# Patient Record
Sex: FEMALE | Race: WHITE | NOT HISPANIC OR LATINO | Employment: OTHER | URBAN - METROPOLITAN AREA
[De-identification: names, ages, dates, MRNs, and addresses within clinical notes are randomized per-mention and may not be internally consistent; named-entity substitution may affect disease eponyms.]

---

## 2017-02-02 ENCOUNTER — OFFICE VISIT (OUTPATIENT)
Dept: INTERNAL MEDICINE | Facility: IMAGING CENTER | Age: 72
End: 2017-02-02
Payer: MEDICARE

## 2017-02-02 VITALS
WEIGHT: 136 LBS | RESPIRATION RATE: 14 BRPM | OXYGEN SATURATION: 100 % | HEIGHT: 65 IN | BODY MASS INDEX: 22.66 KG/M2 | HEART RATE: 79 BPM | DIASTOLIC BLOOD PRESSURE: 60 MMHG | SYSTOLIC BLOOD PRESSURE: 130 MMHG | TEMPERATURE: 98.6 F

## 2017-02-02 DIAGNOSIS — R09.82 PND (POST-NASAL DRIP): ICD-10-CM

## 2017-02-02 DIAGNOSIS — I10 ESSENTIAL HYPERTENSION: ICD-10-CM

## 2017-02-02 DIAGNOSIS — M85.80 OSTEOPENIA: ICD-10-CM

## 2017-02-02 DIAGNOSIS — K58.9 IRRITABLE BOWEL SYNDROME, UNSPECIFIED TYPE: ICD-10-CM

## 2017-02-02 DIAGNOSIS — M50.30 DDD (DEGENERATIVE DISC DISEASE), CERVICAL: ICD-10-CM

## 2017-02-02 DIAGNOSIS — Z00.00 MEDICARE ANNUAL WELLNESS VISIT, SUBSEQUENT: ICD-10-CM

## 2017-02-02 DIAGNOSIS — K27.9 PUD (PEPTIC ULCER DISEASE): ICD-10-CM

## 2017-02-02 DIAGNOSIS — M51.36 DDD (DEGENERATIVE DISC DISEASE), LUMBAR: ICD-10-CM

## 2017-02-02 DIAGNOSIS — I47.10 SVT (SUPRAVENTRICULAR TACHYCARDIA): ICD-10-CM

## 2017-02-02 DIAGNOSIS — L71.9 ROSACEA: ICD-10-CM

## 2017-02-02 DIAGNOSIS — N02.9 BENIGN HEMATURIA: ICD-10-CM

## 2017-02-02 LAB
APPEARANCE UR: CLEAR
BILIRUB UR STRIP-MCNC: NEGATIVE MG/DL
COLOR UR AUTO: YELLOW
GLUCOSE UR STRIP.AUTO-MCNC: NEGATIVE MG/DL
KETONES UR STRIP.AUTO-MCNC: NEGATIVE MG/DL
LEUKOCYTE ESTERASE UR QL STRIP.AUTO: NEGATIVE
NITRITE UR QL STRIP.AUTO: NEGATIVE
PH UR STRIP.AUTO: 6 [PH] (ref 5–8)
PROT UR QL STRIP: NEGATIVE MG/DL
RBC UR QL AUTO: NORMAL
SP GR UR STRIP.AUTO: 1
UROBILINOGEN UR STRIP-MCNC: NEGATIVE MG/DL

## 2017-02-02 PROCEDURE — G0439 PPPS, SUBSEQ VISIT: HCPCS | Performed by: FAMILY MEDICINE

## 2017-02-02 PROCEDURE — 81002 URINALYSIS NONAUTO W/O SCOPE: CPT | Performed by: FAMILY MEDICINE

## 2017-02-02 PROCEDURE — 1036F TOBACCO NON-USER: CPT | Performed by: FAMILY MEDICINE

## 2017-02-02 PROCEDURE — G8432 DEP SCR NOT DOC, RNG: HCPCS | Performed by: FAMILY MEDICINE

## 2017-02-02 RX ORDER — AZELASTINE 1 MG/ML
1 SPRAY, METERED NASAL 2 TIMES DAILY
Qty: 30 ML | Refills: 3 | Status: SHIPPED | OUTPATIENT
Start: 2017-02-02 | End: 2017-12-07

## 2017-02-02 RX ORDER — INDAPAMIDE 1.25 MG
TABLET ORAL
Refills: 6 | COMMUNITY
Start: 2016-11-16

## 2017-02-02 RX ORDER — TRAVOPROST 0.04 MG/ML
SOLUTION/ DROPS OPHTHALMIC
Refills: 3 | COMMUNITY
Start: 2016-12-15

## 2017-02-02 ASSESSMENT — PATIENT HEALTH QUESTIONNAIRE - PHQ9: CLINICAL INTERPRETATION OF PHQ2 SCORE: 0

## 2017-02-02 NOTE — PROGRESS NOTES
Chief Complaint   Patient presents with   • Annual Wellness Visit         HPI:  Heriberto ESQUIVEL is a 71 y.o. established female patient here for Medicare Annual Wellness Visit . She has generally been doing well. This past year she did have a hospital admission for SVT which was treated with a completion. She denies any chest pain, palpitations since then.    She continues to exercise regularly.    Her complaint today is some postnasal drainage. This has been chronic. It is annoying during the day. Does not bother her at night. She is on Flonase. She has tried an antihistamine.      Patient Active Problem List    Diagnosis Date Noted   • SVT (supraventricular tachycardia) (CMS-[facility]) 02/09/2015     Priority: High   • Cervical stenosis of spinal canal 08/08/2016   • DDD (degenerative disc disease), cervical 07/01/2015   • DDD (degenerative disc disease), lumbar 05/04/2015   • Chest pain 02/07/2015   • History of PUD (peptic ulcer disease) 02/07/2015   • Epigastric pain 07/15/2014   • Benign hematuria 09/11/2013   • Hypertension 06/18/2013   • PAC (premature atrial contraction)    • Allergic state    • Rosacea    • IBS (irritable bowel syndrome)    • Osteopenia    • Hiatal hernia        Current Outpatient Prescriptions   Medication Sig Dispense Refill   • TRAVATAN Z 0.004 % Solution INSTILL 1 GTT IN OU HS  3   • azelastine (ASTELIN) 137 MCG/SPRAY nasal spray [place] 1 Spray in nose 2 times a day. 30 mL 3   • AVENOVA 0.01 % Solution APPLY 1ML TO EYELIDS DAILY  3   • metronidazole (METROLOTION) 0.75 % Lotion 1 APPLICATION APPLY ON THE SKIN TWICE A DAY  6   • metoprolol (LOPRESSOR) 25 MG Tab Take 1 Tab by mouth 2 Times a Day. 60 Tab 0   • LUTEIN PO Take 1 Tab by mouth every day.     • Calcium Carbonate-Vitamin D (CALCIUM + D PO) Take 1 Tab by mouth every day.     • multivitamin (THERAGRAN) Tab Take 1 Tab by mouth every day.     • VITAMIN E PO Take 1 Cap by mouth every day.     • Cyanocobalamin (VITAMIN B-12 PO) Take 1 Tab by mouth  every day.     • fluticasone (FLONASE) 50 MCG/ACT nasal spray USE 2 SPRAYS IN EACH NOSTRIL DAILY 16 Bottle 2   • estradiol (ESTRACE) 0.5 MG tablet TAKE 1 TABLET DAILY 90 Tab 3   • losartan (COZAAR) 50 MG Tab TAKE 1 TAB BY MOUTH EVERY DAY. 90 Tab 3   • Omega-3 Fatty Acids (FISH OIL) 1000 MG CAPS capsule Take 1,000 mg by mouth every day.     • FINACEA 15 % Gel 1 APPLICATION APPLY ON THE SKIN TWICE A DAY  6   • omeprazole (PRILOSEC) 20 MG delayed-release capsule TAKE 1 CAPSULE BY MOUTH EVERY DAY. 90 Cap 3   • metoprolol (LOPRESSOR) 25 MG Tab Take 1 Tab by mouth 2 times a day. 180 Tab 1   • PATADAY 0.2 % Solution INSTILL 1 DROP IN BOTH EYES DAILY  4   • travoprost (TRAVATAN Z) 0.004 % Solution Place 1 Drop in both eyes every evening.       No current facility-administered medications for this visit.        The patient reports adherence to this regimen   Current supplements as per medication list.   Chronic narcotic pain medicines: no     Allergies: Asa and Shellfish allergy    Current social contact/activities: Activity family, friends, travel  Exercise: Regularly  Is patient current with immunizations?  yes       She  reports that she has never smoked. She has never used smokeless tobacco. She reports that she drinks alcohol. She reports that she does not use illicit drugs.  Counseling given: Not Answered        DPA/Advanced directive: .has an advanced directive - a copy HAS NOT been provided. Encouraged her to bring a copy in.    ROS:    Gait: Uses no assistive device   Ostomy: no   Other tubes: no   Amputations: no   Chronic oxygen use no   Last eye exam: Within the past year, Dr. Loco  : Denies incontinence.       Screening:    Depression Screening    Little interest or pleasure in doing things?  0 - not at all  Feeling down, depressed , or hopeless? 0 - not at all      If depressive symptoms identified deferred to follow up visit unless specifically addressed in assesment and plan.      Screening for Cognitive  Impairment    Three Minute Recall (banana, sunrise, fence)  3/3    Draw clock face with all 12 numbers set to the hand to show 10 minures past 11 o'clock  1    Cognitive concerns identified defferred for follow up unless specifically addressed in assesment and plan.    Fall Risk Assessment    Has the patient had two or more falls in the last year or any fall with injury in the last year?  No    Safety Assessment    Throw rugs on floor.  Yes  Handrails on all stairs.  Yes  Good lighting in all hallways.  Yes  Difficulty hearing.  No  Patient counseled about all safety risks that were identified.    Functional Assessment ADLs    Are there any barriers preventing you from cooking for yourself or meeting nutritional needs?  No.    Are there any barriers preventing you from driving safely or obtaining transportation?  No.    Are there any barriers preventing you from using a telephone or calling for help?  No.    Are there any barriers preventing you from shopping?  No.    Are there any barriers preventing you from taking care of your own finances?  No.    Are there any barriers preventing you from managing your medications?  No.    Are currently engaing any exercise or physical activity?  Yes.           Patient Care Team:  Alona Pino M.D. as PCP - General (Family Medicine)  Soco Arzola R.N. as Registered Nurse   Cardiology: [facility]  PT: [facility]  Physical Med:   Dr. Knight  GI:  GI  OPT:  Dr. Loco  Derm:  Dr. Crump    Social History   Substance Use Topics   • Smoking status: Never Smoker    • Smokeless tobacco: Never Used   • Alcohol Use: Yes      Comment: rare     Family History   Problem Relation Age of Onset   • Cancer Mother      uterine   • Hypertension Mother    • Cancer Father    • Cancer Maternal Aunt      breast     She  has a past medical history of PAC (premature atrial contraction); Rosacea; Osteopenia; Allergy; IBS (irritable bowel syndrome); Hiatal hernia; Ulcer (CMS-HCC)  "(2004); Glaucoma; SVT (supraventricular tachycardia) (CMS-HCC) (2/9/2015); and DDD (degenerative disc disease), lumbar (5/4/2015).   Past Surgical History   Procedure Laterality Date   • Bunionectomy       x2   • Colonoscopy  2003     recheck 10 years   • Abdominal hysterectomy total  1987     REVIEW OF SYSTEMS:  GENERAL: No fatigue, no weight loss.  HEENT:  Ears--no earache, no change in hearing, no dizziness, no tinnitus.                 Eyes--no blurred vision, no discharge or pain                 Throat--No sore throat, no dysphagia, no hoarseness. Postnasal drainage as above.   CV:  No chest pain,dyspnea,palpitations or edema.  RESP:  No sob,cough,wheezing or hemoptysis.  GI: No dysphagia, heartburn,abdominal pain, nausea, vomiting, diarrhea or constipation.       No melena, jaundice, bleeding, incontinence or change in bowel habits.  :  No dysuria, polyuria, hematuria, incontinence, or nocturia.  MS: Occasional neck and low back pain. She is doing her home exercises regularly. She sees physical therapy once a month   NEURO: No seizures, syncope, paralysis, tremor, or weakness.  SKIN: No new or concerning skin lesions or changes.   PSYCH: Mood fine.        Exam:     Blood pressure 130/60, pulse 79, temperature 37 °C (98.6 °F), resp. rate 14, height 1.651 m (5' 5\"), weight 61.689 kg (136 lb), SpO2 100 %. Body mass index is 22.63 kg/(m^2).    Hearing good.    Dentition good  Alert, oriented in no acute distress.  Eye contact is good, speech goal directed, affect calm  PHYSICAL EXAM;  GENERAL;  WN/WD, No acute distress.   HEENT:  Head normocephalic and atraumatic.    PERRLA, EOMI. No conjunctival injection, no icterus.     TM's normal, nasal mucosa and mouth with no abnormalities.    Oropharynx is clear with no lesions.  NECK: Supple, no adenopathy or thyromegaly.  CV: RR. Normal S1,S2. No murmur, gallop or rub.          No JVD, Carotid pulses 2+ and sym. No bruits.  LUNGS:  Clear, no wheezes, rales or " rhonchi.  BREASTS: Symmetric, no masses or tenderness. No nipple discharge.  AXILLA:  No masses or tenderness.  ABDOMEN: Soft, NT, nondistended. Bowel sounds normal. No hepatosplenomegaly or masses. No rebound or guarding. No hernias.  : Normal external genitalia. Urethra unremarkable. Vaginal vault is normal. Cervix and uterus are absent. No adnexal masses or tenderness.  EXTREMITIES:  No edema.   NEURO:  CN II-XII intact. Motor and sensation grossly intact.  SKIN: No rashes or abnormal lesions.  Psych: Mood and affect are appropriate.          Assessment and Plan. The following treatment and monitoring plan is recommended:    1. Benign hematuria  POCT Urinalysis   2. SVT (supraventricular tachycardia) (CMS-HCC)     3. Rosacea     4. Irritable bowel syndrome, unspecified type     5. Osteopenia     6. Essential hypertension     7. History of PUD (peptic ulcer disease)     8. DDD (degenerative disc disease), lumbar     9. DDD (degenerative disc disease), cervical     10. PND (post-nasal drip)     11. Medicare annual wellness visit, subsequent     12. Healthcare Maintenance. Counseled re: nutrition, activity and safety. Reviewed immunizations.         Services needed: No services needed at this time  Health Care Screening recommendations as per orders if indicated.  Referrals offered: PT/OT/Nutrition counseling/Behavioral Health/Smoking cessation as per orders if indicated.    Discussion today about general wellness and lifestyle habits:    · Prevent falls and reduce trip hazards; Cautioned about securing or removing rugs.  · Have a working fire alarm and carbon monoxide detector;   · Engage in regular physical activity and social activities   ·       Follow-up: 6 months

## 2017-02-02 NOTE — MR AVS SNAPSHOT
"        Heriberto Nichols   2017 2:00 PM   Office Visit   MRN: [id number]    Department:  [facility]   Dept Phone:  [phone]    Description:  Female : 1945   Provider:  Alona Pino M.D.           Allergies as of 2017     Allergen Noted Reactions    Asa [Aspirin] 2010   Vomiting    Shellfish Allergy 2009   Vomiting    diarrhea      You were diagnosed with     Benign hematuria   [[id number]]         Vital Signs     Blood Pressure Pulse Temperature Respirations Height Weight    130/60 mmHg 79 37 °C (98.6 °F) 14 1.651 m (5' 5\") 61.689 kg (136 lb)    Body Mass Index Oxygen Saturation Smoking Status             22.63 kg/m2 100% Never Smoker          Basic Information     Date Of Birth Sex Race Ethnicity Preferred Language    1945 Female White Non- English      Problem List              ICD-10-CM Priority Class Noted - Resolved    PAC (premature atrial contraction) I49.1   Unknown - Present    Allergic state T78.40XA   Unknown - Present    Rosacea L71.9   Unknown - Present    IBS (irritable bowel syndrome) K58.9   Unknown - Present    Osteopenia M85.80   Unknown - Present    Hiatal hernia K44.9   Unknown - Present    Ulcer (CMS-HCC) L98.499   Unknown - Present    Hypertension I10   2013 - Present    Benign hematuria N02.9   2013 - Present    Epigastric pain R10.13   7/15/2014 - Present    Chest pain R07.9   2015 - Present    History of PUD (peptic ulcer disease) K27.9   2015 - Present    SVT (supraventricular tachycardia) (CMS-[facility]) I47.1 High  2015 - Present    DDD (degenerative disc disease), lumbar M51.36   2015 - Present    DDD (degenerative disc disease), cervical M50.30   2015 - Present    Cervical stenosis of spinal canal M48.02   2016 - Present      Health Maintenance        Date Due Completion Dates    PAP SMEAR 2013    IMM PNEUMOCOCCAL 65+ (ADULT) LOW/MEDIUM RISK SERIES (2 of 2 - PPSV23) 2015    MAMMOGRAM " 10/11/2017 10/11/2016, 10/9/2015    BONE DENSITY 10/7/2019 10/7/2014 (Done)    Override on 10/7/2014: Done    COLONOSCOPY 5/21/2023 5/21/2013, 5/21/2013 (Done)    Override on 5/21/2013: Done ([facility])    IMM DTaP/Tdap/Td Vaccine (2 - Td) 6/17/2023 6/17/2013            Results     POCT Urinalysis      Component Value Standard Range & Units    POC Color yellow Negative    POC Appearance clear Negative    POC Leukocyte Esterase negative Negative    POC Nitrites negative Negative    POC Urobiligen negative Negative (0.2) mg/dL    POC Protein negative Negative mg/dL    POC Urine PH 6.0 5.0 - 8.0    POC Blood moderate, non-hemolyzed Negative    POC Specific Gravity 1.005 <1.005 - >1.030    POC Ketones negative Negative mg/dL    POC Biliruben negative Negative mg/dL    POC Glucose negative Negative mg/dL                        Current Immunizations     13-VALENT PCV PREVNAR 8/1/2014    Hepatitis A Vaccine, Ped/Adol 8/9/1999    Influenza TIV (IM) 9/22/2014    Influenza Vaccine Adult HD 10/11/2016, 10/3/2015    Pneumococcal Vaccine (UF)Historical Data 10/1/2008    SHINGLES VACCINE 6/18/2007    Tdap Vaccine 6/17/2013  9:55 AM    Tetanus Vaccine 6/1/2001      Below and/or attached are the medications your provider expects you to take. Review all of your home medications and newly ordered medications with your provider and/or pharmacist. Follow medication instructions as directed by your provider and/or pharmacist. Please keep your medication list with you and share with your provider. Update the information when medications are discontinued, doses are changed, or new medications (including over-the-counter products) are added; and carry medication information at all times in the event of emergency situations     Allergies:  ASA - Vomiting     SHELLFISH ALLERGY - Vomiting               Medications  Valid as of: February 02, 2017 -  3:35 PM    Generic Name Brand Name Tablet Size Instructions for use    Azelaic Acid (Gel) FINACEA 15  % 1 APPLICATION APPLY ON THE SKIN TWICE A DAY        Azelastine HCl (Solution) ASTELIN 137 MCG/SPRAY Spray 1 Spray in nose 2 times a day.        Calcium Citrate-Vitamin D   Take 1 Tab by mouth every day.        Cyanocobalamin   Take 1 Tab by mouth every day.        Estradiol (Tab) ESTRACE 0.5 MG TAKE 1 TABLET DAILY        Eyelid Cleansers (Solution) AVENOVA 0.01 % APPLY 1ML TO EYELIDS DAILY        Fluticasone Propionate (Suspension) FLONASE 50 MCG/ACT USE 2 SPRAYS IN EACH NOSTRIL DAILY        Losartan Potassium (Tab) COZAAR 50 MG TAKE 1 TAB BY MOUTH EVERY DAY.        Lutein   Take 1 Tab by mouth every day.        Metoprolol Tartrate (Tab) LOPRESSOR 25 MG Take 1 Tab by mouth 2 Times a Day.        Metoprolol Tartrate (Tab) LOPRESSOR 25 MG Take 1 Tab by mouth 2 times a day.        MetroNIDAZOLE (Lotion) METROLOTION 0.75 % 1 APPLICATION APPLY ON THE SKIN TWICE A DAY        Multiple Vitamin (Tab) THERAGRAN  Take 1 Tab by mouth every day.        Olopatadine HCl (Solution) PATADAY 0.2 % INSTILL 1 DROP IN BOTH EYES DAILY        Omega-3 Fatty Acids (Cap) fish oil 1000 MG Take 1,000 mg by mouth every day.        Omeprazole (CAPSULE DELAYED RELEASE) PRILOSEC 20 MG TAKE 1 CAPSULE BY MOUTH EVERY DAY.        Travoprost (Solution) TRAVATAN Z 0.004 % Place 1 Drop in both eyes every evening.        Travoprost (Solution) TRAVATAN Z 0.004 % INSTILL 1 GTT IN OU HS        Vitamin E   Take 1 Cap by mouth every day.        .                 Medicines prescribed today were sent to:     [facility] [address] [place] [address] [facility]    [address] [facility] [address]    Phone: [phone] Fax: [phone]    Open 24 Hours?: No      Medication refill instructions:       If your prescription bottle indicates you have medication refills left, it is not necessary to call your provider’s office. Please contact your pharmacy and they will refill your medication.    If your prescription  bottle indicates you do not have any refills left, you may request refills at any time through one of the following ways: The online [employer] system (except Urgent Care), by calling your provider’s office, or by asking your pharmacy to contact your provider’s office with a refill request. Medication refills are processed only during regular business hours and may not be available until the next business day. Your provider may request additional information or to have a follow-up visit with you prior to refilling your medication.   *Please Note: Medication refills are assigned a new Rx number when refilled electronically. Your pharmacy may indicate that no refills were authorized even though a new prescription for the same medication is available at the pharmacy. Please request the medicine by name with the pharmacy before contacting your provider for a refill.        Other Notes About Your Plan     GI:   [facility]  Optometry:  Dr. Loco  Physical Med: Dr. Knight  PT:  [facility] Access Code: Activation code not generated  Current [employer] Status: Active

## 2017-02-09 PROBLEM — R31.1 BENIGN MICROSCOPIC HEMATURIA: Status: ACTIVE | Noted: 2017-02-09

## 2017-02-22 ENCOUNTER — PATIENT MESSAGE (OUTPATIENT)
Dept: INTERNAL MEDICINE | Facility: IMAGING CENTER | Age: 72
End: 2017-02-22

## 2017-02-22 DIAGNOSIS — I10 ESSENTIAL HYPERTENSION: ICD-10-CM

## 2017-02-22 RX ORDER — LOSARTAN POTASSIUM 50 MG/1
50 TABLET ORAL
Qty: 90 TAB | Refills: 3 | Status: SHIPPED | OUTPATIENT
Start: 2017-02-22 | End: 2018-01-14 | Stop reason: SDUPTHER

## 2017-02-22 NOTE — TELEPHONE ENCOUNTER
From: Heriberto Nichols  To: Alona Pino M.D.  Sent: 2/22/2017 10:17 AM PST  Subject: Prescription Question    Good morning Alona. It's time for me to refill Losartan Potassium 50 mg - 90 tablets. I have no refills and it's a hassle to transfer a RX from [facility] to [facility]. Will you send a new prescription to [facility] at [facility] and . [place].   Thanks, Jaun

## 2017-03-17 ENCOUNTER — PATIENT MESSAGE (OUTPATIENT)
Dept: INTERNAL MEDICINE | Facility: IMAGING CENTER | Age: 72
End: 2017-03-17

## 2017-03-17 NOTE — TELEPHONE ENCOUNTER
From: Heriberto Nichols  To: Alona Pino M.D.  Sent: 3/17/2017 10:05 AM PDT  Subject: Prescription Question    Happy [facility] Day, Alona. Time to refill my prescription for metoprolol 25 mg. Please send a new Rx to [place] for me. Thanks. Jaun

## 2017-04-12 ENCOUNTER — PATIENT MESSAGE (OUTPATIENT)
Dept: INTERNAL MEDICINE | Facility: IMAGING CENTER | Age: 72
End: 2017-04-12

## 2017-04-12 DIAGNOSIS — K58.9 IRRITABLE BOWEL SYNDROME, UNSPECIFIED TYPE: ICD-10-CM

## 2017-04-12 RX ORDER — HYOSCYAMINE SULFATE 0.125 MG
125 TABLET ORAL EVERY 6 HOURS PRN
Qty: 30 TAB | Refills: 1 | Status: SHIPPED | OUTPATIENT
Start: 2017-04-12 | End: 2018-12-21 | Stop reason: SDUPTHER

## 2017-04-12 NOTE — TELEPHONE ENCOUNTER
From: Heriberto Nichols  To: Alona Pino M.D.  Sent: 4/12/2017 7:35 AM PDT  Subject: Prescription Question    Good morning, Alona. My Rx for shellfish problems - hyoscyamine - Is out of date according to label noting to discard 9/2016. Is it still good or should I toss and get a new Rx? I don't need many since I seldom use this med.    Jaun

## 2017-04-25 ENCOUNTER — OFFICE VISIT (OUTPATIENT)
Dept: INTERNAL MEDICINE | Facility: IMAGING CENTER | Age: 72
End: 2017-04-25
Payer: MEDICARE

## 2017-04-25 VITALS
SYSTOLIC BLOOD PRESSURE: 126 MMHG | TEMPERATURE: 98.6 F | BODY MASS INDEX: 22.66 KG/M2 | HEART RATE: 80 BPM | HEIGHT: 65 IN | RESPIRATION RATE: 14 BRPM | OXYGEN SATURATION: 97 % | WEIGHT: 136 LBS | DIASTOLIC BLOOD PRESSURE: 65 MMHG

## 2017-04-25 DIAGNOSIS — H92.01 OTALGIA, RIGHT: ICD-10-CM

## 2017-04-25 PROCEDURE — 3014F SCREEN MAMMO DOC REV: CPT | Performed by: FAMILY MEDICINE

## 2017-04-25 PROCEDURE — 1036F TOBACCO NON-USER: CPT | Performed by: FAMILY MEDICINE

## 2017-04-25 PROCEDURE — 1101F PT FALLS ASSESS-DOCD LE1/YR: CPT | Performed by: FAMILY MEDICINE

## 2017-04-25 PROCEDURE — G8420 CALC BMI NORM PARAMETERS: HCPCS | Performed by: FAMILY MEDICINE

## 2017-04-25 PROCEDURE — 4040F PNEUMOC VAC/ADMIN/RCVD: CPT | Performed by: FAMILY MEDICINE

## 2017-04-25 PROCEDURE — 3017F COLORECTAL CA SCREEN DOC REV: CPT | Performed by: FAMILY MEDICINE

## 2017-04-25 PROCEDURE — G8432 DEP SCR NOT DOC, RNG: HCPCS | Performed by: FAMILY MEDICINE

## 2017-04-25 PROCEDURE — 99213 OFFICE O/P EST LOW 20 MIN: CPT | Performed by: FAMILY MEDICINE

## 2017-04-25 NOTE — MR AVS SNAPSHOT
"Heriberto Nichols   2017 4:30 PM   Office Visit   MRN: [id number]    Department:  [facility]   Dept Phone:  [phone]    Description:  Female : 1945   Provider:  Alona Pino M.D.           Reason for Visit     Otalgia           Allergies as of 2017     Allergen Noted Reactions    Asa [Aspirin] 2010   Vomiting    Shellfish Allergy 2009   Vomiting    diarrhea      Vital Signs     Blood Pressure Pulse Temperature Respirations Height Weight    126/65 mmHg 80 37 °C (98.6 °F) 14 1.651 m (5' 5\") 61.689 kg (136 lb)    Body Mass Index Oxygen Saturation Smoking Status             22.63 kg/m2 97% Never Smoker          Basic Information     Date Of Birth Sex Race Ethnicity Preferred Language    1945 Female White Non- English      Problem List              ICD-10-CM Priority Class Noted - Resolved    PAC (premature atrial contraction) I49.1   Unknown - Present    Allergic state T78.40XA   Unknown - Present    Rosacea L71.9   Unknown - Present    IBS (irritable bowel syndrome) K58.9   Unknown - Present    Osteopenia M85.80   Unknown - Present    Hiatal hernia K44.9   Unknown - Present    Hypertension I10   2013 - Present    Epigastric pain R10.13   7/15/2014 - Present    Chest pain R07.9   2015 - Present    History of PUD (peptic ulcer disease) K27.9   2015 - Present    SVT (supraventricular tachycardia) (CMS-[facility]) I47.1 High  2015 - Present    DDD (degenerative disc disease), lumbar M51.36   2015 - Present    DDD (degenerative disc disease), cervical M50.30   2015 - Present    Cervical stenosis of spinal canal M48.02   2016 - Present    Benign microscopic hematuria R31.1   2017 - Present      Health Maintenance        Date Due Completion Dates    IMM PNEUMOCOCCAL 65+ (ADULT) LOW/MEDIUM RISK SERIES (2 of 2 - PPSV23) 2015    MAMMOGRAM 10/11/2017 10/11/2016, 10/9/2015    BONE DENSITY 10/7/2019 10/7/2014 (Done)    Override on " 10/7/2014: Done    COLONOSCOPY 5/21/2023 5/21/2013, 5/21/2013 (Done)    Override on 5/21/2013: Done ([facility])    IMM DTaP/Tdap/Td Vaccine (2 - Td) 6/17/2023 6/17/2013            Current Immunizations     13-VALENT PCV PREVNAR 8/1/2014    Hepatitis A Vaccine, Ped/Adol 8/9/1999    Influenza TIV (IM) 9/22/2014    Influenza Vaccine Adult HD 10/11/2016, 10/3/2015    Pneumococcal Vaccine (UF)Historical Data 10/1/2008    SHINGLES VACCINE 6/18/2007    Tdap Vaccine 6/17/2013  9:55 AM    Tetanus Vaccine 6/1/2001      Below and/or attached are the medications your provider expects you to take. Review all of your home medications and newly ordered medications with your provider and/or pharmacist. Follow medication instructions as directed by your provider and/or pharmacist. Please keep your medication list with you and share with your provider. Update the information when medications are discontinued, doses are changed, or new medications (including over-the-counter products) are added; and carry medication information at all times in the event of emergency situations     Allergies:  ASA - Vomiting     SHELLFISH ALLERGY - Vomiting               Medications  Valid as of: April 25, 2017 -  4:47 PM    Generic Name Brand Name Tablet Size Instructions for use    Azelaic Acid (Gel) FINACEA 15 % 1 APPLICATION APPLY ON THE SKIN TWICE A DAY        Azelastine HCl (Solution) ASTELIN 137 MCG/SPRAY Spray 1 Spray in nose 2 times a day.        Calcium Citrate-Vitamin D   Take 1 Tab by mouth every day.        Cyanocobalamin   Take 1 Tab by mouth every day.        Estradiol (Tab) ESTRACE 0.5 MG TAKE 1 TABLET DAILY        Eyelid Cleansers (Solution) AVENOVA 0.01 % APPLY 1ML TO EYELIDS DAILY        Fluticasone Propionate (Suspension) FLONASE 50 MCG/ACT USE 2 SPRAYS IN EACH NOSTRIL DAILY        Hyoscyamine Sulfate (Tab) ANASPAZ, LEVSIN 0.125 MG Take 1 Tab by mouth every 6 hours as needed for Cramping.        Losartan Potassium (Tab) COZAAR 50 MG Take  1 Tab by mouth every day. TAKE 1 TAB BY MOUTH EVERY DAY.        Lutein   Take 1 Tab by mouth every day.        Metoprolol Tartrate (Tab) LOPRESSOR 25 MG Take 1 Tab by mouth 2 Times a Day.        MetroNIDAZOLE (Lotion) METROLOTION 0.75 % 1 APPLICATION APPLY ON THE SKIN TWICE A DAY        Multiple Vitamin (Tab) THERAGRAN  Take 1 Tab by mouth every day.        Olopatadine HCl (Solution) PATADAY 0.2 % INSTILL 1 DROP IN BOTH EYES DAILY        Omega-3 Fatty Acids (Cap) fish oil 1000 MG Take 1,000 mg by mouth every day.        Omeprazole (CAPSULE DELAYED RELEASE) PRILOSEC 20 MG TAKE 1 CAPSULE BY MOUTH EVERY DAY.        Travoprost (Solution) TRAVATAN Z 0.004 % INSTILL 1 GTT IN OU HS        Vitamin E   Take 1 Cap by mouth every day.        .                 Medicines prescribed today were sent to:     [facility] [address] [place] [address] [facility]    [address] [facility] [address]    Phone: [phone] Fax: [phone]    Open 24 Hours?: No      Medication refill instructions:       If your prescription bottle indicates you have medication refills left, it is not necessary to call your provider’s office. Please contact your pharmacy and they will refill your medication.    If your prescription bottle indicates you do not have any refills left, you may request refills at any time through one of the following ways: The online [employer] system (except Urgent Care), by calling your provider’s office, or by asking your pharmacy to contact your provider’s office with a refill request. Medication refills are processed only during regular business hours and may not be available until the next business day. Your provider may request additional information or to have a follow-up visit with you prior to refilling your medication.   *Please Note: Medication refills are assigned a new Rx number when refilled electronically. Your pharmacy may indicate that no refills were  authorized even though a new prescription for the same medication is available at the pharmacy. Please request the medicine by name with the pharmacy before contacting your provider for a refill.        Other Notes About Your Plan     GI:   [facility]  Optometry:  Dr. Loco  Physical Med: Dr. Knight  PT:  [facility]             MyChart Access Code: Activation code not generated  Current MyChart Status: Active

## 2017-05-03 ENCOUNTER — PATIENT MESSAGE (OUTPATIENT)
Dept: INTERNAL MEDICINE | Facility: IMAGING CENTER | Age: 72
End: 2017-05-03

## 2017-05-03 RX ORDER — FLUTICASONE PROPIONATE 50 MCG
2 SPRAY, SUSPENSION (ML) NASAL DAILY
Qty: 1 BOTTLE | Refills: 6 | Status: SHIPPED | OUTPATIENT
Start: 2017-05-03 | End: 2017-12-07

## 2017-05-03 NOTE — TELEPHONE ENCOUNTER
From: Heriberto Nichols  To: Alona Pino M.D.  Sent: 5/3/2017 10:37 AM PDT  Subject: Prescription Question    Happy Alona saravia. Would you please send a new prescription for fluticasone nasal spray to Thaddeus at [facility]? I appreciate that - especially during this allergy season.  Jaun

## 2017-05-30 DIAGNOSIS — R19.7 DIARRHEA, UNSPECIFIED TYPE: ICD-10-CM

## 2017-05-30 RX ORDER — DIPHENOXYLATE HYDROCHLORIDE AND ATROPINE SULFATE 2.5; .025 MG/1; MG/1
1 TABLET ORAL 4 TIMES DAILY PRN
Qty: 24 TAB | Refills: 0 | Status: SHIPPED
Start: 2017-05-30 | End: 2017-12-07

## 2017-05-31 ENCOUNTER — HOSPITAL ENCOUNTER (OUTPATIENT)
Facility: MEDICAL CENTER | Age: 72
End: 2017-05-31
Attending: FAMILY MEDICINE
Payer: MEDICARE

## 2017-05-31 DIAGNOSIS — R19.7 DIARRHEA, UNSPECIFIED TYPE: ICD-10-CM

## 2017-05-31 PROCEDURE — 87899 AGENT NOS ASSAY W/OPTIC: CPT

## 2017-05-31 PROCEDURE — 87046 STOOL CULTR AEROBIC BACT EA: CPT

## 2017-05-31 PROCEDURE — 87045 FECES CULTURE AEROBIC BACT: CPT

## 2017-06-01 LAB
E COLI SXT1+2 STL IA: NORMAL
SIGNIFICANT IND 70042: NORMAL
SITE SITE: NORMAL
SOURCE SOURCE: NORMAL

## 2017-06-03 LAB
BACTERIA STL CULT: NORMAL
E COLI SXT1+2 STL IA: NORMAL
SIGNIFICANT IND 70042: NORMAL
SITE SITE: NORMAL
SOURCE SOURCE: NORMAL

## 2017-07-07 DIAGNOSIS — Z78.0 MENOPAUSE: ICD-10-CM

## 2017-07-07 RX ORDER — ESTRADIOL 0.5 MG/1
TABLET ORAL
Qty: 90 TAB | Refills: 3 | Status: SHIPPED | OUTPATIENT
Start: 2017-07-07 | End: 2018-06-27 | Stop reason: SDUPTHER

## 2017-11-07 ENCOUNTER — OFFICE VISIT (OUTPATIENT)
Dept: CARDIOLOGY | Facility: MEDICAL CENTER | Age: 72
End: 2017-11-07
Payer: MEDICARE

## 2017-11-07 VITALS
BODY MASS INDEX: 23.32 KG/M2 | HEART RATE: 61 BPM | OXYGEN SATURATION: 97 % | DIASTOLIC BLOOD PRESSURE: 76 MMHG | WEIGHT: 140 LBS | HEIGHT: 65 IN | SYSTOLIC BLOOD PRESSURE: 118 MMHG

## 2017-11-07 DIAGNOSIS — I47.10 SVT (SUPRAVENTRICULAR TACHYCARDIA): ICD-10-CM

## 2017-11-07 LAB — EKG IMPRESSION: NORMAL

## 2017-11-07 PROCEDURE — 93000 ELECTROCARDIOGRAM COMPLETE: CPT | Performed by: INTERNAL MEDICINE

## 2017-11-07 PROCEDURE — 99213 OFFICE O/P EST LOW 20 MIN: CPT | Performed by: INTERNAL MEDICINE

## 2017-11-07 RX ORDER — IVERMECTIN 10 MG/G
CREAM TOPICAL
Refills: 4 | COMMUNITY
Start: 2017-11-02 | End: 2021-05-18

## 2017-11-07 ASSESSMENT — ENCOUNTER SYMPTOMS: PALPITATIONS: 0

## 2017-11-07 NOTE — PROGRESS NOTES
Subjective:   Heriberto Nichols is a 72 y.o. female who presents today for follow up of svt s/p ablation    Has been good without recurrence of svt.   She has not had racing even with jogging or activity.    She is active.  She walks a lot.  She would guess that she does that 5-6 days a week.  She actively gardens.  Stretches and weight work.        Past Medical History:   Diagnosis Date   • Allergy    • Benign microscopic hematuria 2/9/2017   • DDD (degenerative disc disease), lumbar 5/4/2015   • Glaucoma    • Hiatal hernia    • IBS (irritable bowel syndrome)    • Osteopenia    • PAC (premature atrial contraction)    • Rosacea    • SVT (supraventricular tachycardia) (CMS-HCC) 2/9/2015   • Ulcer (CMS-HCC) 2004     Past Surgical History:   Procedure Laterality Date   • COLONOSCOPY  2003    recheck 10 years   • ABDOMINAL HYSTERECTOMY TOTAL  1987   • BUNIONECTOMY      x2     Family History   Problem Relation Age of Onset   • Cancer Mother      uterine   • Hypertension Mother    • Cancer Father    • Cancer Maternal Aunt      breast     History   Smoking Status   • Never Smoker   Smokeless Tobacco   • Never Used     Allergies   Allergen Reactions   • Asa [Aspirin] Vomiting   • Shellfish Allergy Vomiting     diarrhea     Outpatient Encounter Prescriptions as of 11/7/2017   Medication Sig Dispense Refill   • estradiol (ESTRACE) 0.5 MG tablet TAKE 1 TABLET DAILY 90 Tab 3   • diphenoxylate-atropine (LOMOTIL) 2.5-0.025 MG Tab Take 1 Tab by mouth 4 times a day as needed for Diarrhea. 24 Tab 0   • hyoscyamine (LEVSIN) 0.125 MG tablet Take 1 Tab by mouth every 6 hours as needed for Cramping. 30 Tab 1   • losartan (COZAAR) 50 MG Tab Take 1 Tab by mouth every day. TAKE 1 TAB BY MOUTH EVERY DAY. 90 Tab 3   • FINACEA 15 % Gel 1 APPLICATION APPLY ON THE SKIN TWICE A DAY  6   • TRAVATAN Z 0.004 % Solution INSTILL 1 GTT IN OU HS  3   • azelastine (ASTELIN) 137 MCG/SPRAY nasal spray [place] 1 Spray in nose 2 times a day. 30 mL 3   •  "omeprazole (PRILOSEC) 20 MG delayed-release capsule TAKE 1 CAPSULE BY MOUTH EVERY DAY. (Patient taking differently: TAKE 1 CAPSULE BY MOUTH EVERY DAY. prn) 90 Cap 3   • AVENOVA 0.01 % Solution APPLY 1ML TO EYELIDS DAILY  3   • metronidazole (METROLOTION) 0.75 % Lotion 1 APPLICATION APPLY ON THE SKIN TWICE A DAY  6   • PATADAY 0.2 % Solution INSTILL 1 DROP IN BOTH EYES DAILY  4   • metoprolol (LOPRESSOR) 25 MG Tab Take 1 Tab by mouth 2 Times a Day. 60 Tab 0   • LUTEIN PO Take 1 Tab by mouth every day.     • Calcium Carbonate-Vitamin D (CALCIUM + D PO) Take 1 Tab by mouth every day.     • multivitamin (THERAGRAN) Tab Take 1 Tab by mouth every day.     • VITAMIN E PO Take 1 Cap by mouth every day.     • Cyanocobalamin (VITAMIN B-12 PO) Take 1 Tab by mouth every day.     • fluticasone (FLONASE) 50 MCG/ACT nasal spray USE 2 SPRAYS IN EACH NOSTRIL DAILY 16 Bottle 2   • Omega-3 Fatty Acids (FISH OIL) 1000 MG CAPS capsule Take 1,000 mg by mouth every day.     • SOOLANTRA 1 % Cream APPLY TO THE AFFECTED AREA ONCE D  4   • fluticasone (FLONASE) 50 MCG/ACT nasal spray Spray 2 Sprays in nose every day. 1 Bottle 6     No facility-administered encounter medications on file as of 11/7/2017.      Review of Systems   Cardiovascular: Negative for palpitations.        Objective:   /76   Pulse 61   Ht 1.651 m (5' 5\")   Wt 63.5 kg (140 lb)   SpO2 97%   BMI 23.30 kg/m²     Physical Exam   Constitutional: She is oriented to person, place, and time. She appears well-developed and well-nourished. No distress.   HENT:   Head: Normocephalic and atraumatic.   Right Ear: External ear normal.   Left Ear: External ear normal.   Mouth/Throat: Oropharynx is clear and moist.   Eyes: Conjunctivae and EOM are normal. Right eye exhibits no discharge. Left eye exhibits no discharge. No scleral icterus.   Neck: Normal range of motion. Neck supple. No JVD present. No tracheal deviation present. No thyromegaly present.   Cardiovascular: Normal " rate, regular rhythm, normal heart sounds and intact distal pulses.  Exam reveals no gallop and no friction rub.    No murmur heard.  Pulmonary/Chest: Effort normal and breath sounds normal. No stridor. No respiratory distress. She has no wheezes. She has no rales.   Abdominal: Soft. She exhibits no distension.   Musculoskeletal: She exhibits no edema or tenderness.   Neurological: She is alert and oriented to person, place, and time. No cranial nerve deficit. Coordination normal.   Skin: Skin is warm and dry. No rash noted. She is not diaphoretic. No erythema. No pallor.   Psychiatric: She has a normal mood and affect. Her behavior is normal. Judgment and thought content normal.   Vitals reviewed.      Assessment:     1. SVT (supraventricular tachycardia) (CMS-[facility])  EKG       Medical Decision Making:  Today's Assessment / Status / Plan:   Svt no recurrence.  htn well controlled.   Will do one more year follow up at her preference and then may change to as needed.

## 2017-12-07 ENCOUNTER — OFFICE VISIT (OUTPATIENT)
Dept: INTERNAL MEDICINE | Facility: IMAGING CENTER | Age: 72
End: 2017-12-07
Payer: MEDICARE

## 2017-12-07 ENCOUNTER — HOSPITAL ENCOUNTER (OUTPATIENT)
Facility: MEDICAL CENTER | Age: 72
End: 2017-12-07
Attending: FAMILY MEDICINE
Payer: MEDICARE

## 2017-12-07 VITALS
WEIGHT: 142 LBS | BODY MASS INDEX: 23.66 KG/M2 | HEIGHT: 65 IN | TEMPERATURE: 97.7 F | HEART RATE: 65 BPM | RESPIRATION RATE: 14 BRPM | DIASTOLIC BLOOD PRESSURE: 72 MMHG | OXYGEN SATURATION: 98 % | SYSTOLIC BLOOD PRESSURE: 124 MMHG

## 2017-12-07 DIAGNOSIS — E55.9 VITAMIN D DEFICIENCY: ICD-10-CM

## 2017-12-07 DIAGNOSIS — R53.83 FATIGUE, UNSPECIFIED TYPE: ICD-10-CM

## 2017-12-07 DIAGNOSIS — I47.10 SVT (SUPRAVENTRICULAR TACHYCARDIA): ICD-10-CM

## 2017-12-07 LAB
25(OH)D3 SERPL-MCNC: 36 NG/ML (ref 30–100)
ALBUMIN SERPL BCP-MCNC: 4.4 G/DL (ref 3.2–4.9)
ALBUMIN/GLOB SERPL: 1.6 G/DL
ALP SERPL-CCNC: 45 U/L (ref 30–99)
ALT SERPL-CCNC: 23 U/L (ref 2–50)
ANION GAP SERPL CALC-SCNC: 7 MMOL/L (ref 0–11.9)
AST SERPL-CCNC: 25 U/L (ref 12–45)
BASOPHILS # BLD AUTO: 1.2 % (ref 0–1.8)
BASOPHILS # BLD: 0.06 K/UL (ref 0–0.12)
BILIRUB SERPL-MCNC: 1.7 MG/DL (ref 0.1–1.5)
BUN SERPL-MCNC: 19 MG/DL (ref 8–22)
CALCIUM SERPL-MCNC: 10 MG/DL (ref 8.5–10.5)
CHLORIDE SERPL-SCNC: 105 MMOL/L (ref 96–112)
CO2 SERPL-SCNC: 25 MMOL/L (ref 20–33)
CREAT SERPL-MCNC: 0.94 MG/DL (ref 0.5–1.4)
EOSINOPHIL # BLD AUTO: 0.13 K/UL (ref 0–0.51)
EOSINOPHIL NFR BLD: 2.6 % (ref 0–6.9)
ERYTHROCYTE [DISTWIDTH] IN BLOOD BY AUTOMATED COUNT: 42.7 FL (ref 35.9–50)
GFR SERPL CREATININE-BSD FRML MDRD: 58 ML/MIN/1.73 M 2
GLOBULIN SER CALC-MCNC: 2.7 G/DL (ref 1.9–3.5)
GLUCOSE SERPL-MCNC: 84 MG/DL (ref 65–99)
HCT VFR BLD AUTO: 41.3 % (ref 37–47)
HGB BLD-MCNC: 15.4 G/DL (ref 12–16)
IMM GRANULOCYTES # BLD AUTO: 0.01 K/UL (ref 0–0.11)
IMM GRANULOCYTES NFR BLD AUTO: 0.2 % (ref 0–0.9)
LYMPHOCYTES # BLD AUTO: 1.69 K/UL (ref 1–4.8)
LYMPHOCYTES NFR BLD: 33.3 % (ref 22–41)
MCH RBC QN AUTO: 36.3 PG (ref 27–33)
MCHC RBC AUTO-ENTMCNC: 37.3 G/DL (ref 33.6–35)
MCV RBC AUTO: 97.4 FL (ref 81.4–97.8)
MONOCYTES # BLD AUTO: 0.4 K/UL (ref 0–0.85)
MONOCYTES NFR BLD AUTO: 7.9 % (ref 0–13.4)
NEUTROPHILS # BLD AUTO: 2.78 K/UL (ref 2–7.15)
NEUTROPHILS NFR BLD: 54.8 % (ref 44–72)
NRBC # BLD AUTO: 0 K/UL
NRBC BLD AUTO-RTO: 0 /100 WBC
PLATELET # BLD AUTO: 257 K/UL (ref 164–446)
PMV BLD AUTO: 11.2 FL (ref 9–12.9)
POTASSIUM SERPL-SCNC: 3.7 MMOL/L (ref 3.6–5.5)
PROT SERPL-MCNC: 7.1 G/DL (ref 6–8.2)
RBC # BLD AUTO: 4.24 M/UL (ref 4.2–5.4)
SODIUM SERPL-SCNC: 137 MMOL/L (ref 135–145)
T4 FREE SERPL-MCNC: 0.7 NG/DL (ref 0.53–1.43)
TSH SERPL DL<=0.005 MIU/L-ACNC: 1.89 UIU/ML (ref 0.3–3.7)
VIT B12 SERPL-MCNC: >1500 PG/ML (ref 211–911)
WBC # BLD AUTO: 5.1 K/UL (ref 4.8–10.8)

## 2017-12-07 PROCEDURE — 80053 COMPREHEN METABOLIC PANEL: CPT

## 2017-12-07 PROCEDURE — 84439 ASSAY OF FREE THYROXINE: CPT

## 2017-12-07 PROCEDURE — 82306 VITAMIN D 25 HYDROXY: CPT

## 2017-12-07 PROCEDURE — 82607 VITAMIN B-12: CPT

## 2017-12-07 PROCEDURE — 85025 COMPLETE CBC W/AUTO DIFF WBC: CPT

## 2017-12-07 PROCEDURE — 84443 ASSAY THYROID STIM HORMONE: CPT

## 2017-12-07 PROCEDURE — 99213 OFFICE O/P EST LOW 20 MIN: CPT | Performed by: FAMILY MEDICINE

## 2017-12-11 NOTE — PROGRESS NOTES
Chief Complaint   Patient presents with   • Fatigue       HISTORY OF PRESENT ILLNESS: Patient is a 72 y.o. female established patient who presents today Complaining of intermittent fatigue over the past 3 months. She states she has some days where she just does not feel well rested. She believes she is sleeping well. She does feel better if she takes a nap. She continues to exercise. She denies depression although she does worry about her mom.  She has a history of SVT, recently saw Dr. Montiel's. Status post ablation. She feels no palpitations, chest pain, no dyspnea or edema.  No change in medications.  Denies any change in bowels.      Patient Active Problem List    Diagnosis Date Noted   • SVT (supraventricular tachycardia) (CMS-[facility]) 02/09/2015     Priority: High   • Benign microscopic hematuria 02/09/2017   • Cervical stenosis of spinal canal 08/08/2016   • DDD (degenerative disc disease), cervical 07/01/2015   • DDD (degenerative disc disease), lumbar 05/04/2015   • History of PUD (peptic ulcer disease) 02/07/2015   • Hypertension 06/18/2013   • Allergic state    • Rosacea    • IBS (irritable bowel syndrome)    • Osteopenia    • Hiatal hernia      Current Outpatient Prescriptions on File Prior to Visit   Medication Sig Dispense Refill   • estradiol (ESTRACE) 0.5 MG tablet TAKE 1 TABLET DAILY 90 Tab 3   • losartan (COZAAR) 50 MG Tab Take 1 Tab by mouth every day. TAKE 1 TAB BY MOUTH EVERY DAY. 90 Tab 3   • metoprolol (LOPRESSOR) 25 MG Tab Take 1 Tab by mouth 2 Times a Day. 60 Tab 0   • Calcium Carbonate-Vitamin D (CALCIUM + D PO) Take 1 Tab by mouth every day.     • Cyanocobalamin (VITAMIN B-12 PO) Take 1 Tab by mouth every day.     • fluticasone (FLONASE) 50 MCG/ACT nasal spray USE 2 SPRAYS IN EACH NOSTRIL DAILY 16 Bottle 2   • SOOLANTRA 1 % Cream APPLY TO THE AFFECTED AREA ONCE D  4   • hyoscyamine (LEVSIN) 0.125 MG tablet Take 1 Tab by mouth every 6 hours as needed for Cramping. 30 Tab 1   • FINACEA 15 % Gel 1  "APPLICATION APPLY ON THE SKIN TWICE A DAY  6   • TRAVATAN Z 0.004 % Solution INSTILL 1 GTT IN OU HS  3   • omeprazole (PRILOSEC) 20 MG delayed-release capsule TAKE 1 CAPSULE BY MOUTH EVERY DAY. (Patient taking differently: TAKE 1 CAPSULE BY MOUTH EVERY DAY. prn) 90 Cap 3   • AVENOVA 0.01 % Solution APPLY 1ML TO EYELIDS DAILY  3   • metronidazole (METROLOTION) 0.75 % Lotion 1 APPLICATION APPLY ON THE SKIN TWICE A DAY  6   • PATADAY 0.2 % Solution INSTILL 1 DROP IN BOTH EYES DAILY  4   • LUTEIN PO Take 1 Tab by mouth every day.     • multivitamin (THERAGRAN) Tab Take 1 Tab by mouth every day.     • VITAMIN E PO Take 1 Cap by mouth every day.     • Omega-3 Fatty Acids (FISH OIL) 1000 MG CAPS capsule Take 1,000 mg by mouth every day.       No current facility-administered medications on file prior to visit.          Past medical, surgical, family, and social history is reviewed and updated in Epic chart by me today.   Medications and allergies reviewed and updated in Epic chart by me today.     REVIEW OF SYSTEMS:  GENERAL: intermittent fatigue, no weight loss.  HEENT:  Ears--no earache, no change in hearing, no dizziness, no tinnitus.                 Eyes--no blurred vision, no discharge or pain                 Throat--No sore throat, no dysphagia, no hoarseness  CV:  No chest pain,dyspnea,palpitations or edema.  RESP:  No sob,cough,wheezing or hemoptysis.  GI: No dysphagia, heartburn,abdominal pain, nausea, vomiting, diarrhea or constipation.       No melena, jaundice, bleeding, incontinence or change in bowel habits.  :  No dysuria, polyuria, hematuria, incontinence, or nocturia.  MS:  Intermittent neck and low back pain  NEURO:  No seizures, syncope, paralysis, tremor, or weakness.  SKIN: No new or concerning skin lesions or changes.   PSYCH: Mood fine.    Vitals:    12/07/17 0900   BP: 124/72   Pulse: 65   Resp: 14   Temp: 36.5 °C (97.7 °F)   SpO2: 98%   Weight: 64.4 kg (142 lb)   Height: 1.651 m (5' 5\") "     Physical Exam:  Gen: Well developed, well nourished. No acute distress.  Neck:  Supple, no adenopathy or thyromegaly.  Heart:  Regular rate and rhythm.  Normal S1, S2. No murmur, gallop or rub.  Lungs:  Clear, No wheezes,rales or rhonchi.  Abdomen: Soft, nontender, nondistended. Normal bowel sounds. No hepatosplenomegaly or masses, or hernias. No rebound or guarding.  Lymph:  No adenopathy  Extremities:  No edema.  Psych: Mood and affect are appropriate.        Assessment/Plan:  1. Fatigue, unspecified type . Uncertain etiology. Advised to monitor lab as ordered. Follow-up pending those results.  CBC WITH DIFFERENTIAL    COMP METABOLIC PANEL    TSH    FREE THYROXINE    VITAMIN B12   2. Vitamin D deficiency  VITAMIN D,25 HYDROXY   3. SVT (supraventricular tachycardia) (CMS-HCC)

## 2018-01-14 DIAGNOSIS — I10 ESSENTIAL HYPERTENSION: ICD-10-CM

## 2018-01-15 RX ORDER — LOSARTAN POTASSIUM 50 MG/1
TABLET ORAL
Qty: 90 TAB | Refills: 1 | Status: SHIPPED | OUTPATIENT
Start: 2018-01-15 | End: 2018-07-09 | Stop reason: SDUPTHER

## 2018-03-12 ENCOUNTER — OFFICE VISIT (OUTPATIENT)
Dept: INTERNAL MEDICINE | Facility: IMAGING CENTER | Age: 73
End: 2018-03-12
Payer: MEDICARE

## 2018-03-12 VITALS
SYSTOLIC BLOOD PRESSURE: 120 MMHG | TEMPERATURE: 98 F | RESPIRATION RATE: 14 BRPM | DIASTOLIC BLOOD PRESSURE: 70 MMHG | BODY MASS INDEX: 23.66 KG/M2 | HEART RATE: 64 BPM | OXYGEN SATURATION: 97 % | WEIGHT: 142 LBS | HEIGHT: 65 IN

## 2018-03-12 DIAGNOSIS — R60.0 LOCAL EDEMA: ICD-10-CM

## 2018-03-12 DIAGNOSIS — S60.222A CONTUSION OF LEFT HAND, INITIAL ENCOUNTER: ICD-10-CM

## 2018-03-12 PROCEDURE — 99213 OFFICE O/P EST LOW 20 MIN: CPT | Performed by: INTERNAL MEDICINE

## 2018-03-12 ASSESSMENT — PATIENT HEALTH QUESTIONNAIRE - PHQ9: CLINICAL INTERPRETATION OF PHQ2 SCORE: 0

## 2018-03-12 NOTE — PROGRESS NOTES
Chief Complaint   Patient presents with   • Hand Injury   • Edema       HISTORY OF THE PRESENT ILLNESS: Patient is a 72 y.o. female. Patient comes in for evaluation of left hand swelling and bruising and bilateral lower extremity edema. Symptoms occurred in [place] last week. She does not remember any specific trauma to the hand or other. She reports that it was initially swollen but this resolved within a few days with subsequent discoloration on the dorsal hand.    She is also noticed that she has edema in the legs. Symptoms were worse in [place]. No orthopnea. No PND. Diet is high in salt. She also had decreased water intake while there because of attending basketball games. No history of heart failure. Edema has improved since returning to [place]. She also noticed symptoms improved after walking.       Allergies: Asa [aspirin] and Shellfish allergy    Current Outpatient Prescriptions Ordered in [facility]   Medication Sig Dispense Refill   • losartan (COZAAR) 50 MG Tab TAKE 1 TABLET BY MOUTH EVERY DAY 90 Tab 1   • metoprolol (LOPRESSOR) 25 MG Tab TAKE 1 TABLET BY MOUTH TWICE DAILY 180 Tab 3   • SOOLANTRA 1 % Cream APPLY TO THE AFFECTED AREA ONCE D  4   • estradiol (ESTRACE) 0.5 MG tablet TAKE 1 TABLET DAILY 90 Tab 3   • hyoscyamine (LEVSIN) 0.125 MG tablet Take 1 Tab by mouth every 6 hours as needed for Cramping. 30 Tab 1   • FINACEA 15 % Gel 1 APPLICATION APPLY ON THE SKIN TWICE A DAY  6   • TRAVATAN Z 0.004 % Solution INSTILL 1 GTT IN OU HS  3   • omeprazole (PRILOSEC) 20 MG delayed-release capsule TAKE 1 CAPSULE BY MOUTH EVERY DAY. (Patient taking differently: TAKE 1 CAPSULE BY MOUTH EVERY DAY. prn) 90 Cap 3   • AVENOVA 0.01 % Solution APPLY 1ML TO EYELIDS DAILY  3   • metronidazole (METROLOTION) 0.75 % Lotion 1 APPLICATION APPLY ON THE SKIN TWICE A DAY  6   • PATADAY 0.2 % Solution INSTILL 1 DROP IN BOTH EYES DAILY  4   • metoprolol (LOPRESSOR) 25 MG Tab Take 1 Tab by mouth 2 Times a Day. 60 Tab 0   • LUTEIN PO  "Take 1 Tab by mouth every day.     • Calcium Carbonate-Vitamin D (CALCIUM + D PO) Take 1 Tab by mouth every day.     • multivitamin (THERAGRAN) Tab Take 1 Tab by mouth every day.     • VITAMIN E PO Take 1 Cap by mouth every day.     • Cyanocobalamin (VITAMIN B-12 PO) Take 1 Tab by mouth every day.     • fluticasone (FLONASE) 50 MCG/ACT nasal spray USE 2 SPRAYS IN EACH NOSTRIL DAILY 16 Bottle 2   • Omega-3 Fatty Acids (FISH OIL) 1000 MG CAPS capsule Take 1,000 mg by mouth every day.       No current [facility]-ordered facility-administered medications on file.        Past medical history, social history and family history were reviewed from chart today    Review of systems: Per HPI. No headache, fever, chills, chest pain, dyspnea or dyspepsia. All others negative.     Exam: Blood pressure 120/70, pulse 64, temperature 36.7 °C (98 °F), resp. rate 14, height 1.651 m (5' 5\"), weight 64.4 kg (142 lb), SpO2 97 %.  General: Well-nourished, well-developed. No change in appearance. No distress.  HEENT: Normocephalic.  Pulmonary: Clear.. Normal effort.  Cardiovascular: Regular   Abdomen: Normal appearing. Soft, nontender, nondistended.   Neurologic: Cranial nerves II through XII are grossly intact, alert and oriented x3  Extremity: Dorsum of left hand is bruised and warm compared to right. She also has trace pretibial edema on the lower one third of the lower extremity bilaterally. The bones of the left hand are nontender to palpate.      Assessment/Plan  1. Contusion of left hand, initial encounter     2. Local edema         The discoloration on the left hand seems most consistent with bruising. Suspect this is from trauma. Recommended warm compresses and elevation as needed. It is nontender with palpation.    I suspect her localized edema in the extremities is due to fluid retention. Suspect this is related to high salt intake, travel and sedentary sports watching. Recommended increased water, leg elevation and activity. Symptoms " persist she could take diuretic but at this time she would like to avoid which I agree with.

## 2018-06-01 ENCOUNTER — OFFICE VISIT (OUTPATIENT)
Dept: INTERNAL MEDICINE | Facility: IMAGING CENTER | Age: 73
End: 2018-06-01
Payer: MEDICARE

## 2018-06-01 VITALS
HEART RATE: 64 BPM | WEIGHT: 140 LBS | HEIGHT: 65 IN | TEMPERATURE: 97.6 F | DIASTOLIC BLOOD PRESSURE: 74 MMHG | RESPIRATION RATE: 14 BRPM | OXYGEN SATURATION: 99 % | SYSTOLIC BLOOD PRESSURE: 120 MMHG | BODY MASS INDEX: 23.32 KG/M2

## 2018-06-01 DIAGNOSIS — I47.10 SVT (SUPRAVENTRICULAR TACHYCARDIA): ICD-10-CM

## 2018-06-01 DIAGNOSIS — Z00.00 MEDICARE ANNUAL WELLNESS VISIT, SUBSEQUENT: ICD-10-CM

## 2018-06-01 DIAGNOSIS — M50.30 DDD (DEGENERATIVE DISC DISEASE), CERVICAL: ICD-10-CM

## 2018-06-01 DIAGNOSIS — K58.9 IRRITABLE BOWEL SYNDROME, UNSPECIFIED TYPE: ICD-10-CM

## 2018-06-01 DIAGNOSIS — I10 ESSENTIAL HYPERTENSION: ICD-10-CM

## 2018-06-01 DIAGNOSIS — M51.36 DDD (DEGENERATIVE DISC DISEASE), LUMBAR: ICD-10-CM

## 2018-06-01 DIAGNOSIS — J30.2 SEASONAL ALLERGIC RHINITIS, UNSPECIFIED TRIGGER: ICD-10-CM

## 2018-06-01 DIAGNOSIS — L71.9 ROSACEA: ICD-10-CM

## 2018-06-01 DIAGNOSIS — R31.1 BENIGN MICROSCOPIC HEMATURIA: ICD-10-CM

## 2018-06-01 PROCEDURE — G0439 PPPS, SUBSEQ VISIT: HCPCS | Performed by: FAMILY MEDICINE

## 2018-06-01 RX ORDER — AZELASTINE 1 MG/ML
1 SPRAY, METERED NASAL 2 TIMES DAILY
Qty: 30 ML | Refills: 6 | Status: SHIPPED | OUTPATIENT
Start: 2018-06-01 | End: 2019-04-16

## 2018-06-01 ASSESSMENT — ACTIVITIES OF DAILY LIVING (ADL): BATHING_REQUIRES_ASSISTANCE: 0

## 2018-06-01 ASSESSMENT — PATIENT HEALTH QUESTIONNAIRE - PHQ9: CLINICAL INTERPRETATION OF PHQ2 SCORE: 0

## 2018-06-01 ASSESSMENT — ENCOUNTER SYMPTOMS: GENERAL WELL-BEING: EXCELLENT

## 2018-06-01 NOTE — PROGRESS NOTES
Chief Complaint   Patient presents with   • Annual Wellness Visit         HPI:  Heriberto ESQUIVEL is a 73 y.o. established female patient here for Medicare Annual Wellness Visit  She is generally doing well. No major complaints.           Patient Active Problem List    Diagnosis Date Noted   • SVT (supraventricular tachycardia) (HCC) 02/09/2015     Priority: High   • Benign microscopic hematuria 02/09/2017   • Cervical stenosis of spinal canal 08/08/2016   • DDD (degenerative disc disease), cervical 07/01/2015   • DDD (degenerative disc disease), lumbar 05/04/2015   • History of PUD (peptic ulcer disease) 02/07/2015   • Hypertension 06/18/2013   • Allergic state    • Rosacea    • IBS (irritable bowel syndrome)    • Osteopenia    • Hiatal hernia        Current Outpatient Prescriptions   Medication Sig Dispense Refill   • azelastine (ASTELIN) 137 MCG/SPRAY nasal spray [place] 1 Spray in nose 2 times a day. 30 mL 6   • losartan (COZAAR) 50 MG Tab TAKE 1 TABLET BY MOUTH EVERY DAY 90 Tab 1   • metoprolol (LOPRESSOR) 25 MG Tab TAKE 1 TABLET BY MOUTH TWICE DAILY 180 Tab 3   • SOOLANTRA 1 % Cream APPLY TO THE AFFECTED AREA ONCE D  4   • estradiol (ESTRACE) 0.5 MG tablet TAKE 1 TABLET DAILY 90 Tab 3   • FINACEA 15 % Gel 1 APPLICATION APPLY ON THE SKIN TWICE A DAY  6   • TRAVATAN Z 0.004 % Solution INSTILL 1 GTT IN OU HS  3   • AVENOVA 0.01 % Solution APPLY 1ML TO EYELIDS DAILY  3   • metronidazole (METROLOTION) 0.75 % Lotion 1 APPLICATION APPLY ON THE SKIN TWICE A DAY  6   • PATADAY 0.2 % Solution INSTILL 1 DROP IN BOTH EYES DAILY  4   • LUTEIN PO Take 1 Tab by mouth every day.     • Calcium Carbonate-Vitamin D (CALCIUM + D PO) Take 1 Tab by mouth every day.     • fluticasone (FLONASE) 50 MCG/ACT nasal spray USE 2 SPRAYS IN EACH NOSTRIL DAILY 16 Bottle 2   • Omega-3 Fatty Acids (FISH OIL) 1000 MG CAPS capsule Take 1,000 mg by mouth every day.     • hyoscyamine (LEVSIN) 0.125 MG tablet Take 1 Tab by mouth every 6 hours as needed for  Cramping. 30 Tab 1   • omeprazole (PRILOSEC) 20 MG delayed-release capsule TAKE 1 CAPSULE BY MOUTH EVERY DAY. (Patient taking differently: TAKE 1 CAPSULE BY MOUTH EVERY DAY. prn) 90 Cap 3     No current facility-administered medications for this visit.         The patient reports adherence to this regimen   Current supplements as per medication list.   Chronic narcotic pain medicines: no     Allergies: Asa [aspirin] and Shellfish allergy    Current social contact/activities: Activity family, friends, travel  Exercise: Yesyes  Is patient current with immunizations?  yes       She  reports that she has never smoked. She has never used smokeless tobacco. She reports that she drinks alcohol. She reports that she does not use drugs.        DPA/Advanced directive: .has an advanced directive - a copy HAS NOT been provided. Encouraged her to bring in a copy.    ROS:    Gait: Uses no assistive device   Ostomy: no   Other tubes: no   Amputations: no   Chronic oxygen use no   Last eye exam: Within the past 6 months, Dr. Loco  : Denies incontinence.       Screening:    Depression Screening    Little interest or pleasure in doing things?  0 - not at all  Feeling down, depressed , or hopeless? 0 - not at all  Patient Health Questionnaire Score: 0     If depressive symptoms identified deferred to follow up visit unless specifically addressed in assessment and plan.        Screening for Cognitive Impairment    Three Minute Recall (leader, season, table) 3/3    Den clock face with all 12 numbers and set the hands to show 10 past 11.  Yes    Cognitive concerns identified deferred for follow up unless specifically addressed in assessment and plan.    Fall Risk Assessment    Has the patient had two or more falls in the last year or any fall with injury in the last year?  No    Safety Assessment    Throw rugs on floor.  Yes  Handrails on all stairs.  Yes  Good lighting in all hallways.  Yes  Difficulty hearing.  Yes  Patient  counseled about all safety risks that were identified.    Functional Assessment ADLs    Are there any barriers preventing you from cooking for yourself or meeting nutritional needs?  No.    Are there any barriers preventing you from driving safely or obtaining transportation?  No.    Are there any barriers preventing you from using a telephone or calling for help?  No.    Are there any barriers preventing you from shopping?  No.    Are there any barriers preventing you from taking care of your own finances?  No.    Are there any barriers preventing you from managing your medications?  No.    Are there any barriers preventing you from showering, bathing or dressing yourself?  No.    Are you currently engaging in any exercise or physical activity?  Yes.     What is your perception of your health?  Excellent.             Patient Care Team:  Alona Pino M.D. as PCP - General (Family Medicine)  Soco Arzola R.N. as Registered Nurse  GI:   [facility]  Optometry:  Dr. Loco  Physical Med: Dr. Knight  PT:  [email or web address]  Card:  [email or web address]  Derm:  Dr. Crump      Social History   Substance Use Topics   • Smoking status: Never Smoker   • Smokeless tobacco: Never Used   • Alcohol use Yes      Comment: rare     Family History   Problem Relation Age of Onset   • Cancer Mother      uterine   • Hypertension Mother    • Cancer Father    • Cancer Maternal Aunt      breast     She  has a past medical history of Allergy; Benign microscopic hematuria (2/9/2017); DDD (degenerative disc disease), lumbar (5/4/2015); Glaucoma; Hiatal hernia; IBS (irritable bowel syndrome); Osteopenia; PAC (premature atrial contraction); Rosacea; SVT (supraventricular tachycardia) (formerly [facility]) (2/9/2015); and Ulcer (2004).   Past Surgical History:   Procedure Laterality Date   • COLONOSCOPY  2003    recheck 10 years   • ABDOMINAL HYSTERECTOMY TOTAL  1987   • BUNIONECTOMY      x2     REVIEW OF SYSTEMS:  GENERAL: No fatigue, no weight loss.  HEENT:  Ears--no  "earache, no change in hearing, no dizziness, no tinnitus.                 Eyes--no blurred vision, no discharge or pain                 Throat--No sore throat, no dysphagia, no hoarseness  CV:  No chest pain,dyspnea,palpitations or edema.  RESP:  No sob,cough,wheezing or hemoptysis.  GI: No dysphagia, heartburn,abdominal pain, nausea, vomiting, diarrhea or constipation.       No melena, jaundice, bleeding, incontinence or change in bowel habits.  :  No dysuria, polyuria, hematuria, incontinence, or nocturia.  MS:  No joint swelling, myalgias, or arthralgias.  NEURO:  No seizures, syncope, paralysis, tremor, or weakness.  SKIN: No new or concerning skin lesions or changes.   PSYCH: Mood fine.        Exam:     Blood pressure 120/74, pulse 64, temperature 36.4 °C (97.6 °F), resp. rate 14, height 1.651 m (5' 5\"), weight 63.5 kg (140 lb), SpO2 99 %. Body mass index is 23.3 kg/m².    Hearing good.    Dentition good  Alert, oriented in no acute distress.  Eye contact is good, speech goal directed, affect calm  PHYSICAL EXAM;  GENERAL;  WN/WD, No acute distress.   HEENT:  Head normocephalic and atraumatic.    PERRLA, EOMI. No conjunctival injection, no icterus.     TM's normal, nasal mucosa and mouth with no abnormalities.    Oropharynx is clear with no lesions.  NECK: Supple, no adenopathy or thyromegaly.  CV: RR. Normal S1,S2. No murmur, gallop or rub.          No JVD, Carotid pulses 2+ and sym. No bruits.  LUNGS:  Clear, no wheezes, rales or rhonchi.  BREASTS: Symmetric, no masses or tenderness. No nipple discharge.  AXILLA:  No masses or tenderness.  ABDOMEN: Soft, NT, nondistended. Bowel sounds normal. No hepatosplenomegaly or masses. No rebound or guarding. No hernias.  EXTREMITIES:  No edema.   NEURO:  CN II-XII intact. Motor and sensation grossly intact.  SKIN: No rashes or abnormal lesions.  Psych: Mood and affect are appropriate.          Assessment and Plan. The following treatment and monitoring plan is " recommended:    1. Medicare annual wellness visit, subsequent . Following diagnoses are dressed.     2. SVT (supraventricular tachycardia) (HCC). Stable. She is status post ablation and on metoprolol. She has had no palpitations or episodes of SVT.     3. Benign microscopic hematuria. Stable     4. DDD (degenerative disc disease), cervical . Stable. She continues with exercises and occasional physical therapy.     5. DDD (degenerative disc disease), lumbar. Stable. Continue with exercises and occasional PT.     6. Essential hypertension. Controlled     7. Rosacea . Continue follow-up with dermatology.     8. Irritable bowel syndrome, unspecified type. Rare episodes, good response with Levsin.     9. Seasonal allergic rhinitis, unspecified trigger . She continues with Flonase and Astelin.       10. Healthcare Maintenance. Counseled re: nutrition, activity and safety. Reviewed immunizations.     Services needed: No services needed at this time  Health Care Screening recommendations as per orders if indicated.  Referrals offered: PT/OT/Nutrition counseling/Behavioral Health/Smoking cessation as per orders if indicated.    Discussion today about general wellness and lifestyle habits:    · Prevent falls and reduce trip hazards; Cautioned about securing or removing rugs.  · Have a working fire alarm and carbon monoxide detector;   · Engage in regular physical activity and social activities   ·       Follow-up: No Follow-up on file.

## 2018-06-06 ENCOUNTER — TELEPHONE (OUTPATIENT)
Dept: INTERNAL MEDICINE | Facility: IMAGING CENTER | Age: 73
End: 2018-06-06

## 2018-06-06 DIAGNOSIS — I10 ESSENTIAL HYPERTENSION: ICD-10-CM

## 2018-06-06 NOTE — TELEPHONE ENCOUNTER
----- Message from Soco Arzola R.N. sent at 6/6/2018 11:53 AM PDT -----  Please add labs.    Thank you

## 2018-06-07 ENCOUNTER — NON-PROVIDER VISIT (OUTPATIENT)
Dept: INTERNAL MEDICINE | Facility: IMAGING CENTER | Age: 73
End: 2018-06-07
Payer: MEDICARE

## 2018-06-07 ENCOUNTER — HOSPITAL ENCOUNTER (OUTPATIENT)
Facility: MEDICAL CENTER | Age: 73
End: 2018-06-07
Attending: FAMILY MEDICINE
Payer: MEDICARE

## 2018-06-07 DIAGNOSIS — Z01.89 ROUTINE LAB DRAW: ICD-10-CM

## 2018-06-07 DIAGNOSIS — I10 ESSENTIAL HYPERTENSION: ICD-10-CM

## 2018-06-07 LAB
ALBUMIN SERPL BCP-MCNC: 4 G/DL (ref 3.2–4.9)
ALBUMIN/GLOB SERPL: 1.4 G/DL
ALP SERPL-CCNC: 45 U/L (ref 30–99)
ALT SERPL-CCNC: 20 U/L (ref 2–50)
ANION GAP SERPL CALC-SCNC: 6 MMOL/L (ref 0–11.9)
AST SERPL-CCNC: 24 U/L (ref 12–45)
BILIRUB SERPL-MCNC: 1.1 MG/DL (ref 0.1–1.5)
BUN SERPL-MCNC: 22 MG/DL (ref 8–22)
CALCIUM SERPL-MCNC: 9.4 MG/DL (ref 8.5–10.5)
CHLORIDE SERPL-SCNC: 108 MMOL/L (ref 96–112)
CHOLEST SERPL-MCNC: 191 MG/DL (ref 100–199)
CO2 SERPL-SCNC: 25 MMOL/L (ref 20–33)
CREAT SERPL-MCNC: 0.94 MG/DL (ref 0.5–1.4)
GLOBULIN SER CALC-MCNC: 2.9 G/DL (ref 1.9–3.5)
GLUCOSE SERPL-MCNC: 97 MG/DL (ref 65–99)
HDLC SERPL-MCNC: 76 MG/DL
LDLC SERPL CALC-MCNC: 102 MG/DL
POTASSIUM SERPL-SCNC: 4.1 MMOL/L (ref 3.6–5.5)
PROT SERPL-MCNC: 6.9 G/DL (ref 6–8.2)
SODIUM SERPL-SCNC: 139 MMOL/L (ref 135–145)
TRIGL SERPL-MCNC: 64 MG/DL (ref 0–149)

## 2018-06-07 PROCEDURE — 80061 LIPID PANEL: CPT

## 2018-06-07 PROCEDURE — 80053 COMPREHEN METABOLIC PANEL: CPT

## 2018-06-27 DIAGNOSIS — Z78.0 MENOPAUSE: ICD-10-CM

## 2018-06-27 RX ORDER — ESTRADIOL 0.5 MG/1
TABLET ORAL
Qty: 90 TAB | Refills: 3 | Status: SHIPPED | OUTPATIENT
Start: 2018-06-27 | End: 2019-06-27 | Stop reason: SDUPTHER

## 2018-07-09 ENCOUNTER — OFFICE VISIT (OUTPATIENT)
Dept: INTERNAL MEDICINE | Facility: IMAGING CENTER | Age: 73
End: 2018-07-09
Payer: MEDICARE

## 2018-07-09 VITALS
OXYGEN SATURATION: 100 % | HEART RATE: 65 BPM | DIASTOLIC BLOOD PRESSURE: 64 MMHG | SYSTOLIC BLOOD PRESSURE: 120 MMHG | TEMPERATURE: 98.3 F | RESPIRATION RATE: 14 BRPM | BODY MASS INDEX: 23.32 KG/M2 | WEIGHT: 140 LBS | HEIGHT: 65 IN

## 2018-07-09 DIAGNOSIS — L02.93 CARBUNCLE: ICD-10-CM

## 2018-07-09 DIAGNOSIS — I10 ESSENTIAL HYPERTENSION: ICD-10-CM

## 2018-07-09 PROCEDURE — 99213 OFFICE O/P EST LOW 20 MIN: CPT | Performed by: FAMILY MEDICINE

## 2018-07-09 RX ORDER — LOSARTAN POTASSIUM 50 MG/1
TABLET ORAL
Qty: 90 TAB | Refills: 3 | Status: SHIPPED | OUTPATIENT
Start: 2018-07-09 | End: 2019-06-27 | Stop reason: SDUPTHER

## 2018-07-09 RX ORDER — AMOXICILLIN AND CLAVULANATE POTASSIUM 875; 125 MG/1; MG/1
1 TABLET, FILM COATED ORAL 2 TIMES DAILY
Qty: 14 TAB | Refills: 0 | Status: SHIPPED | OUTPATIENT
Start: 2018-07-09 | End: 2019-04-16

## 2018-07-09 NOTE — PROGRESS NOTES
Chief Complaint   Patient presents with   • Abscess     boil on finger       HISTORY OF PRESENT ILLNESS: Patient is a 73 y.o. female established patient who presents today complaining of a small boil on her left ring finger for about the past 5 days. She noticed it about 5 days. She does not remember how it got started. No injuries. She does work in the yard. It is red and swollen. She has attempted to drain it herself and got a little bit of pus out on one occasion. She is leaving town in a few days.  She is up-to-date on her tetanus.      Patient Active Problem List    Diagnosis Date Noted   • SVT (supraventricular tachycardia) ([facility]) 02/09/2015     Priority: High   • Benign microscopic hematuria 02/09/2017   • Cervical stenosis of spinal canal 08/08/2016   • DDD (degenerative disc disease), cervical 07/01/2015   • DDD (degenerative disc disease), lumbar 05/04/2015   • History of PUD (peptic ulcer disease) 02/07/2015   • Hypertension 06/18/2013   • Allergic state    • Rosacea    • IBS (irritable bowel syndrome)    • Osteopenia    • Hiatal hernia      Current Outpatient Prescriptions on File Prior to Visit   Medication Sig Dispense Refill   • estradiol (ESTRACE) 0.5 MG tablet TAKE 1 TABLET BY MOUTH DAILY 90 Tab 3   • azelastine (ASTELIN) 137 MCG/SPRAY nasal spray [place] 1 Spray in nose 2 times a day. 30 mL 6   • metoprolol (LOPRESSOR) 25 MG Tab TAKE 1 TABLET BY MOUTH TWICE DAILY 180 Tab 3   • SOOLANTRA 1 % Cream APPLY TO THE AFFECTED AREA ONCE D  4   • hyoscyamine (LEVSIN) 0.125 MG tablet Take 1 Tab by mouth every 6 hours as needed for Cramping. 30 Tab 1   • FINACEA 15 % Gel 1 APPLICATION APPLY ON THE SKIN TWICE A DAY  6   • TRAVATAN Z 0.004 % Solution INSTILL 1 GTT IN OU HS  3   • omeprazole (PRILOSEC) 20 MG delayed-release capsule TAKE 1 CAPSULE BY MOUTH EVERY DAY. (Patient taking differently: TAKE 1 CAPSULE BY MOUTH EVERY DAY. prn) 90 Cap 3   • AVENOVA 0.01 % Solution APPLY 1ML TO EYELIDS DAILY  3   •  "metronidazole (METROLOTION) 0.75 % Lotion 1 APPLICATION APPLY ON THE SKIN TWICE A DAY  6   • PATADAY 0.2 % Solution INSTILL 1 DROP IN BOTH EYES DAILY  4   • LUTEIN PO Take 1 Tab by mouth every day.     • Calcium Carbonate-Vitamin D (CALCIUM + D PO) Take 1 Tab by mouth every day.     • fluticasone (FLONASE) 50 MCG/ACT nasal spray USE 2 SPRAYS IN EACH NOSTRIL DAILY 16 Bottle 2   • Omega-3 Fatty Acids (FISH OIL) 1000 MG CAPS capsule Take 1,000 mg by mouth every day.       No current facility-administered medications on file prior to visit.          Past medical, surgical, family, and social history is reviewed and updated in Epic chart by me today.   Medications and allergies reviewed and updated in Epic chart by me today.     REVIEW OF SYSTEMS:  GENERAL: No fatigue, no weight loss.  CV:  No chest pain,dyspnea,palpitations or edema.  RESP:  No sob,cough,wheezing or hemoptysis.  GI: No dysphagia, heartburn,abdominal pain, nausea, vomiting, diarrhea or constipation.       No melena, jaundice, bleeding, incontinence or change in bowel habits.  :  No dysuria, polyuria, hematuria, incontinence, or nocturia.  MS:  No joint swelling, myalgias, or arthralgias.  NEURO:  No seizures, syncope, paralysis, tremor, or weakness.  SKIN: as above.  PSYCH: Mood fine.    Vitals:    07/09/18 1000   BP: 120/64   Pulse: 65   Resp: 14   Temp: 36.8 °C (98.3 °F)   SpO2: 100%   Weight: 63.5 kg (140 lb)   Height: 1.651 m (5' 5\")     Physical Exam:  Gen: Well developed, well nourished. No acute distress.  Neck:  Supple, no adenopathy or thyromegaly.  Heart:  Regular rate and rhythm.  Normal S1, S2. No murmur, gallop or rub.  Lungs:  Clear, No wheezes,rales or rhonchi.  Extremities:  No edema.  Skin: Left ring finger with erythema, swelling and tenderness at ulnar aspect of proximal phalanx. Centrally there is 3mm area of purulence.  Psych: Mood and affect are appropriate.        Assessment/Plan:  1. Carbuncle . Using sterile technique, local " anesthesia with 2% Xylocaine was used to anesthetize the area. An 3 mm incision was made over the purulent area. Blood and purulent material were expressed. The area was probed looking for any foreign body. None was found. She is instructed to soak the area twice daily, Augmentin twice daily, call for any concerns.

## 2018-09-17 RX ORDER — FLUTICASONE PROPIONATE 50 MCG
SPRAY, SUSPENSION (ML) NASAL
Qty: 3 BOTTLE | Refills: 3 | Status: SHIPPED | OUTPATIENT
Start: 2018-09-17 | End: 2019-08-22

## 2018-10-03 ENCOUNTER — PATIENT MESSAGE (OUTPATIENT)
Dept: INTERNAL MEDICINE | Facility: IMAGING CENTER | Age: 73
End: 2018-10-03

## 2018-10-03 DIAGNOSIS — Z78.0 POST-MENOPAUSE: ICD-10-CM

## 2018-12-21 ENCOUNTER — PATIENT MESSAGE (OUTPATIENT)
Dept: INTERNAL MEDICINE | Facility: IMAGING CENTER | Age: 73
End: 2018-12-21

## 2018-12-21 DIAGNOSIS — K58.9 IRRITABLE BOWEL SYNDROME, UNSPECIFIED TYPE: ICD-10-CM

## 2018-12-21 RX ORDER — HYOSCYAMINE SULFATE 0.125 MG
125 TABLET ORAL EVERY 6 HOURS PRN
Qty: 30 TAB | Refills: 1 | Status: SHIPPED | OUTPATIENT
Start: 2018-12-21 | End: 2020-02-24 | Stop reason: SDUPTHER

## 2018-12-21 RX ORDER — OMEPRAZOLE 20 MG/1
20 CAPSULE, DELAYED RELEASE ORAL
Qty: 90 CAP | Refills: 1 | Status: SHIPPED | OUTPATIENT
Start: 2018-12-21 | End: 2019-08-22

## 2018-12-21 NOTE — TELEPHONE ENCOUNTER
From: Heriberto Nichols  To: Alona Pino M.D.  Sent: 12/21/2018 8:27 AM PST  Subject: Prescription Question    Hi Alona,    I have 2 Rxs which I don't use often and are out of date. Would you refill these for me just so I have them available if I need either? Omeprazole 20 mg and hyoscyamine sulfate 0.125mg.   Thanks.    May this holiday season, although difficult, be blessed with happy memories and the start of new traditions.      Love,  Jaun

## 2019-01-08 ENCOUNTER — PATIENT MESSAGE (OUTPATIENT)
Dept: INTERNAL MEDICINE | Facility: IMAGING CENTER | Age: 74
End: 2019-01-08

## 2019-01-08 DIAGNOSIS — M51.36 DDD (DEGENERATIVE DISC DISEASE), LUMBAR: ICD-10-CM

## 2019-01-08 DIAGNOSIS — M50.30 DDD (DEGENERATIVE DISC DISEASE), CERVICAL: ICD-10-CM

## 2019-01-08 NOTE — TELEPHONE ENCOUNTER
From: Heriberto Nichols  To: Alona Pino M.D.  Sent: 1/8/2019 12:47 PM PST  Subject: Non-Urgent Medical Question    Hi Alona, Happy New Year. Couple things for me but first Mom is in hospice care. I visited and was rough but she rallied before I left. She is a stubborn, tough fighter.    I'd like to restart PT. Would you send a script to [facility] for me? Also I've been getting sinus headaches and I have been using coricidin for about 2 weeks and nedi pot. No fever nor aches and pains. Just can't seem to get rid of phlegm in throat and some nasal discharge and headaches. Any suggestions?    Juan

## 2019-04-16 ENCOUNTER — OFFICE VISIT (OUTPATIENT)
Dept: INTERNAL MEDICINE | Facility: IMAGING CENTER | Age: 74
End: 2019-04-16
Payer: MEDICARE

## 2019-04-16 VITALS
HEART RATE: 60 BPM | RESPIRATION RATE: 14 BRPM | OXYGEN SATURATION: 98 % | DIASTOLIC BLOOD PRESSURE: 80 MMHG | TEMPERATURE: 98.7 F | SYSTOLIC BLOOD PRESSURE: 124 MMHG | HEIGHT: 65 IN | WEIGHT: 135 LBS | BODY MASS INDEX: 22.49 KG/M2

## 2019-04-16 DIAGNOSIS — H91.93 DECREASED HEARING OF BOTH EARS: ICD-10-CM

## 2019-04-16 DIAGNOSIS — J30.2 SEASONAL ALLERGIES: ICD-10-CM

## 2019-04-16 PROCEDURE — 96372 THER/PROPH/DIAG INJ SC/IM: CPT | Performed by: FAMILY MEDICINE

## 2019-04-16 PROCEDURE — 99213 OFFICE O/P EST LOW 20 MIN: CPT | Mod: 25 | Performed by: FAMILY MEDICINE

## 2019-04-16 RX ORDER — TRIAMCINOLONE ACETONIDE 40 MG/ML
40 INJECTION, SUSPENSION INTRA-ARTICULAR; INTRAMUSCULAR ONCE
Status: COMPLETED | OUTPATIENT
Start: 2019-04-16 | End: 2019-04-16

## 2019-04-16 RX ADMIN — TRIAMCINOLONE ACETONIDE 40 MG: 40 INJECTION, SUSPENSION INTRA-ARTICULAR; INTRAMUSCULAR at 11:24

## 2019-04-16 ASSESSMENT — PATIENT HEALTH QUESTIONNAIRE - PHQ9: CLINICAL INTERPRETATION OF PHQ2 SCORE: 0

## 2019-04-16 NOTE — PROGRESS NOTES
Chief Complaint   Patient presents with   • Allergic Rhinitis       HISTORY OF PRESENT ILLNESS: Patient is a 74 y.o. female established patient who presents today complaining of allergy symptoms.  She has had seasonal allergies for as long as she can remember.  Back in the 60s and 70s she actually had allergy shots which seemed to help.  She saw Dr. Griffin at that time.  Then she moved away to [facility] for short time and then when she moved back her allergies have returned and it is slowly gotten worse.  This year she has a lot of eye injection and itching.  Nasal congestion, sneezing, little sore throat.  She feels like she gets drainage in her throat.  She is using Flonase.  Astelin offered no improvement.  She does a [place] pot daily.  She tries Claritin or another antihistamine fairly regularly.  Minimal lung symptoms.  No cough or chest congestion.  No wheezing.  No fevers or chills.    She also thinks her hearing has decreased over the past 5 years and she has seen the audiologist and Dr. Urban's office in the past.      Patient Active Problem List    Diagnosis Date Noted   • SVT (supraventricular tachycardia) ([facility]) 02/09/2015     Priority: High   • Benign microscopic hematuria 02/09/2017   • Cervical stenosis of spinal canal 08/08/2016   • DDD (degenerative disc disease), cervical 07/01/2015   • DDD (degenerative disc disease), lumbar 05/04/2015   • History of PUD (peptic ulcer disease) 02/07/2015   • Hypertension 06/18/2013   • Allergic state    • Rosacea    • IBS (irritable bowel syndrome)    • Osteopenia    • Hiatal hernia        Current Outpatient Prescriptions:   •  metoprolol (LOPRESSOR) 25 MG Tab, TAKE 1 TABLET BY MOUTH TWICE DAILY, Disp: 180 Tab, Rfl: 3  •  fluticasone (FLONASE) 50 MCG/ACT nasal spray, SHAKE LIQUID AND USE 2 SPRAYS IN EACH NOSTRIL EVERY DAY, Disp: 3 Bottle, Rfl: 3  •  losartan (COZAAR) 50 MG Tab, TAKE 1 TABLET BY MOUTH EVERY DAY, Disp: 90 Tab, Rfl: 3  •  PATADAY 0.2 % Solution,  INSTILL 1 DROP IN BOTH EYES DAILY, Disp: , Rfl: 4  •  fluticasone (FLONASE) 50 MCG/ACT nasal spray, USE 2 SPRAYS IN EACH NOSTRIL DAILY, Disp: 16 Bottle, Rfl: 2  •  omeprazole (PRILOSEC) 20 MG delayed-release capsule, Take 1 Cap by mouth every day., Disp: 90 Cap, Rfl: 1  •  hyoscyamine (LEVSIN) 0.125 MG tablet, Take 1 Tab by mouth every 6 hours as needed for Cramping., Disp: 30 Tab, Rfl: 1  •  estradiol (ESTRACE) 0.5 MG tablet, TAKE 1 TABLET BY MOUTH DAILY, Disp: 90 Tab, Rfl: 3  •  SOOLANTRA 1 % Cream, APPLY TO THE AFFECTED AREA ONCE D, Disp: , Rfl: 4  •  FINACEA 15 % Gel, 1 APPLICATION APPLY ON THE SKIN TWICE A DAY, Disp: , Rfl: 6  •  TRAVATAN Z 0.004 % Solution, INSTILL 1 GTT IN OU HS, Disp: , Rfl: 3  •  AVENOVA 0.01 % Solution, APPLY 1ML TO EYELIDS DAILY, Disp: , Rfl: 3  •  metronidazole (METROLOTION) 0.75 % Lotion, 1 APPLICATION APPLY ON THE SKIN TWICE A DAY, Disp: , Rfl: 6  •  LUTEIN PO, Take 1 Tab by mouth every day., Disp: , Rfl:   •  Calcium Carbonate-Vitamin D (CALCIUM + D PO), Take 1 Tab by mouth every day., Disp: , Rfl:   •  Omega-3 Fatty Acids (FISH OIL) 1000 MG CAPS capsule, Take 1,000 mg by mouth every day., Disp: , Rfl:       Past medical, surgical, family, and social history is reviewed and updated in Epic chart by me today.   Medications and allergies reviewed and updated in Epic chart by me today.     REVIEW OF SYSTEMS:  GENERAL: No fatigue, no weight loss.  HEENT:  Ears--no earache, no change in hearing, no dizziness, no tinnitus.                 Eyes--some redness and itching.                 Throat--scratchy throat, no dysphagia, mild hoarseness, especially as the day goes on.                  Nasal congestion.  CV:  No chest pain,dyspnea,palpitations or edema.  RESP:  No sob,cough,wheezing or hemoptysis.  GI: No dysphagia, heartburn,abdominal pain, nausea, vomiting, diarrhea or constipation.       No melena, jaundice, bleeding, incontinence or change in bowel habits.  :  No dysuria, polyuria,  "hematuria, incontinence, or nocturia.  MS:  No joint swelling, myalgias, or arthralgias.  NEURO:  No seizures, syncope, paralysis, tremor, or weakness.  SKIN: No new or concerning skin lesions or changes.   PSYCH: Mood fine.    Vitals:    04/16/19 1000   BP: 124/80   Pulse: 60   Resp: 14   Temp: 37.1 °C (98.7 °F)   TempSrc: Temporal   SpO2: 98%   Weight: 61.2 kg (135 lb)   Height: 1.651 m (5' 5\")     Physical Exam:  GENERAL;  WD/WN, No acute distress.  HEENT:  PERRLA, EOMI, no icterus.  Both conjunctiva are mildly injected.  Sclera is mildly injected.  There is increased tearing.  No purulent drainage.                 TM's normal bilat.                 Nasal mucosa erythematous and edematous.                 Pharynx injected. No exudate.  NECK: Supple with no adenopathy or thyromegaly.  LUNGS: Clear with no rales, rhonchi, or wheezes.  COR: RR with no murmur, gallop or rub.  EXT: No edema.      Assessment/Plan:  1. Seasonal allergies.  She will continue with Flonase, and an antihistamine.  She has been using Pataday eyedrops.  Continue with her [place] pot.  Kenalog 40 today.  Monitor symptoms.  If she does not get significant improvement or does not last very long, then referral to allergy. triamcinolone acetonide (KENALOG-40) injection 40 mg   2.  Decreased hearing.  She will touch bases with Dr. Urban's office.     "

## 2019-06-27 DIAGNOSIS — Z78.0 MENOPAUSE: ICD-10-CM

## 2019-06-27 DIAGNOSIS — I10 ESSENTIAL HYPERTENSION: ICD-10-CM

## 2019-06-27 RX ORDER — LOSARTAN POTASSIUM 50 MG/1
TABLET ORAL
Qty: 90 TAB | Refills: 3 | Status: SHIPPED | OUTPATIENT
Start: 2019-06-27 | End: 2020-09-10

## 2019-06-27 RX ORDER — ESTRADIOL 0.5 MG/1
TABLET ORAL
Qty: 90 TAB | Refills: 3 | Status: SHIPPED | OUTPATIENT
Start: 2019-06-27 | End: 2020-06-22 | Stop reason: SDUPTHER

## 2019-08-07 ENCOUNTER — HOSPITAL ENCOUNTER (OUTPATIENT)
Dept: RADIOLOGY | Facility: MEDICAL CENTER | Age: 74
End: 2019-08-07
Attending: OTOLARYNGOLOGY
Payer: MEDICARE

## 2019-08-07 DIAGNOSIS — R49.0 HOARSENESS: ICD-10-CM

## 2019-08-07 DIAGNOSIS — R13.10 DYSPHAGIA, IDIOPATHIC: ICD-10-CM

## 2019-08-07 PROCEDURE — 74220 X-RAY XM ESOPHAGUS 1CNTRST: CPT

## 2019-08-16 DIAGNOSIS — E55.9 VITAMIN D DEFICIENCY: ICD-10-CM

## 2019-08-16 DIAGNOSIS — I10 ESSENTIAL HYPERTENSION: ICD-10-CM

## 2019-08-16 DIAGNOSIS — I47.10 SVT (SUPRAVENTRICULAR TACHYCARDIA): ICD-10-CM

## 2019-08-19 ENCOUNTER — NON-PROVIDER VISIT (OUTPATIENT)
Dept: INTERNAL MEDICINE | Facility: IMAGING CENTER | Age: 74
End: 2019-08-19
Payer: MEDICARE

## 2019-08-19 ENCOUNTER — HOSPITAL ENCOUNTER (OUTPATIENT)
Facility: MEDICAL CENTER | Age: 74
End: 2019-08-19
Attending: FAMILY MEDICINE
Payer: MEDICARE

## 2019-08-19 DIAGNOSIS — E55.9 VITAMIN D DEFICIENCY: ICD-10-CM

## 2019-08-19 DIAGNOSIS — I47.10 SVT (SUPRAVENTRICULAR TACHYCARDIA): ICD-10-CM

## 2019-08-19 DIAGNOSIS — I10 ESSENTIAL HYPERTENSION: ICD-10-CM

## 2019-08-19 LAB
25(OH)D3 SERPL-MCNC: 43 NG/ML (ref 30–100)
ALBUMIN SERPL BCP-MCNC: 3.9 G/DL (ref 3.2–4.9)
ALBUMIN/GLOB SERPL: 1.4 G/DL
ALP SERPL-CCNC: 39 U/L (ref 30–99)
ALT SERPL-CCNC: 17 U/L (ref 2–50)
ANION GAP SERPL CALC-SCNC: 7 MMOL/L (ref 0–11.9)
AST SERPL-CCNC: 20 U/L (ref 12–45)
BASOPHILS # BLD AUTO: 1.6 % (ref 0–1.8)
BASOPHILS # BLD: 0.06 K/UL (ref 0–0.12)
BILIRUB SERPL-MCNC: 1.8 MG/DL (ref 0.1–1.5)
BUN SERPL-MCNC: 15 MG/DL (ref 8–22)
CALCIUM SERPL-MCNC: 9.7 MG/DL (ref 8.5–10.5)
CHLORIDE SERPL-SCNC: 107 MMOL/L (ref 96–112)
CHOLEST SERPL-MCNC: 193 MG/DL (ref 100–199)
CO2 SERPL-SCNC: 26 MMOL/L (ref 20–33)
CREAT SERPL-MCNC: 1.05 MG/DL (ref 0.5–1.4)
EOSINOPHIL # BLD AUTO: 0.11 K/UL (ref 0–0.51)
EOSINOPHIL NFR BLD: 2.9 % (ref 0–6.9)
ERYTHROCYTE [DISTWIDTH] IN BLOOD BY AUTOMATED COUNT: 42.3 FL (ref 35.9–50)
GLOBULIN SER CALC-MCNC: 2.7 G/DL (ref 1.9–3.5)
GLUCOSE SERPL-MCNC: 96 MG/DL (ref 65–99)
HCT VFR BLD AUTO: 44.1 % (ref 37–47)
HDLC SERPL-MCNC: 72 MG/DL
HGB BLD-MCNC: 14.6 G/DL (ref 12–16)
IMM GRANULOCYTES # BLD AUTO: 0.01 K/UL (ref 0–0.11)
IMM GRANULOCYTES NFR BLD AUTO: 0.3 % (ref 0–0.9)
LDLC SERPL CALC-MCNC: 106 MG/DL
LYMPHOCYTES # BLD AUTO: 1.22 K/UL (ref 1–4.8)
LYMPHOCYTES NFR BLD: 32.6 % (ref 22–41)
MCH RBC QN AUTO: 31.7 PG (ref 27–33)
MCHC RBC AUTO-ENTMCNC: 33.1 G/DL (ref 33.6–35)
MCV RBC AUTO: 95.9 FL (ref 81.4–97.8)
MONOCYTES # BLD AUTO: 0.43 K/UL (ref 0–0.85)
MONOCYTES NFR BLD AUTO: 11.5 % (ref 0–13.4)
NEUTROPHILS # BLD AUTO: 1.91 K/UL (ref 2–7.15)
NEUTROPHILS NFR BLD: 51.1 % (ref 44–72)
NRBC # BLD AUTO: 0 K/UL
NRBC BLD-RTO: 0 /100 WBC
PLATELET # BLD AUTO: 202 K/UL (ref 164–446)
PMV BLD AUTO: 11.1 FL (ref 9–12.9)
POTASSIUM SERPL-SCNC: 3.8 MMOL/L (ref 3.6–5.5)
PROT SERPL-MCNC: 6.6 G/DL (ref 6–8.2)
RBC # BLD AUTO: 3.59 M/UL (ref 4.2–5.4)
SODIUM SERPL-SCNC: 140 MMOL/L (ref 135–145)
TRIGL SERPL-MCNC: 73 MG/DL (ref 0–149)
TSH SERPL DL<=0.005 MIU/L-ACNC: 2.77 UIU/ML (ref 0.38–5.33)
WBC # BLD AUTO: 3.7 K/UL (ref 4.8–10.8)

## 2019-08-19 PROCEDURE — 82306 VITAMIN D 25 HYDROXY: CPT

## 2019-08-19 PROCEDURE — 80061 LIPID PANEL: CPT

## 2019-08-19 PROCEDURE — 85025 COMPLETE CBC W/AUTO DIFF WBC: CPT

## 2019-08-19 PROCEDURE — 84443 ASSAY THYROID STIM HORMONE: CPT

## 2019-08-19 PROCEDURE — 80053 COMPREHEN METABOLIC PANEL: CPT

## 2019-08-22 ENCOUNTER — OFFICE VISIT (OUTPATIENT)
Dept: INTERNAL MEDICINE | Facility: IMAGING CENTER | Age: 74
End: 2019-08-22
Payer: MEDICARE

## 2019-08-22 VITALS
OXYGEN SATURATION: 97 % | BODY MASS INDEX: 23.16 KG/M2 | SYSTOLIC BLOOD PRESSURE: 110 MMHG | TEMPERATURE: 97.7 F | WEIGHT: 139 LBS | RESPIRATION RATE: 14 BRPM | HEIGHT: 65 IN | DIASTOLIC BLOOD PRESSURE: 64 MMHG | HEART RATE: 77 BPM

## 2019-08-22 DIAGNOSIS — M85.851 OSTEOPENIA OF BOTH HIPS: ICD-10-CM

## 2019-08-22 DIAGNOSIS — Z00.00 MEDICARE ANNUAL WELLNESS VISIT, SUBSEQUENT: ICD-10-CM

## 2019-08-22 DIAGNOSIS — I47.10 SVT (SUPRAVENTRICULAR TACHYCARDIA): ICD-10-CM

## 2019-08-22 DIAGNOSIS — M85.852 OSTEOPENIA OF BOTH HIPS: ICD-10-CM

## 2019-08-22 DIAGNOSIS — K21.9 GASTROESOPHAGEAL REFLUX DISEASE WITHOUT ESOPHAGITIS: ICD-10-CM

## 2019-08-22 DIAGNOSIS — L71.9 ROSACEA: ICD-10-CM

## 2019-08-22 DIAGNOSIS — D70.9 NEUTROPENIA, UNSPECIFIED TYPE (HCC): ICD-10-CM

## 2019-08-22 DIAGNOSIS — I10 ESSENTIAL HYPERTENSION: ICD-10-CM

## 2019-08-22 DIAGNOSIS — K44.9 HIATAL HERNIA: ICD-10-CM

## 2019-08-22 PROCEDURE — G0439 PPPS, SUBSEQ VISIT: HCPCS | Performed by: FAMILY MEDICINE

## 2019-08-22 RX ORDER — RANITIDINE 150 MG/1
150 TABLET ORAL 2 TIMES DAILY
COMMUNITY
Start: 2019-08-13 | End: 2020-02-13

## 2019-08-22 ASSESSMENT — ENCOUNTER SYMPTOMS: GENERAL WELL-BEING: EXCELLENT

## 2019-08-22 ASSESSMENT — ACTIVITIES OF DAILY LIVING (ADL): BATHING_REQUIRES_ASSISTANCE: 0

## 2019-08-22 ASSESSMENT — PATIENT HEALTH QUESTIONNAIRE - PHQ9: CLINICAL INTERPRETATION OF PHQ2 SCORE: 0

## 2019-08-22 NOTE — PROGRESS NOTES
Chief Complaint   Patient presents with   • Medicare Annual Wellness         HPI:  Heriberto is a 74 y.o. established female patient here for Medicare Annual Wellness Visit   She is generally doing well.  She did have a stressful year.  She lost her mom at age 89 and has a dog that is been ill.  She has not been exercising as much as usual.    She was diagnosed with reflux and is now on Zantac 150 mg twice daily.  That does seem to be helping.  She still has a hoarse voice.  She did see ENT.        Patient Active Problem List    Diagnosis Date Noted   • SVT (supraventricular tachycardia) ([facility]) 02/09/2015     Priority: High   • Gastroesophageal reflux disease without esophagitis 08/22/2019   • Benign microscopic hematuria 02/09/2017   • Cervical stenosis of spinal canal 08/08/2016   • DDD (degenerative disc disease), cervical 07/01/2015   • DDD (degenerative disc disease), lumbar 05/04/2015   • History of PUD (peptic ulcer disease) 02/07/2015   • Hypertension 06/18/2013   • Allergic state    • Rosacea    • IBS (irritable bowel syndrome)    • Osteopenia    • Hiatal hernia        Current Outpatient Medications   Medication Sig Dispense Refill   • raNITidine (ZANTAC) 150 MG Tab Take 150 mg by mouth 2 times a day.     • estradiol (ESTRACE) 0.5 MG tablet TAKE 1 TABLET BY MOUTH DAILY 90 Tab 3   • losartan (COZAAR) 50 MG Tab TAKE 1 TABLET BY MOUTH EVERY DAY 90 Tab 3   • hyoscyamine (LEVSIN) 0.125 MG tablet Take 1 Tab by mouth every 6 hours as needed for Cramping. 30 Tab 1   • metoprolol (LOPRESSOR) 25 MG Tab TAKE 1 TABLET BY MOUTH TWICE DAILY 180 Tab 3   • SOOLANTRA 1 % Cream APPLY TO THE AFFECTED AREA ONCE D  4   • FINACEA 15 % Gel 1 APPLICATION APPLY ON THE SKIN TWICE A DAY  6   • AVENOVA 0.01 % Solution APPLY 1ML TO EYELIDS DAILY  3   • PATADAY 0.2 % Solution INSTILL 1 DROP IN BOTH EYES DAILY  4   • LUTEIN PO Take 1 Tab by mouth every day.     • Calcium Carbonate-Vitamin D (CALCIUM + D PO) Take 1 Tab by mouth every day.      • fluticasone (FLONASE) 50 MCG/ACT nasal spray USE 2 SPRAYS IN EACH NOSTRIL DAILY 16 Bottle 2   • Omega-3 Fatty Acids (FISH OIL) 1000 MG CAPS capsule Take 1,000 mg by mouth every day.     • TRAVATAN Z 0.004 % Solution INSTILL 1 GTT IN OU HS  3   • metronidazole (METROLOTION) 0.75 % Lotion 1 APPLICATION APPLY ON THE SKIN TWICE A DAY  6     No current facility-administered medications for this visit.         The patient reports adherence to this regimen   Current supplements as per medication list.   Chronic narcotic pain medicines: no     Allergies: Asa [aspirin] and Shellfish allergy    Current social contact/activities: Active with family and friends  Exercise: Yes  Is patient current with immunizations?  yes       She  reports that she has never smoked. She has never used smokeless tobacco. She reports that she drinks alcohol. She reports that she does not use drugs.        DPA/Advanced directive: .has an advanced directive - a copy HAS NOT been provided.  Encouraged her to bring in a copy.    ROS:    Gait: Uses no assistive device   Ostomy: no   Other tubes: no   Amputations: no   Chronic oxygen use no   Last eye exam: Within the past 6 months  : Denies incontinence.       Screening:  Depression Screening    Little interest or pleasure in doing things?  0 - not at all  Feeling down, depressed , or hopeless? 0 - not at all  Patient Health Questionnaire Score: 0     If depressive symptoms identified deferred to follow up visit unless specifically addressed in assessment and plan.    Interpretation of PHQ-9 Total Score   Score Severity   1-4 No Depression   5-9 Mild Depression   10-14 Moderate Depression   15-19 Moderately Severe Depression   20-27 Severe Depression    Screening for Cognitive Impairment    Three Minute Recall (village, kitchen, baby) 3/3    Den clock face with all 12 numbers and set the hands to show 10 past 10.  Yes    Cognitive concerns identified deferred for follow up unless specifically  addressed in assessment and plan.    Fall Risk Assessment    Has the patient had two or more falls in the last year or any fall with injury in the last year?  No    Safety Assessment    Throw rugs on floor.  Yes  Handrails on all stairs.  Yes  Good lighting in all hallways.  Yes  Difficulty hearing.  No  Patient counseled about all safety risks that were identified.    Functional Assessment ADLs    Are there any barriers preventing you from cooking for yourself or meeting nutritional needs?  No.    Are there any barriers preventing you from driving safely or obtaining transportation?  No.    Are there any barriers preventing you from using a telephone or calling for help?  No.    Are there any barriers preventing you from shopping?  No.    Are there any barriers preventing you from taking care of your own finances?  No.    Are there any barriers preventing you from managing your medications?  No.    Are there any barriers preventing you from showering, bathing or dressing yourself?  No.    Are you currently engaging in any exercise or physical activity?  Yes.     What is your perception of your health?  Excellent.          Patient Care Team:  Alona Pino M.D. as PCP - General (Family Medicine)  Soco Arzola R.N. as Registered Nurse  GI:   [facility]  Optometry:  Dr. Loco  Physical Med: Dr. Knight  PT:  [facility]  Card:   [employer]  Derm:   Dr. Crump  ENT:  Dr. Pearson    Social History     Tobacco Use   • Smoking status: Never Smoker   • Smokeless tobacco: Never Used   Substance Use Topics   • Alcohol use: Yes     Comment: rare   • Drug use: No     Family History   Problem Relation Age of Onset   • Cancer Mother         uterine   • Hypertension Mother    • Heart Disease Mother         arrythmia   • Cancer Father    • Cancer Maternal Aunt         breast     She  has a past medical history of Allergy, Benign microscopic hematuria (2/9/2017), DDD (degenerative disc disease), lumbar (5/4/2015), Glaucoma,  "Hiatal hernia, IBS (irritable bowel syndrome), Osteopenia, PAC (premature atrial contraction), Rosacea, SVT (supraventricular tachycardia) ([facility]) (2/9/2015), and Ulcer (2004).   Past Surgical History:   Procedure Laterality Date   • COLONOSCOPY  2003    recheck 10 years   • ABDOMINAL HYSTERECTOMY TOTAL  1987   • BUNIONECTOMY      x2     REVIEW OF SYSTEMS:  GENERAL: No fatigue, no weight loss.  HEENT:  Ears--no earache, no change in hearing, no dizziness, no tinnitus.                 Eyes--no blurred vision, no discharge or pain                 Throat--No sore throat, no dysphagia.  Mild chronic hoarseness.  CV:  No chest pain,dyspnea,palpitations or edema.  RESP:  No sob,cough,wheezing or hemoptysis.  GI: No dysphagia, heartburn,abdominal pain, nausea, vomiting, diarrhea or constipation.       No melena, jaundice, bleeding, incontinence or change in bowel habits.  :  No dysuria, polyuria, hematuria, incontinence, or nocturia.  MS:  No joint swelling, myalgias, or arthralgias.  NEURO:  No seizures, syncope, paralysis, tremor, or weakness.  SKIN: No new or concerning skin lesions or changes.   PSYCH: Mood fine.        Exam:     /64   Pulse 77   Temp 36.5 °C (97.7 °F) (Temporal)   Resp 14   Ht 1.651 m (5' 5\")   Wt 63 kg (139 lb)   SpO2 97%  Body mass index is 23.13 kg/m².    Hearing good.    Dentition good  Alert, oriented in no acute distress.  Eye contact is good, speech goal directed, affect calm  PHYSICAL EXAM;  GENERAL;  WN/WD, No acute distress.   HEENT:  Head normocephalic and atraumatic.    PERRLA, EOMI. No conjunctival injection, no icterus.     TM's normal, nasal mucosa and mouth with no abnormalities.    Oropharynx is clear with no lesions.  NECK: Supple, no adenopathy or thyromegaly.  CV: RR. Normal S1,S2. No murmur, gallop or rub.          No JVD, Carotid pulses 2+ and sym. No bruits.  LUNGS:  Clear, no wheezes, rales or rhonchi.  BREASTS: Symmetric, no masses or tenderness. No nipple " discharge.  AXILLA:  No masses or tenderness.  ABDOMEN: Soft, NT, nondistended. Bowel sounds normal. No hepatosplenomegaly or masses. No rebound or guarding. No hernias.  EXTREMITIES:  No edema.   NEURO:  CN II-XII intact. Motor and sensation grossly intact  SKIN: No rashes or abnormal lesions.  Psych: Mood and affect are appropriate.    A chaperone was offered prior to physical exam and patient declined.     Lab Results   Component Value Date/Time    CHOLSTRLTOT 193 08/19/2019 08:00 AM     (H) 08/19/2019 08:00 AM    HDL 72 08/19/2019 08:00 AM    TRIGLYCERIDE 73 08/19/2019 08:00 AM       Lab Results   Component Value Date/Time    SODIUM 140 08/19/2019 08:00 AM    POTASSIUM 3.8 08/19/2019 08:00 AM    CHLORIDE 107 08/19/2019 08:00 AM    CO2 26 08/19/2019 08:00 AM    GLUCOSE 96 08/19/2019 08:00 AM    BUN 15 08/19/2019 08:00 AM    CREATININE 1.05 08/19/2019 08:00 AM    CREATININE 0.99 07/28/2011 09:02 AM    BUNCREATRAT 16 09/24/2014 09:54 AM    BUNCREATRAT 21 07/28/2011 09:02 AM    GLOMRATE 57 (L) 07/19/2010 08:25 AM     Lab Results   Component Value Date/Time    ALKPHOSPHAT 39 08/19/2019 08:00 AM    ASTSGOT 20 08/19/2019 08:00 AM    ALTSGPT 17 08/19/2019 08:00 AM    TBILIRUBIN 1.8 (H) 08/19/2019 08:00 AM        Lab Results   Component Value Date/Time    WBC 3.7 (L) 08/19/2019 08:00 AM    WBC 4.3 07/28/2011 09:02 AM    RBC 3.59 (L) 08/19/2019 08:00 AM    RBC 4.71 07/28/2011 09:02 AM    HEMOGLOBIN 14.6 08/19/2019 08:00 AM    HEMATOCRIT 44.1 08/19/2019 08:00 AM    MCV 95.9 08/19/2019 08:00 AM    MCV 96 07/28/2011 09:02 AM    MCH 31.7 08/19/2019 08:00 AM    MCH 32.5 07/28/2011 09:02 AM    MCHC 33.1 (L) 08/19/2019 08:00 AM    MPV 11.1 08/19/2019 08:00 AM    NEUTSPOLYS 51.10 08/19/2019 08:00 AM    LYMPHOCYTES 32.60 08/19/2019 08:00 AM    MONOCYTES 11.50 08/19/2019 08:00 AM    EOSINOPHILS 2.90 08/19/2019 08:00 AM    BASOPHILS 1.60 08/19/2019 08:00 AM              Assessment and Plan. The following treatment and  monitoring plan is recommended:    1. Medicare annual wellness visit, subsequent.  The following diagnoses are all addressed.    2. SVT (supraventricular tachycardia) (HCC).  Status post ablation.  She is doing well with no symptoms.    3. Rosacea.  She continues with present medications and follow-up with dermatology.    4. Osteopenia of both hips.  She will continue with calcium, diet and exercise continue vitamin D.    5. Essential hypertension.  Well-controlled.  Continue same medications    6. Hiatal hernia.  Continue Zantac 150 twice daily.    7. Gastroesophageal reflux disease without esophagitis.  Continue Zantac 150 twice daily.    8.  Neutropenia.  Recheck CBC in 6 to 8 weeks.  9. Healthcare Maintenance. Counseled re: nutrition, activity and safety. Reviewed immunizations.         Services needed: No services needed at this time  Health Care Screening recommendations as per orders if indicated.  Referrals offered: PT/OT/Nutrition counseling/Behavioral Health/Smoking cessation as per orders if indicated.    Discussion today about general wellness and lifestyle habits:    · Prevent falls and reduce trip hazards; Cautioned about securing or removing rugs.  · Have a working fire alarm and carbon monoxide detector;   · Engage in regular physical activity and social activities   ·       Follow-up: 6-8 weeks for repeat CBC.

## 2019-09-24 ENCOUNTER — PATIENT MESSAGE (OUTPATIENT)
Dept: INTERNAL MEDICINE | Facility: IMAGING CENTER | Age: 74
End: 2019-09-24

## 2019-09-24 RX ORDER — IRBESARTAN 75 MG/1
75 TABLET ORAL DAILY
Qty: 90 TAB | Refills: 3 | Status: SHIPPED | OUTPATIENT
Start: 2019-09-24 | End: 2020-09-01 | Stop reason: SDUPTHER

## 2019-09-24 NOTE — TELEPHONE ENCOUNTER
From: Heriberto Nichols  To: Alona Pino M.D.  Sent: 9/24/2019 8:56 AM PDT  Subject: Prescription Question    Joe Sage,    1) More recalls on Losartan. Should I change to another drug for blood pressure? 2) FYI Interestingly Zantac recalled also for containing potential cancer-causing ingredient. So Dr. Pearson changed me to Pepcid for acid reflux. I'll give it time to see if it helps with raspy voice. 3) Sunny and I are going to travel again out of the country. I think I had measles when I was in 2nd grade. No one alive to confirm that. Should I get MMR just to make sure I don't become a carrier? Think that's all. It's enough! Stay well.  Jaun

## 2019-10-08 DIAGNOSIS — Z78.0 POST-MENOPAUSE: ICD-10-CM

## 2019-10-15 ENCOUNTER — NON-PROVIDER VISIT (OUTPATIENT)
Dept: INTERNAL MEDICINE | Facility: IMAGING CENTER | Age: 74
End: 2019-10-15
Payer: MEDICARE

## 2019-10-15 DIAGNOSIS — J30.2 SEASONAL ALLERGIES: ICD-10-CM

## 2019-10-15 PROCEDURE — 96372 THER/PROPH/DIAG INJ SC/IM: CPT | Performed by: FAMILY MEDICINE

## 2019-10-15 RX ORDER — TRIAMCINOLONE ACETONIDE 40 MG/ML
40 INJECTION, SUSPENSION INTRA-ARTICULAR; INTRAMUSCULAR ONCE
Status: COMPLETED | OUTPATIENT
Start: 2019-10-15 | End: 2019-10-15

## 2019-10-15 RX ADMIN — TRIAMCINOLONE ACETONIDE 40 MG: 40 INJECTION, SUSPENSION INTRA-ARTICULAR; INTRAMUSCULAR at 09:00

## 2019-11-12 ENCOUNTER — TELEPHONE (OUTPATIENT)
Dept: INTERNAL MEDICINE | Facility: IMAGING CENTER | Age: 74
End: 2019-11-12

## 2019-11-12 DIAGNOSIS — R92.30 DENSE BREAST TISSUE: ICD-10-CM

## 2019-11-12 NOTE — TELEPHONE ENCOUNTER
Discussed mammogram which is normal.  She does have dense breasts and therefore I am recommending an ultrasound.    She is also having ongoing reflux issues which primarily present is a scratchy throat and hoarseness.  She has been taking Pepcid and we will have her discontinue that and move up to Prilosec daily and follow-up in 1 month.

## 2020-02-12 ENCOUNTER — HOSPITAL ENCOUNTER (EMERGENCY)
Facility: MEDICAL CENTER | Age: 75
End: 2020-02-12
Attending: EMERGENCY MEDICINE
Payer: MEDICARE

## 2020-02-12 VITALS
TEMPERATURE: 97.3 F | HEART RATE: 67 BPM | DIASTOLIC BLOOD PRESSURE: 78 MMHG | HEIGHT: 66 IN | OXYGEN SATURATION: 99 % | BODY MASS INDEX: 20.89 KG/M2 | SYSTOLIC BLOOD PRESSURE: 151 MMHG | WEIGHT: 130 LBS | RESPIRATION RATE: 18 BRPM

## 2020-02-12 DIAGNOSIS — F41.0 PANIC ATTACK: ICD-10-CM

## 2020-02-12 DIAGNOSIS — R00.2 PALPITATIONS: ICD-10-CM

## 2020-02-12 LAB
ALBUMIN SERPL BCP-MCNC: 4.1 G/DL (ref 3.2–4.9)
ALBUMIN/GLOB SERPL: 1.3 G/DL
ALP SERPL-CCNC: 43 U/L (ref 30–99)
ALT SERPL-CCNC: 24 U/L (ref 2–50)
ANION GAP SERPL CALC-SCNC: 12 MMOL/L (ref 0–11.9)
AST SERPL-CCNC: 36 U/L (ref 12–45)
BASOPHILS # BLD AUTO: 0.8 % (ref 0–1.8)
BASOPHILS # BLD: 0.04 K/UL (ref 0–0.12)
BILIRUB SERPL-MCNC: 1.4 MG/DL (ref 0.1–1.5)
BUN SERPL-MCNC: 15 MG/DL (ref 8–22)
CALCIUM SERPL-MCNC: 9.8 MG/DL (ref 8.5–10.5)
CHLORIDE SERPL-SCNC: 106 MMOL/L (ref 96–112)
CO2 SERPL-SCNC: 21 MMOL/L (ref 20–33)
CREAT SERPL-MCNC: 1.01 MG/DL (ref 0.5–1.4)
EKG IMPRESSION: NORMAL
EOSINOPHIL # BLD AUTO: 0.11 K/UL (ref 0–0.51)
EOSINOPHIL NFR BLD: 2.1 % (ref 0–6.9)
ERYTHROCYTE [DISTWIDTH] IN BLOOD BY AUTOMATED COUNT: 41.3 FL (ref 35.9–50)
GLOBULIN SER CALC-MCNC: 3.1 G/DL (ref 1.9–3.5)
GLUCOSE SERPL-MCNC: 95 MG/DL (ref 65–99)
HCT VFR BLD AUTO: 42.8 % (ref 37–47)
HGB BLD-MCNC: 15.4 G/DL (ref 12–16)
IMM GRANULOCYTES # BLD AUTO: 0.01 K/UL (ref 0–0.11)
IMM GRANULOCYTES NFR BLD AUTO: 0.2 % (ref 0–0.9)
LYMPHOCYTES # BLD AUTO: 1.52 K/UL (ref 1–4.8)
LYMPHOCYTES NFR BLD: 28.6 % (ref 22–41)
MAGNESIUM SERPL-MCNC: 2.2 MG/DL (ref 1.5–2.5)
MCH RBC QN AUTO: 33.8 PG (ref 27–33)
MCHC RBC AUTO-ENTMCNC: 36 G/DL (ref 33.6–35)
MCV RBC AUTO: 94.1 FL (ref 81.4–97.8)
MONOCYTES # BLD AUTO: 0.5 K/UL (ref 0–0.85)
MONOCYTES NFR BLD AUTO: 9.4 % (ref 0–13.4)
NEUTROPHILS # BLD AUTO: 3.13 K/UL (ref 2–7.15)
NEUTROPHILS NFR BLD: 58.9 % (ref 44–72)
NRBC # BLD AUTO: 0 K/UL
NRBC BLD-RTO: 0 /100 WBC
PHOSPHATE SERPL-MCNC: 2.2 MG/DL (ref 2.5–4.5)
PLATELET # BLD AUTO: 232 K/UL (ref 164–446)
PMV BLD AUTO: 11 FL (ref 9–12.9)
POTASSIUM SERPL-SCNC: 4.2 MMOL/L (ref 3.6–5.5)
PROT SERPL-MCNC: 7.2 G/DL (ref 6–8.2)
RBC # BLD AUTO: 4.55 M/UL (ref 4.2–5.4)
SODIUM SERPL-SCNC: 139 MMOL/L (ref 135–145)
WBC # BLD AUTO: 5.3 K/UL (ref 4.8–10.8)

## 2020-02-12 PROCEDURE — 84100 ASSAY OF PHOSPHORUS: CPT

## 2020-02-12 PROCEDURE — 93005 ELECTROCARDIOGRAM TRACING: CPT | Performed by: EMERGENCY MEDICINE

## 2020-02-12 PROCEDURE — 85025 COMPLETE CBC W/AUTO DIFF WBC: CPT

## 2020-02-12 PROCEDURE — 80053 COMPREHEN METABOLIC PANEL: CPT

## 2020-02-12 PROCEDURE — 99284 EMERGENCY DEPT VISIT MOD MDM: CPT

## 2020-02-12 PROCEDURE — 83735 ASSAY OF MAGNESIUM: CPT

## 2020-02-12 NOTE — ED TRIAGE NOTES
Pt BIBA from home w/c/o dizziness, palpitations onset 0830. Pt reporting recent stress and anxiety. HX of SVT w/ablation approx 3 years ago. Per EMS pt 's upon arrival. Received 1mg versed IV. Pt arrives calm cooperative, denies CP, denies dizziness at time of arrival.

## 2020-02-12 NOTE — ED PROVIDER NOTES
"ED Provider Note    Scribed for Oscar Hickman D.O. by Rebeca Lynn. 2/12/2020  9:56 AM    Primary care provider: Alona Pino M.D.  Means of arrival: EMS  History obtained from: Patient  History limited by: None    CHIEF COMPLAINT  Chief Complaint   Patient presents with   • Palpitations   • Dizziness   • Anxiety       HPI  Heriberto Nichols is a 74 y.o. female with a history of SVT who presents to the Emergency Department via EMS for intermittent dizziness and palpitations onset 8:30 AM today.  She states she has had several of these episodes recently, but today's episode was more intense, prompting her to come in. Patient reports she has had increased stress and anxiety recently. She states her symptoms only last about 15 seconds at a time, and when they occur she, \"knows something is going wrong.\" She denies any loss of consciousness  The patient takes her normal regimen of Metoprolol. Patient reports she normally drinks 2 cups of coffee every morning.     REVIEW OF SYSTEMS  Pertinent positives include palpitations, dizziness, and anxiety. Pertinent negatives include no loss of consciousness.  All other systems reviewed and negative.    PAST MEDICAL HISTORY  Past Medical History:   Diagnosis Date   • Allergy    • Benign microscopic hematuria 2/9/2017   • DDD (degenerative disc disease), lumbar 5/4/2015   • Glaucoma    • Hiatal hernia    • IBS (irritable bowel syndrome)    • Osteopenia    • PAC (premature atrial contraction)    • Rosacea    • SVT (supraventricular tachycardia) ([facility]) 2/9/2015   • Ulcer 2004       SURGICAL HISTORY  Past Surgical History:   Procedure Laterality Date   • COLONOSCOPY  2003    recheck 10 years   • ABDOMINAL HYSTERECTOMY TOTAL  1987   • BUNIONECTOMY      x2        SOCIAL HISTORY  Social History     Tobacco Use   • Smoking status: Never Smoker   • Smokeless tobacco: Never Used   Substance Use Topics   • Alcohol use: Yes     Comment: rare   • Drug use: No      Social History " "    Substance and Sexual Activity   Drug Use No       FAMILY HISTORY  Family History   Problem Relation Age of Onset   • Cancer Mother         uterine   • Hypertension Mother    • Heart Disease Mother         arrythmia   • Cancer Father    • Cancer Maternal Aunt         breast       CURRENT MEDICATIONS  Home Medications     Reviewed by Claire Mistry R.N. (Registered Nurse) on 02/12/20 at 0947  Med List Status: Partial   Medication Last Dose Status   AVENOVA 0.01 % Solution  Active   Calcium Carbonate-Vitamin D (CALCIUM + D PO)  Active   estradiol (ESTRACE) 0.5 MG tablet  Active   FINACEA 15 % Gel  Active   fluticasone (FLONASE) 50 MCG/ACT nasal spray  Active   hyoscyamine (LEVSIN) 0.125 MG tablet  Active   irbesartan (AVAPRO) 75 MG tablet  Active   losartan (COZAAR) 50 MG Tab  Active   LUTEIN PO  Active   metoprolol (LOPRESSOR) 25 MG Tab  Active   metronidazole (METROLOTION) 0.75 % Lotion  Active   Omega-3 Fatty Acids (FISH OIL) 1000 MG CAPS capsule  Active   PATADAY 0.2 % Solution  Active   raNITidine (ZANTAC) 150 MG Tab  Active   SOOLANTRA 1 % Cream  Active   TRAVATAN Z 0.004 % Solution  Active                ALLERGIES  Allergies   Allergen Reactions   • Asa [Aspirin] Vomiting   • Shellfish Allergy Vomiting     diarrhea       PHYSICAL EXAM  VITAL SIGNS: /73   Pulse 69   Temp 36.3 °C (97.3 °F) (Temporal)   Resp 18   Ht 1.676 m (5' 6\")   Wt 59 kg (130 lb)   SpO2 100%   BMI 20.98 kg/m²     Nursing notes and vitals reviewed.  Constitutional: Well developed, Well nourished, No acute distress, Non-toxic appearance.   Eyes: PERRLA, EOMI, Conjunctiva normal, No discharge.   Cardiovascular: Normal heart rate, Normal rhythm, No murmurs, No rubs, No gallops.   Thorax & Lungs: No respiratory distress, No rales, No rhonchi, No wheezing, No chest tenderness.   Abdomen: Bowel sounds normal, Soft, No tenderness, No guarding, No rebound, No masses, No pulsatile masses.   Skin: Warm, Dry, No erythema, No rash. "   Musculoskeletal: Intact distal pulses, No edema, No cyanosis, No clubbing. Good range of motion in all major joints. No tenderness to palpation or major deformities noted, no CVA tenderness, no midline back tenderness.   Neurologic: Alert & oriented x 3, Normal motor function, Normal sensory function, No focal deficits noted.  Psychiatric: Anxious, affect normal for clinical presentation.    DIAGNOSTIC STUDIES/PROCEDURES    LABS  Results for orders placed or performed during the hospital encounter of 02/12/20   CBC WITH DIFFERENTIAL   Result Value Ref Range    WBC 5.3 4.8 - 10.8 K/uL    RBC 4.55 4.20 - 5.40 M/uL    Hemoglobin 15.4 12.0 - 16.0 g/dL    Hematocrit 42.8 37.0 - 47.0 %    MCV 94.1 81.4 - 97.8 fL    MCH 33.8 (H) 27.0 - 33.0 pg    MCHC 36.0 (H) 33.6 - 35.0 g/dL    RDW 41.3 35.9 - 50.0 fL    Platelet Count 232 164 - 446 K/uL    MPV 11.0 9.0 - 12.9 fL    Neutrophils-Polys 58.90 44.00 - 72.00 %    Lymphocytes 28.60 22.00 - 41.00 %    Monocytes 9.40 0.00 - 13.40 %    Eosinophils 2.10 0.00 - 6.90 %    Basophils 0.80 0.00 - 1.80 %    Immature Granulocytes 0.20 0.00 - 0.90 %    Nucleated RBC 0.00 /100 WBC    Neutrophils (Absolute) 3.13 2.00 - 7.15 K/uL    Lymphs (Absolute) 1.52 1.00 - 4.80 K/uL    Monos (Absolute) 0.50 0.00 - 0.85 K/uL    Eos (Absolute) 0.11 0.00 - 0.51 K/uL    Baso (Absolute) 0.04 0.00 - 0.12 K/uL    Immature Granulocytes (abs) 0.01 0.00 - 0.11 K/uL    NRBC (Absolute) 0.00 K/uL   MAGNESIUM   Result Value Ref Range    Magnesium 2.2 1.5 - 2.5 mg/dL   PHOSPHORUS   Result Value Ref Range    Phosphorus 2.2 (L) 2.5 - 4.5 mg/dL   Comp Metabolic Panel   Result Value Ref Range    Sodium 139 135 - 145 mmol/L    Potassium 4.2 3.6 - 5.5 mmol/L    Chloride 106 96 - 112 mmol/L    Co2 21 20 - 33 mmol/L    Anion Gap 12.0 (H) 0.0 - 11.9    Glucose 95 65 - 99 mg/dL    Bun 15 8 - 22 mg/dL    Creatinine 1.01 0.50 - 1.40 mg/dL    Calcium 9.8 8.5 - 10.5 mg/dL    AST(SGOT) 36 12 - 45 U/L    ALT(SGPT) 24 2 - 50 U/L     Alkaline Phosphatase 43 30 - 99 U/L    Total Bilirubin 1.4 0.1 - 1.5 mg/dL    Albumin 4.1 3.2 - 4.9 g/dL    Total Protein 7.2 6.0 - 8.2 g/dL    Globulin 3.1 1.9 - 3.5 g/dL    A-G Ratio 1.3 g/dL   ESTIMATED GFR   Result Value Ref Range    GFR If African American >60 >60 mL/min/1.73 m 2    GFR If Non  53 (A) >60 mL/min/1.73 m 2   EKG   Result Value Ref Range    Report       [facility] Emergency Dept.    Test Date:  2020  Pt Name:    ZACHARY GLASS                 Department: ER  MRN:        [id number]                      Room:       [facility]  Gender:     Female                       Technician: [id number]  :        [id number]                   Requested By:ER TRIAGE PROTOCOL  Order #:    [id number]                    Reading MD:    Measurements  Intervals                                Axis  Rate:       64                           P:          80  AR:         202                          QRS:        66  QRSD:       85                           T:          68  QT:         408  QTc:        421    Interpretive Statements  Sinus rhythm  LAE, consider biatrial enlargement  Anteroseptal infarct, age indeterminate  Compared to ECG 2017 09:49:03  Myocardial infarct finding now present  T-wave abnormality no longer present       All labs reviewed by me.    COURSE & MEDICAL DECISION MAKING  Pertinent Labs & Imaging studies reviewed. (See chart for details)    9:56 AM - Patient seen and examined at bedside.  This is a charming 74 y.o. female that presents with palpitations and probably anxiety attack.  She did have a SVT in the past and states that she might have that today.  Per EMS, her rate was under 20 bpm and she was exquisitely anxious.  They gave her Versed and immediately her heart rate did drop down to her normal rate and normal sinus rhythm.  Here in the emergency department, EKG shows no significant abnormality, she has notes of significant ectopy, she was monitored for least 2  hours and had no evidence of SVT or arrhythmia.  She has no syncope electrolyte abnormality, she is anemic, she does not have evidence of infection.  I do not suspect ST elevation myocardial infarction non-ST elevation microinfarction, acute coronary syndrome.  The patient is anxious and states that she is grieving over her loss of her mother and her dogs and believe that is probably the etiology.  Have asked the patient to follow-up with cardiology, Dr. antonio as well as her primary care physician for further evaluation and management probably Holter monitor.  The patient has no evidence of endorgan damage, she is thankful, she is acting appropriately is discharged in stable condition.      DISPOSITION:  Patient will be discharged home in stable condition.    FOLLOW UP:  [facility], Emergency Dept  [address]  [facility] [id number] [phone]    If symptoms worsen    Alona Pino M.D.  [address]  [address]  [facility] [id number] [phone]    Schedule an appointment as soon as possible for a visit       Kayli Menard M.D.  [address]  [address]  [facility] [id number] [phone]    Schedule an appointment as soon as possible for a visit         OUTPATIENT MEDICATIONS:  Discharge Medication List as of 2/12/2020  1:33 PM            FINAL IMPRESSION  1. Palpitations Active   2. Panic attack Active         Rebeca BOB (Scribe), am scribing for, and in the presence of, Oscar Hickman D.O    Electronically signed by: Rebeca Lynn (Boogieibe), 2/12/2020    Oscar BOB D.O. personally performed the services described in this documentation, as scribed by Rebeca Lynn in my presence, and it is both accurate and complete. C.    The note accurately reflects work and decisions made by me.  Oscar Hickman D.O.  2/12/2020  2:07 PM

## 2020-02-13 ENCOUNTER — OFFICE VISIT (OUTPATIENT)
Dept: INTERNAL MEDICINE | Facility: IMAGING CENTER | Age: 75
End: 2020-02-13
Payer: MEDICARE

## 2020-02-13 VITALS
WEIGHT: 139 LBS | HEIGHT: 65 IN | BODY MASS INDEX: 23.16 KG/M2 | RESPIRATION RATE: 14 BRPM | OXYGEN SATURATION: 97 % | SYSTOLIC BLOOD PRESSURE: 110 MMHG | TEMPERATURE: 98.2 F | DIASTOLIC BLOOD PRESSURE: 65 MMHG | HEART RATE: 70 BPM

## 2020-02-13 DIAGNOSIS — I10 ESSENTIAL HYPERTENSION: ICD-10-CM

## 2020-02-13 DIAGNOSIS — R00.2 PALPITATIONS: ICD-10-CM

## 2020-02-13 DIAGNOSIS — F43.9 STRESS: ICD-10-CM

## 2020-02-13 DIAGNOSIS — I47.10 SVT (SUPRAVENTRICULAR TACHYCARDIA) (HCC): ICD-10-CM

## 2020-02-13 PROCEDURE — 99214 OFFICE O/P EST MOD 30 MIN: CPT | Performed by: FAMILY MEDICINE

## 2020-02-13 RX ORDER — OMEPRAZOLE 20 MG/1
CAPSULE, DELAYED RELEASE ORAL DAILY
COMMUNITY
Start: 2019-12-15 | End: 2020-03-09 | Stop reason: SDUPTHER

## 2020-02-13 ASSESSMENT — PATIENT HEALTH QUESTIONNAIRE - PHQ9: CLINICAL INTERPRETATION OF PHQ2 SCORE: 0

## 2020-02-17 NOTE — PROGRESS NOTES
Chief Complaint   Patient presents with   • Hospital Follow-up     tachycardia       HISTORY OF PRESENT ILLNESS: Patient is a 74 y.o. female established patient who presents today to follow-up a recent ER visit.  She presented to the emergency room yesterday.  She felt lightheaded and was feeling some palpitations.  She does have a history of SVT, status post ablation in 2016 with Dr. Rdz..  She did not experience any chest pain.  She did not experience any sustained tachycardia, and has not since her ablation.    She does normally drink 1 to 2 cups of coffee in the morning.  No other stimulants.  She has had a very stressful year with personal losses.    She was monitored and discharged.    She is feeling much better today.  She does describe some occasional dizziness in the past.  This is often with position changes.  She continues on metoprolol and irbesartan.        Patient Active Problem List    Diagnosis Date Noted   • SVT (supraventricular tachycardia) ([facility]) 02/09/2015     Priority: High   • Gastroesophageal reflux disease without esophagitis 08/22/2019   • Benign microscopic hematuria 02/09/2017   • Cervical stenosis of spinal canal 08/08/2016   • DDD (degenerative disc disease), cervical 07/01/2015   • DDD (degenerative disc disease), lumbar 05/04/2015   • History of PUD (peptic ulcer disease) 02/07/2015   • Hypertension 06/18/2013   • Allergic state    • Rosacea    • IBS (irritable bowel syndrome)    • Osteopenia    • Hiatal hernia      Current Outpatient Medications on File Prior to Visit   Medication Sig Dispense Refill   • omeprazole (PRILOSEC) 20 MG delayed-release capsule Take  by mouth every day. Take 1 capsule by mouth every day     • metoprolol (LOPRESSOR) 25 MG Tab Take 1 Tab by mouth 2 times a day. 180 Tab 3   • irbesartan (AVAPRO) 75 MG tablet Take 1 Tab by mouth every day. 90 Tab 3   • estradiol (ESTRACE) 0.5 MG tablet TAKE 1 TABLET BY MOUTH DAILY 90 Tab 3   • hyoscyamine (LEVSIN) 0.125 MG  tablet Take 1 Tab by mouth every 6 hours as needed for Cramping. 30 Tab 1   • fluticasone (FLONASE) 50 MCG/ACT nasal spray USE 2 SPRAYS IN EACH NOSTRIL DAILY 16 Bottle 2   • SOOLANTRA 1 % Cream APPLY TO THE AFFECTED AREA ONCE D  4   • FINACEA 15 % Gel 1 APPLICATION APPLY ON THE SKIN TWICE A DAY  6   • TRAVATAN Z 0.004 % Solution INSTILL 1 GTT IN OU HS  3   • AVENOVA 0.01 % Solution APPLY 1ML TO EYELIDS DAILY  3   • metronidazole (METROLOTION) 0.75 % Lotion 1 APPLICATION APPLY ON THE SKIN TWICE A DAY  6   • PATADAY 0.2 % Solution INSTILL 1 DROP IN BOTH EYES DAILY  4   • LUTEIN PO Take 1 Tab by mouth every day.     • Calcium Carbonate-Vitamin D (CALCIUM + D PO) Take 1 Tab by mouth every day.     • Omega-3 Fatty Acids (FISH OIL) 1000 MG CAPS capsule Take 1,000 mg by mouth every day.       No current facility-administered medications on file prior to visit.          Past medical, surgical, family, and social history is reviewed and updated in Epic chart by me today.   Medications and allergies reviewed and updated in Epic chart by me today.     REVIEW OF SYSTEMS:  GENERAL: No fatigue, no weight loss.  HEENT:  Ears--no earache, no change in hearing, no dizziness, no tinnitus.                 Eyes--no blurred vision, no discharge or pain                 Throat--No sore throat, no dysphagia, no hoarseness  CV:  No chest pain,dyspnea,or edema.  Palpitations as above.  RESP:  No sob,cough,wheezing or hemoptysis.  GI: No dysphagia, heartburn,abdominal pain, nausea, vomiting, diarrhea or constipation.       No melena, jaundice, bleeding, incontinence or change in bowel habits.  :  No dysuria, polyuria, hematuria, incontinence, or nocturia.  MS: Chronic neck and back pain  NEURO:  No seizures, syncope, paralysis, tremor, or weakness.  SKIN: No new or concerning skin lesions or changes.   PSYCH: Mood fine.    Vitals:    02/13/20 1300   BP: 110/65   Pulse: 70   Resp: 14   Temp: 36.8 °C (98.2 °F)   TempSrc: Temporal   SpO2: 97%  "  Weight: 63 kg (139 lb)   Height: 1.651 m (5' 5\")     Physical Exam:  Gen: Well developed, well nourished. No acute distress.  Neck:  Supple, no adenopathy or thyromegaly.  Heart:  Regular rate and rhythm.  Normal S1, S2. No murmur, gallop or rub.  Lungs:  Clear, No wheezes,rales or rhonchi.  Extremities:  No edema.  Psych: Mood and affect are appropriate.    Emergency room records and labs are reviewed.      Assessment/Plan:  1. Palpitations.  She is feeling better today.  Encouraged adequate hydration.  Adequate rest.  Avoid stimulant.  She has not seen cardiology for 2 years and will refer her for a consult.    2. SVT (supraventricular tachycardia) (HCC)  REFERRAL TO CARDIAC ELECTROPHYSIOLOGY   3. Essential hypertension.  Controlled on present medications.    4. Stress.  Counseled.  She is encouraged to call for any concerns.         "

## 2020-02-24 DIAGNOSIS — K58.9 IRRITABLE BOWEL SYNDROME, UNSPECIFIED TYPE: ICD-10-CM

## 2020-02-24 DIAGNOSIS — T78.40XD ALLERGIC STATE, SUBSEQUENT ENCOUNTER: ICD-10-CM

## 2020-02-24 RX ORDER — HYOSCYAMINE SULFATE 0.125 MG
125 TABLET ORAL EVERY 6 HOURS PRN
Qty: 30 TAB | Refills: 1 | Status: SHIPPED | OUTPATIENT
Start: 2020-02-24 | End: 2021-11-24 | Stop reason: SDUPTHER

## 2020-02-24 RX ORDER — FLUTICASONE PROPIONATE 50 MCG
2 SPRAY, SUSPENSION (ML) NASAL DAILY
Qty: 16 BOTTLE | Refills: 2 | Status: SHIPPED | OUTPATIENT
Start: 2020-02-24 | End: 2021-07-13 | Stop reason: SDUPTHER

## 2020-03-09 RX ORDER — OMEPRAZOLE 20 MG/1
20 CAPSULE, DELAYED RELEASE ORAL DAILY
Qty: 90 CAP | Refills: 3 | Status: SHIPPED | OUTPATIENT
Start: 2020-03-09 | End: 2021-05-11 | Stop reason: SDUPTHER

## 2020-03-17 ENCOUNTER — OFFICE VISIT (OUTPATIENT)
Dept: CARDIOLOGY | Facility: MEDICAL CENTER | Age: 75
End: 2020-03-17
Payer: MEDICARE

## 2020-03-17 VITALS
BODY MASS INDEX: 22.99 KG/M2 | SYSTOLIC BLOOD PRESSURE: 112 MMHG | OXYGEN SATURATION: 96 % | DIASTOLIC BLOOD PRESSURE: 72 MMHG | HEIGHT: 65 IN | WEIGHT: 138 LBS | HEART RATE: 84 BPM

## 2020-03-17 DIAGNOSIS — I47.10 SVT (SUPRAVENTRICULAR TACHYCARDIA) (HCC): ICD-10-CM

## 2020-03-17 DIAGNOSIS — I10 ESSENTIAL HYPERTENSION: ICD-10-CM

## 2020-03-17 LAB — EKG IMPRESSION: NORMAL

## 2020-03-17 PROCEDURE — 99214 OFFICE O/P EST MOD 30 MIN: CPT | Performed by: INTERNAL MEDICINE

## 2020-03-17 PROCEDURE — 93000 ELECTROCARDIOGRAM COMPLETE: CPT | Performed by: INTERNAL MEDICINE

## 2020-03-17 ASSESSMENT — FIBROSIS 4 INDEX: FIB4 SCORE: 2.41

## 2020-03-17 NOTE — PROGRESS NOTES
Arrhythmia Clinic Note (New Patient)    DOS: 3/17/2020    Referring physician: Alona Crowell MD    Chief complaint/Reason for consult: SVT    HPI:  Patient is a 75 yo F. She has a history of SVT, specifically atrial tachycardia, s/p remote ablation with Jamir Rdz. Mapped to high tone, multiple foci and at end of procedure still somewhat inducible mapping to areas with phrenic stimulation. Felt much better after ablation. Recently with what was described as panic attack. Symptomatic PACs seen by EMS. No evidence of sustained arrhythmia when she arrived in ED. Has felt fine since. Remains on small dose of BB.    ROS (+ in BOLD):  Constitutional: Fevers/chills/fatigue/weightloss  HEENT: Blurry vision/eye pain/sore throat/hearing loss/intolerance to light/tinnitus  Respiratory: Shortness of breath/cough/allergies  Cardiovascular: Chest pain/palpitations/edema/orthopnea/syncope  GI: Nausea/vomitting/diarrhea/dyspepsia  MSK: Arthralgias/myagias/muscle weakness  Skin: Rash/sores  Neurological: Numbness/tremors/vertigo  Endocrine: Excessive thirst/polyuria/cold intolerance/heat intolerance  Psych: Depression/anxiety      Past Medical History:   Diagnosis Date   • Allergy    • Benign microscopic hematuria 2/9/2017   • DDD (degenerative disc disease), lumbar 5/4/2015   • Glaucoma    • Hiatal hernia    • IBS (irritable bowel syndrome)    • Osteopenia    • PAC (premature atrial contraction)    • Rosacea    • SVT (supraventricular tachycardia) ([facility]) 2/9/2015   • Ulcer 2004       Past Surgical History:   Procedure Laterality Date   • COLONOSCOPY  2003    recheck 10 years   • ABDOMINAL HYSTERECTOMY TOTAL  1987   • BUNIONECTOMY      x2       Social History     Socioeconomic History   • Marital status:      Spouse name: Not on file   • Number of children: Not on file   • Years of education: Not on file   • Highest education level: Not on file   Occupational History   • Not on file   Social Needs   • Financial resource  strain: Not on file   • Food insecurity     Worry: Not on file     Inability: Not on file   • Transportation needs     Medical: Not on file     Non-medical: Not on file   Tobacco Use   • Smoking status: Never Smoker   • Smokeless tobacco: Never Used   Substance and Sexual Activity   • Alcohol use: Yes     Comment: rare   • Drug use: No   • Sexual activity: Not on file     Comment: , 1child   Lifestyle   • Physical activity     Days per week: Not on file     Minutes per session: Not on file   • Stress: Not on file   Relationships   • Social connections     Talks on phone: Not on file     Gets together: Not on file     Attends Restorationist service: Not on file     Active member of club or organization: Not on file     Attends meetings of clubs or organizations: Not on file     Relationship status: Not on file   • Intimate partner violence     Fear of current or ex partner: Not on file     Emotionally abused: Not on file     Physically abused: Not on file     Forced sexual activity: Not on file   Other Topics Concern   • Not on file   Social History Narrative   • Not on file       Family History   Problem Relation Age of Onset   • Cancer Mother         uterine   • Hypertension Mother    • Heart Disease Mother         arrythmia   • Cancer Father    • Cancer Maternal Aunt         breast       Allergies   Allergen Reactions   • Asa [Aspirin] Vomiting   • Shellfish Allergy Vomiting     diarrhea       Current Outpatient Medications   Medication Sig Dispense Refill   • omeprazole (PRILOSEC) 20 MG delayed-release capsule Take 1 Cap by mouth every day. Take 1 capsule by mouth every day 90 Cap 3   • fluticasone (FLONASE) 50 MCG/ACT nasal spray Spray 2 Sprays in nose every day. 16 Bottle 2   • hyoscyamine (LEVSIN) 0.125 MG tablet Take 1 Tab by mouth every 6 hours as needed for Cramping. 30 Tab 1   • metoprolol (LOPRESSOR) 25 MG Tab Take 1 Tab by mouth 2 times a day. 180 Tab 3   • irbesartan (AVAPRO) 75 MG tablet Take 1 Tab  "by mouth every day. 90 Tab 3   • estradiol (ESTRACE) 0.5 MG tablet TAKE 1 TABLET BY MOUTH DAILY 90 Tab 3   • SOOLANTRA 1 % Cream APPLY TO THE AFFECTED AREA ONCE D  4   • FINACEA 15 % Gel 1 APPLICATION APPLY ON THE SKIN TWICE A DAY  6   • TRAVATAN Z 0.004 % Solution INSTILL 1 GTT IN OU HS  3   • AVENOVA 0.01 % Solution APPLY 1ML TO EYELIDS DAILY  3   • PATADAY 0.2 % Solution INSTILL 1 DROP IN BOTH EYES DAILY  4   • LUTEIN PO Take 1 Tab by mouth every day.     • Calcium Carbonate-Vitamin D (CALCIUM + D PO) Take 1 Tab by mouth every day.     • Omega-3 Fatty Acids (FISH OIL) 1000 MG CAPS capsule Take 1,000 mg by mouth every day.     • losartan (COZAAR) 50 MG Tab TAKE 1 TABLET BY MOUTH EVERY DAY (Patient not taking: Reported on 2/13/2020) 90 Tab 3   • metronidazole (METROLOTION) 0.75 % Lotion 1 APPLICATION APPLY ON THE SKIN TWICE A DAY  6     No current facility-administered medications for this visit.        Physical Exam:  Vitals:    03/17/20 0845   BP: 112/72   Pulse: 84   SpO2: 96%   Weight: 62.6 kg (138 lb)   Height: 1.651 m (5' 5\")     General appearance: NAD, conversant  Eyes: anicteric sclerae, no lid-lag; PERRLA  HENT: Atraumatic; moist mucous membranes, no ulcerations  Neck: Trachea midline; FROM, supple, no thyromegaly  Lungs: CTA, with normal respiratory effort and no intercostal retractions  CV: RRR, no MRGs, no JVD  Abdomen: Soft, non-tender; normal bowel sounds, no HSM  Extremities: No peripheral edema. No clubbing or cyanosis.  Skin: Normal temperature, turgor and texture; no rash or ulcers  Psych: Appropriate affect, alert and oriented to person, place and time      Data:  Labs reviewed    Prior echo/stress reviewed:  Normal nuc    EKG interpreted by me:  NSR    Impression/Plan:  1. SVT (supraventricular tachycardia) (HCC)  EKG   2. Essential hypertension       -I have reviewed with her in detail mechanism of her symptomatic PACs  -I have reassured her these were benign and that if she did not have " sustained symptoms, treatment is reassurance  -I think continue small dose of BB is okay  -Continue ARB for BP well controlled  -If she becomes more symptomatic, I think antiarrhythmics are an option though I would like to avoid these for now as risks << benefits  -F/u in 6 mo    Shalom Crockett MD

## 2020-06-22 DIAGNOSIS — Z78.0 MENOPAUSE: ICD-10-CM

## 2020-06-22 RX ORDER — ESTRADIOL 0.5 MG/1
TABLET ORAL
Qty: 90 TAB | Refills: 3 | Status: SHIPPED | OUTPATIENT
Start: 2020-06-22 | End: 2021-06-17 | Stop reason: SDUPTHER

## 2020-08-19 DIAGNOSIS — R00.2 PALPITATIONS: ICD-10-CM

## 2020-08-19 DIAGNOSIS — I10 ESSENTIAL HYPERTENSION: ICD-10-CM

## 2020-09-01 RX ORDER — IRBESARTAN 75 MG/1
75 TABLET ORAL DAILY
Qty: 90 TAB | Refills: 3 | Status: SHIPPED | OUTPATIENT
Start: 2020-09-01 | End: 2021-09-09 | Stop reason: SDUPTHER

## 2020-09-03 ENCOUNTER — NON-PROVIDER VISIT (OUTPATIENT)
Dept: INTERNAL MEDICINE | Facility: IMAGING CENTER | Age: 75
End: 2020-09-03
Payer: MEDICARE

## 2020-09-03 ENCOUNTER — HOSPITAL ENCOUNTER (OUTPATIENT)
Facility: MEDICAL CENTER | Age: 75
End: 2020-09-03
Attending: FAMILY MEDICINE
Payer: MEDICARE

## 2020-09-03 DIAGNOSIS — I10 ESSENTIAL HYPERTENSION: ICD-10-CM

## 2020-09-03 DIAGNOSIS — R00.2 PALPITATIONS: ICD-10-CM

## 2020-09-03 LAB
ALBUMIN SERPL BCP-MCNC: 4.1 G/DL (ref 3.2–4.9)
ALBUMIN/GLOB SERPL: 1.6 G/DL
ALP SERPL-CCNC: 49 U/L (ref 30–99)
ALT SERPL-CCNC: 16 U/L (ref 2–50)
ANION GAP SERPL CALC-SCNC: 12 MMOL/L (ref 7–16)
AST SERPL-CCNC: 19 U/L (ref 12–45)
BASOPHILS # BLD AUTO: 1.8 % (ref 0–1.8)
BASOPHILS # BLD: 0.06 K/UL (ref 0–0.12)
BILIRUB SERPL-MCNC: 1.4 MG/DL (ref 0.1–1.5)
BUN SERPL-MCNC: 15 MG/DL (ref 8–22)
CALCIUM SERPL-MCNC: 9.6 MG/DL (ref 8.5–10.5)
CHLORIDE SERPL-SCNC: 106 MMOL/L (ref 96–112)
CHOLEST SERPL-MCNC: 200 MG/DL (ref 100–199)
CO2 SERPL-SCNC: 22 MMOL/L (ref 20–33)
CREAT SERPL-MCNC: 0.87 MG/DL (ref 0.5–1.4)
EOSINOPHIL # BLD AUTO: 0.15 K/UL (ref 0–0.51)
EOSINOPHIL NFR BLD: 4.5 % (ref 0–6.9)
ERYTHROCYTE [DISTWIDTH] IN BLOOD BY AUTOMATED COUNT: 44.8 FL (ref 35.9–50)
GLOBULIN SER CALC-MCNC: 2.6 G/DL (ref 1.9–3.5)
GLUCOSE SERPL-MCNC: 95 MG/DL (ref 65–99)
HCT VFR BLD AUTO: 42.5 % (ref 37–47)
HDLC SERPL-MCNC: 86 MG/DL
HGB BLD-MCNC: 14.3 G/DL (ref 12–16)
IMM GRANULOCYTES # BLD AUTO: 0.01 K/UL (ref 0–0.11)
IMM GRANULOCYTES NFR BLD AUTO: 0.3 % (ref 0–0.9)
LDLC SERPL CALC-MCNC: 105 MG/DL
LYMPHOCYTES # BLD AUTO: 1.23 K/UL (ref 1–4.8)
LYMPHOCYTES NFR BLD: 36.7 % (ref 22–41)
MCH RBC QN AUTO: 32.1 PG (ref 27–33)
MCHC RBC AUTO-ENTMCNC: 33.6 G/DL (ref 33.6–35)
MCV RBC AUTO: 95.5 FL (ref 81.4–97.8)
MONOCYTES # BLD AUTO: 0.37 K/UL (ref 0–0.85)
MONOCYTES NFR BLD AUTO: 11 % (ref 0–13.4)
NEUTROPHILS # BLD AUTO: 1.53 K/UL (ref 2–7.15)
NEUTROPHILS NFR BLD: 45.7 % (ref 44–72)
NRBC # BLD AUTO: 0 K/UL
NRBC BLD-RTO: 0 /100 WBC
PLATELET # BLD AUTO: 176 K/UL (ref 164–446)
PMV BLD AUTO: 11.4 FL (ref 9–12.9)
POTASSIUM SERPL-SCNC: 4.1 MMOL/L (ref 3.6–5.5)
PROT SERPL-MCNC: 6.7 G/DL (ref 6–8.2)
RBC # BLD AUTO: 4.45 M/UL (ref 4.2–5.4)
SODIUM SERPL-SCNC: 140 MMOL/L (ref 135–145)
TRIGL SERPL-MCNC: 46 MG/DL (ref 0–149)
TSH SERPL DL<=0.005 MIU/L-ACNC: 3.23 UIU/ML (ref 0.38–5.33)
WBC # BLD AUTO: 3.4 K/UL (ref 4.8–10.8)

## 2020-09-03 PROCEDURE — 85025 COMPLETE CBC W/AUTO DIFF WBC: CPT

## 2020-09-03 PROCEDURE — 80053 COMPREHEN METABOLIC PANEL: CPT

## 2020-09-03 PROCEDURE — 84443 ASSAY THYROID STIM HORMONE: CPT

## 2020-09-03 PROCEDURE — 80061 LIPID PANEL: CPT

## 2020-09-10 ENCOUNTER — OFFICE VISIT (OUTPATIENT)
Dept: INTERNAL MEDICINE | Facility: IMAGING CENTER | Age: 75
End: 2020-09-10
Payer: MEDICARE

## 2020-09-10 VITALS
HEART RATE: 74 BPM | TEMPERATURE: 98 F | RESPIRATION RATE: 14 BRPM | DIASTOLIC BLOOD PRESSURE: 70 MMHG | BODY MASS INDEX: 22.99 KG/M2 | WEIGHT: 138 LBS | OXYGEN SATURATION: 97 % | HEIGHT: 65 IN | SYSTOLIC BLOOD PRESSURE: 124 MMHG

## 2020-09-10 DIAGNOSIS — M85.851 OSTEOPENIA OF BOTH HIPS: ICD-10-CM

## 2020-09-10 DIAGNOSIS — M85.852 OSTEOPENIA OF BOTH HIPS: ICD-10-CM

## 2020-09-10 DIAGNOSIS — M50.30 DDD (DEGENERATIVE DISC DISEASE), CERVICAL: ICD-10-CM

## 2020-09-10 DIAGNOSIS — I10 ESSENTIAL HYPERTENSION: ICD-10-CM

## 2020-09-10 DIAGNOSIS — M51.36 DDD (DEGENERATIVE DISC DISEASE), LUMBAR: ICD-10-CM

## 2020-09-10 DIAGNOSIS — Z00.00 MEDICARE ANNUAL WELLNESS VISIT, SUBSEQUENT: ICD-10-CM

## 2020-09-10 DIAGNOSIS — I47.10 SVT (SUPRAVENTRICULAR TACHYCARDIA) (HCC): ICD-10-CM

## 2020-09-10 PROCEDURE — G0439 PPPS, SUBSEQ VISIT: HCPCS | Performed by: FAMILY MEDICINE

## 2020-09-10 RX ORDER — A/SINGAPORE/GP1908/2015 IVR-180 (AN A/MICHIGAN/45/2015 (H1N1)PDM09-LIKE VIRUS, A/HONG KONG/4801/2014, NYMC X-263B (H3N2) (AN A/HONG KONG/4801/2014-LIKE VIRUS), AND B/BRISBANE/60/2008, WILD TYPE (A B/BRISBANE/60/2008-LIKE VIRUS) 15; 15; 15 UG/.5ML; UG/.5ML; UG/.5ML
0.5 INJECTION, SUSPENSION INTRAMUSCULAR
COMMUNITY
Start: 2020-09-05 | End: 2020-09-10

## 2020-09-10 ASSESSMENT — ENCOUNTER SYMPTOMS: GENERAL WELL-BEING: GOOD

## 2020-09-10 ASSESSMENT — ACTIVITIES OF DAILY LIVING (ADL): BATHING_REQUIRES_ASSISTANCE: 0

## 2020-09-10 ASSESSMENT — PATIENT HEALTH QUESTIONNAIRE - PHQ9: CLINICAL INTERPRETATION OF PHQ2 SCORE: 0

## 2020-09-10 ASSESSMENT — FIBROSIS 4 INDEX: FIB4 SCORE: 2.08

## 2020-09-13 NOTE — PROGRESS NOTES
Chief Complaint   Patient presents with   • Medicare Annual Wellness         HPI:  Heriberto is a 75 y.o. established female patient here for Medicare Annual Wellness Visit   She is generally doing well.  No major complaints.    She has a history of hypertension which is well controlled.  History of SVT, status post distant ablation.  No palpitations or tachycardias.  No chest pains.    She does have degenerative disc disease in her neck and back and does occasionally do some physical therapy.  She is trying to stay active.      Patient Active Problem List    Diagnosis Date Noted   • SVT (supraventricular tachycardia) ([facility]) 02/09/2015     Priority: High   • Gastroesophageal reflux disease without esophagitis 08/22/2019   • Benign microscopic hematuria 02/09/2017   • Cervical stenosis of spinal canal 08/08/2016   • DDD (degenerative disc disease), cervical 07/01/2015   • DDD (degenerative disc disease), lumbar 05/04/2015   • History of PUD (peptic ulcer disease) 02/07/2015   • Hypertension 06/18/2013   • Allergic state    • Rosacea    • IBS (irritable bowel syndrome)    • Osteopenia    • Hiatal hernia        Current Outpatient Medications   Medication Sig Dispense Refill   • irbesartan (AVAPRO) 75 MG tablet Take 1 Tab by mouth every day. 90 Tab 3   • estradiol (ESTRACE) 0.5 MG tablet TAKE 1 TABLET BY MOUTH DAILY 90 Tab 3   • omeprazole (PRILOSEC) 20 MG delayed-release capsule Take 1 Cap by mouth every day. Take 1 capsule by mouth every day 90 Cap 3   • fluticasone (FLONASE) 50 MCG/ACT nasal spray Spray 2 Sprays in nose every day. 16 Bottle 2   • hyoscyamine (LEVSIN) 0.125 MG tablet Take 1 Tab by mouth every 6 hours as needed for Cramping. 30 Tab 1   • metoprolol (LOPRESSOR) 25 MG Tab Take 1 Tab by mouth 2 times a day. 180 Tab 3   • SOOLANTRA 1 % Cream APPLY TO THE AFFECTED AREA ONCE D  4   • FINACEA 15 % Gel 1 APPLICATION APPLY ON THE SKIN TWICE A DAY  6   • TRAVATAN Z 0.004 % Solution INSTILL 1 GTT IN OU HS  3   •  metronidazole (METROLOTION) 0.75 % Lotion 1 APPLICATION APPLY ON THE SKIN TWICE A DAY  6   • PATADAY 0.2 % Solution INSTILL 1 DROP IN BOTH EYES DAILY  4   • LUTEIN PO Take 1 Tab by mouth every day.     • Calcium Carbonate-Vitamin D (CALCIUM + D PO) Take 1 Tab by mouth every day.     • Omega-3 Fatty Acids (FISH OIL) 1000 MG CAPS capsule Take 1,000 mg by mouth every day.       No current facility-administered medications for this visit.         She is compliant with medications  Current supplements as per medication list.   Chronic narcotic pain medicines: No    Allergies: Asa [aspirin] and Shellfish allergy    Current social contact/activities: Active with family and friends  Exercise: Yes  Is patient current with immunizations?  Yes      She  reports that she has never smoked. She has never used smokeless tobacco. She reports current alcohol use. She reports that she does not use drugs.        DPA/Advanced directive: She does have advanced directives.  I did ask her to bring in a copy    ROS:    Gait: Uses none  Ostomy: No  Other tubes: No  Amputations: No  Chronic oxygen use: No  Last eye exam: Within the past year  : Denies incontinence.       Screening:  Depression Screening    Little interest or pleasure in doing things?  0 - not at all  Feeling down, depressed , or hopeless? 0 - not at all  Patient Health Questionnaire Score: 0     If depressive symptoms identified deferred to follow up visit unless specifically addressed in assessment and plan.    Interpretation of PHQ-9 Total Score   Score Severity   1-4 No Depression   5-9 Mild Depression   10-14 Moderate Depression   15-19 Moderately Severe Depression   20-27 Severe Depression    Screening for Cognitive Impairment    Three Minute Recall (river, sabrina, finger) 2/3    Den clock face with all 12 numbers and set the hands to show 10 past 11.  Yes    Cognitive concerns identified deferred for follow up unless specifically addressed in assessment and  plan.    Fall Risk Assessment    Has the patient had two or more falls in the last year or any fall with injury in the last year?  No    Safety Assessment    Throw rugs on floor.  Yes  Handrails on all stairs.  Yes  Good lighting in all hallways.  Yes  Difficulty hearing.  Yes  Patient counseled about all safety risks that were identified.    Functional Assessment ADLs    Are there any barriers preventing you from cooking for yourself or meeting nutritional needs?  No.    Are there any barriers preventing you from driving safely or obtaining transportation?  No.    Are there any barriers preventing you from using a telephone or calling for help?  No.    Are there any barriers preventing you from shopping?  No.    Are there any barriers preventing you from taking care of your own finances?  No.    Are there any barriers preventing you from managing your medications?  No.    Are there any barriers preventing you from showering, bathing or dressing yourself?  No.    Are you currently engaging in any exercise or physical activity?  Yes.     What is your perception of your health?  Good.          Patient Care Team:  Alona Pino M.D. as PCP - General (Family Medicine)  Soco Arzola R.N. as Registered Nurse  GI:   [facility]  Optometry:  Dr. Loco  Physical Med: Dr. Knight  PT:  [facility]  Card:   [facility]  Derm:   Dr. Crump  ENT:  DR. Miller      Social History     Tobacco Use   • Smoking status: Never Smoker   • Smokeless tobacco: Never Used   Substance Use Topics   • Alcohol use: Yes     Comment: rare   • Drug use: No     Family History   Problem Relation Age of Onset   • Cancer Mother         uterine   • Hypertension Mother    • Heart Disease Mother         arrythmia   • Cancer Father    • Cancer Maternal Aunt         breast     She  has a past medical history of Allergy, Benign microscopic hematuria (2/9/2017), DDD (degenerative disc disease), lumbar (5/4/2015), Glaucoma, Hiatal hernia, IBS (irritable  "bowel syndrome), Osteopenia, PAC (premature atrial contraction), Rosacea, SVT (supraventricular tachycardia) (HCC) (2/9/2015), and Ulcer (2004).   Past Surgical History:   Procedure Laterality Date   • COLONOSCOPY  2003    recheck 10 years   • ABDOMINAL HYSTERECTOMY TOTAL  1987   • BUNIONECTOMY      x2       REVIEW OF SYSTEMS:  GENERAL: No fatigue, no weight loss.  HEENT:  Ears--no earache, no change in hearing, no dizziness, no tinnitus.                 Eyes--no blurred vision, no discharge or pain                 Throat--No sore throat, no dysphagia, no hoarseness  CV:  No chest pain,dyspnea,palpitations or edema.  RESP:  No sob,cough,wheezing or hemoptysis.  GI: No dysphagia, heartburn,abdominal pain, nausea, vomiting, diarrhea or constipation.       No melena, jaundice, bleeding, incontinence or change in bowel habits.  :  No dysuria, polyuria, hematuria, incontinence, or nocturia.  MS: Mild neck and low back pain.  NEURO:  No seizures, syncope, paralysis, tremor, or weakness.  SKIN: No new or concerning skin lesions or changes.   PSYCH: Mood fine.      Exam:     /70   Pulse 74   Temp 36.7 °C (98 °F) (Temporal)   Resp 14   Ht 1.651 m (5' 5\")   Wt 62.6 kg (138 lb)   SpO2 97%  Body mass index is 22.96 kg/m².    Hearing good  Dentition: Good  Alert, oriented in no acute distress.  Eye contact is good, speech goal directed, affect calm  PHYSICAL EXAM;  GENERAL;  WN/WD, No acute distress.   HEENT:  Head normocephalic and atraumatic.    PERRLA, EOMI. No conjunctival injection, no icterus.     TM's normal, nasal mucosa and mouth with no abnormalities.    Oropharynx is clear with no lesions.  NECK: Supple, no adenopathy or thyromegaly.  CV: RR. Normal S1,S2. No murmur, gallop or rub.          No JVD, Carotid pulses 2+ and sym. No bruits.  LUNGS:  Clear, no wheezes, rales or rhonchi.  BREASTS: Symmetric, no masses or tenderness. No nipple discharge.  AXILLA:  No masses or tenderness.  ABDOMEN: Soft, NT, " nondistended. Bowel sounds normal. No hepatosplenomegaly or masses. No rebound or guarding. No hernias.  EXTREMITIES:  No edema.   NEURO:  CN II-XII intact. Motor and sensation grossly intact. DTR'S normal and symmetric.  SKIN: No rashes or abnormal lesions.  Psych: Mood and affect are appropriate.    A chaperone was offered prior to physical exam and patient declined.     Lab Results   Component Value Date/Time    CHOLSTRLTOT 200 (H) 09/03/2020 08:05 AM     (H) 09/03/2020 08:05 AM    HDL 86 09/03/2020 08:05 AM    TRIGLYCERIDE 46 09/03/2020 08:05 AM       Lab Results   Component Value Date/Time    SODIUM 140 09/03/2020 08:05 AM    POTASSIUM 4.1 09/03/2020 08:05 AM    CHLORIDE 106 09/03/2020 08:05 AM    CO2 22 09/03/2020 08:05 AM    GLUCOSE 95 09/03/2020 08:05 AM    BUN 15 09/03/2020 08:05 AM    CREATININE 0.87 09/03/2020 08:05 AM    CREATININE 0.99 07/28/2011 09:02 AM    BUNCREATRAT 16 09/24/2014 09:54 AM    BUNCREATRAT 21 07/28/2011 09:02 AM    GLOMRATE 57 (L) 07/19/2010 08:25 AM     Lab Results   Component Value Date/Time    ALKPHOSPHAT 49 09/03/2020 08:05 AM    ASTSGOT 19 09/03/2020 08:05 AM    ALTSGPT 16 09/03/2020 08:05 AM    TBILIRUBIN 1.4 09/03/2020 08:05 AM        Lab Results   Component Value Date/Time    WBC 3.4 (L) 09/03/2020 08:05 AM    WBC 4.3 07/28/2011 09:02 AM    RBC 4.45 09/03/2020 08:05 AM    RBC 4.71 07/28/2011 09:02 AM    HEMOGLOBIN 14.3 09/03/2020 08:05 AM    HEMATOCRIT 42.5 09/03/2020 08:05 AM    MCV 95.5 09/03/2020 08:05 AM    MCV 96 07/28/2011 09:02 AM    MCH 32.1 09/03/2020 08:05 AM    MCH 32.5 07/28/2011 09:02 AM    MCHC 33.6 09/03/2020 08:05 AM    MPV 11.4 09/03/2020 08:05 AM    NEUTSPOLYS 45.70 09/03/2020 08:05 AM    LYMPHOCYTES 36.70 09/03/2020 08:05 AM    MONOCYTES 11.00 09/03/2020 08:05 AM    EOSINOPHILS 4.50 09/03/2020 08:05 AM    BASOPHILS 1.80 09/03/2020 08:05 AM              Assessment and Plan. The following treatment and monitoring plan is recommended:    1. Medicare  annual wellness visit, subsequent.  The  following diagnoses are all addressed.    2. SVT (supraventricular tachycardia) (HCC).  She has had no symptomatic episodes of SVT.  Status post ablation.  She continues on low-dose beta-blocker.    3. Osteopenia of both hips.  Continue calcium, vitamin D and exercise.    4. Essential hypertension.  Controlled on present medications    5. DDD (degenerative disc disease), lumbar.  Continue with exercise, occasional PT.    6. DDD (degenerative disc disease), cervical     7. Healthcare Maintenance. Counseled re: nutrition, activity and safety. Reviewed immunizations.       Services needed: none  Health Care Screening recommendations as per orders if indicated.  Referrals offered: PT/OT/Nutrition counseling/Behavioral Health/Smoking cessation as per orders if indicated.    Discussion today about general wellness and lifestyle habits:    · Prevent falls and reduce trip hazards; Cautioned about securing or removing rugs.  · Have a working fire alarm and carbon monoxide detector;   · Engage in regular physical activity and social activities   ·       Follow-up: 6 months

## 2020-09-25 ENCOUNTER — OFFICE VISIT (OUTPATIENT)
Dept: CARDIOLOGY | Facility: MEDICAL CENTER | Age: 75
End: 2020-09-25
Payer: MEDICARE

## 2020-09-25 VITALS
HEIGHT: 65 IN | RESPIRATION RATE: 16 BRPM | SYSTOLIC BLOOD PRESSURE: 126 MMHG | WEIGHT: 140 LBS | OXYGEN SATURATION: 100 % | BODY MASS INDEX: 23.32 KG/M2 | HEART RATE: 63 BPM | DIASTOLIC BLOOD PRESSURE: 70 MMHG

## 2020-09-25 DIAGNOSIS — I47.10 SVT (SUPRAVENTRICULAR TACHYCARDIA) (HCC): ICD-10-CM

## 2020-09-25 DIAGNOSIS — I10 ESSENTIAL HYPERTENSION: ICD-10-CM

## 2020-09-25 PROCEDURE — 93000 ELECTROCARDIOGRAM COMPLETE: CPT | Performed by: INTERNAL MEDICINE

## 2020-09-25 PROCEDURE — 99213 OFFICE O/P EST LOW 20 MIN: CPT | Performed by: INTERNAL MEDICINE

## 2020-09-25 ASSESSMENT — FIBROSIS 4 INDEX: FIB4 SCORE: 2.08

## 2020-09-25 NOTE — PROGRESS NOTES
"Arrhythmia Clinic Note (Established Patient)    Date of service: 9/25/2020    Reason for visit/Chief complaint: F/u PACs/AT    HPI:   Patient is a 74 yo F. History of HTN and SVT (alec AT with multiple foci). S/p prior ablation with Dr. Rdz. Maintained on low dose Metop for her palpitations and BP. Here for follow-up. Has been doing great. No complaints.     ROS: Negative for orthopnea, PND, palpitations, chest pain    Physical Exam:  Vitals:    09/25/20 0937   BP: 126/70   BP Location: Right arm   Patient Position: Sitting   BP Cuff Size: Adult   Pulse: 63   Resp: 16   SpO2: 100%   Weight: 63.5 kg (140 lb)   Height: 1.651 m (5' 5\")     Gen: NAD, conversant  HEENT: PERRL, EOMI  LUNGS: CTA B, no w/r/r  CV: RRR, no m/r/g, no JVD  Abd: Soft, NT/ND, +BS  Ext: no edema, warm and well perfused    Labs reviewed      EKG interpreted by me:  NSR    2015 NM stress WNL    Impression/Recs:  1. SVT (supraventricular tachycardia) (HCC)  EKG   2. Essential hypertension       -I think she is doing well from arrhythmia standpoint  -Continue low dose BB  -BP well controlled  - for her I do not think merits pharmacologic therapy  -F/u in 12 mo    Shalom Crockett MD    "

## 2020-09-26 LAB — EKG IMPRESSION: NORMAL

## 2021-01-11 DIAGNOSIS — Z23 NEED FOR VACCINATION: ICD-10-CM

## 2021-01-14 ENCOUNTER — IMMUNIZATION (OUTPATIENT)
Dept: FAMILY PLANNING/WOMEN'S HEALTH CLINIC | Facility: IMMUNIZATION CENTER | Age: 76
End: 2021-01-14
Attending: INTERNAL MEDICINE
Payer: MEDICARE

## 2021-01-14 DIAGNOSIS — Z23 NEED FOR VACCINATION: ICD-10-CM

## 2021-01-14 DIAGNOSIS — Z23 ENCOUNTER FOR VACCINATION: Primary | ICD-10-CM

## 2021-01-14 PROCEDURE — 0001A PFIZER SARS-COV-2 VACCINE: CPT

## 2021-01-14 PROCEDURE — 91300 PFIZER SARS-COV-2 VACCINE: CPT

## 2021-02-05 ENCOUNTER — IMMUNIZATION (OUTPATIENT)
Dept: FAMILY PLANNING/WOMEN'S HEALTH CLINIC | Facility: IMMUNIZATION CENTER | Age: 76
End: 2021-02-05
Attending: INTERNAL MEDICINE
Payer: MEDICARE

## 2021-02-05 DIAGNOSIS — Z23 ENCOUNTER FOR VACCINATION: Primary | ICD-10-CM

## 2021-02-05 PROCEDURE — 91300 PFIZER SARS-COV-2 VACCINE: CPT

## 2021-02-05 PROCEDURE — 0002A PFIZER SARS-COV-2 VACCINE: CPT

## 2021-04-07 DIAGNOSIS — B00.9 HSV INFECTION: ICD-10-CM

## 2021-04-07 RX ORDER — ACYCLOVIR 200 MG/1
800 CAPSULE ORAL 3 TIMES DAILY
Qty: 30 CAPSULE | Refills: 1 | Status: SHIPPED | OUTPATIENT
Start: 2021-04-07 | End: 2021-05-18

## 2021-05-11 RX ORDER — OMEPRAZOLE 20 MG/1
20 CAPSULE, DELAYED RELEASE ORAL DAILY
Qty: 90 CAPSULE | Refills: 0 | Status: SHIPPED | OUTPATIENT
Start: 2021-05-11 | End: 2021-08-05

## 2021-05-18 ENCOUNTER — OFFICE VISIT (OUTPATIENT)
Dept: INTERNAL MEDICINE | Facility: IMAGING CENTER | Age: 76
End: 2021-05-18
Payer: MEDICARE

## 2021-05-18 ENCOUNTER — HOSPITAL ENCOUNTER (OUTPATIENT)
Facility: MEDICAL CENTER | Age: 76
End: 2021-05-18
Attending: FAMILY MEDICINE
Payer: MEDICARE

## 2021-05-18 VITALS
HEIGHT: 65 IN | HEART RATE: 78 BPM | BODY MASS INDEX: 23.66 KG/M2 | TEMPERATURE: 97.4 F | RESPIRATION RATE: 14 BRPM | SYSTOLIC BLOOD PRESSURE: 126 MMHG | OXYGEN SATURATION: 98 % | DIASTOLIC BLOOD PRESSURE: 72 MMHG | WEIGHT: 142 LBS

## 2021-05-18 DIAGNOSIS — I47.10 SVT (SUPRAVENTRICULAR TACHYCARDIA) (HCC): ICD-10-CM

## 2021-05-18 DIAGNOSIS — K21.9 GASTROESOPHAGEAL REFLUX DISEASE WITHOUT ESOPHAGITIS: ICD-10-CM

## 2021-05-18 DIAGNOSIS — J30.2 SEASONAL ALLERGIES: ICD-10-CM

## 2021-05-18 DIAGNOSIS — R32 URINARY INCONTINENCE, UNSPECIFIED TYPE: ICD-10-CM

## 2021-05-18 DIAGNOSIS — K27.9 PUD (PEPTIC ULCER DISEASE): ICD-10-CM

## 2021-05-18 DIAGNOSIS — M51.36 DDD (DEGENERATIVE DISC DISEASE), LUMBAR: ICD-10-CM

## 2021-05-18 DIAGNOSIS — N39.3 STRESS INCONTINENCE: ICD-10-CM

## 2021-05-18 DIAGNOSIS — I10 ESSENTIAL HYPERTENSION: ICD-10-CM

## 2021-05-18 DIAGNOSIS — T78.40XD ALLERGY, SUBSEQUENT ENCOUNTER: ICD-10-CM

## 2021-05-18 PROCEDURE — 99215 OFFICE O/P EST HI 40 MIN: CPT | Mod: 25 | Performed by: FAMILY MEDICINE

## 2021-05-18 PROCEDURE — 87086 URINE CULTURE/COLONY COUNT: CPT

## 2021-05-18 PROCEDURE — 96372 THER/PROPH/DIAG INJ SC/IM: CPT | Performed by: FAMILY MEDICINE

## 2021-05-18 RX ORDER — TRIAMCINOLONE ACETONIDE 40 MG/ML
40 INJECTION, SUSPENSION INTRA-ARTICULAR; INTRAMUSCULAR ONCE
Status: COMPLETED | OUTPATIENT
Start: 2021-05-18 | End: 2021-05-18

## 2021-05-18 RX ADMIN — TRIAMCINOLONE ACETONIDE 40 MG: 40 INJECTION, SUSPENSION INTRA-ARTICULAR; INTRAMUSCULAR at 09:23

## 2021-05-18 ASSESSMENT — PATIENT HEALTH QUESTIONNAIRE - PHQ9: CLINICAL INTERPRETATION OF PHQ2 SCORE: 0

## 2021-05-18 ASSESSMENT — FIBROSIS 4 INDEX: FIB4 SCORE: 2.11

## 2021-05-18 NOTE — ASSESSMENT & PLAN NOTE
Patient education on avoidance of triggers including NSAIDs, alcohol patient will continue with omeprazole in the morning and start taking Pepcid in the evening  Follow-up if not improving

## 2021-05-18 NOTE — PROGRESS NOTES
"Chief Complaint   Patient presents with   • Establish Care   • Gas     bloating, gas, burps   • Nasal Congestion     hoarse voice   • Enuresis       Subjective:     HPI:   Heriberto Nichols is a 76 y.o. female here to establish care and to discuss the evaluation and management of:       Problem   Seasonal Allergies    Patient has been using Flonase and saline rinsing     Stress Incontinence    Has been minimal but reports now she had an experience of more leakage than usual she thinks the urine may be darker sometimes although reports no dysuria or increase in frequency       Gastroesophageal Reflux Disease Without Esophagitis    Currently on Prilosec for the last 2 years there has been some weight gain and changes to her diet such as occasional wine with dinner and eating out more       Ddd (Degenerative Disc Disease), Lumbar   SVT (supraventricular tachycardia) (HCC)    She has had no symptomatic episodes of SVT.  Status post ablation.  She continues on low-dose beta-blocker       History of PUD (peptic ulcer disease)   Hypertension    Doing well on meds        She also reports a feeling of pulling in her lower legs below the knees her physical therapist thinks it might be myofascial tension       Objective:     /72   Pulse 78   Temp 36.3 °C (97.4 °F) (Temporal)   Resp 14   Ht 1.651 m (5' 5\")   Wt 64.4 kg (142 lb)   SpO2 98%  Body mass index is 23.63 kg/m².    Physical Exam:  Physical Exam  Constitutional: Well-developed and well-nourished. Not diaphoretic. No distress.   Skin: Skin is warm and dry. No rash noted.  Head: Atraumatic without lesions.  Eyes: Conjunctivae and extraocular motions are normal.   Ears:  External ears unremarkable.    Nose: Nares patent. Mucosa without edema or erythema. No discharge. No facial tenderness.     Neck: Supple, No thyromegaly present. No JVD  Cardiovascular: Regular rate and rhythm.   Chest: Effort normal. Clear to auscultation throughout. No adventitious " sounds.   Abdomen:  without distention.  No masses.  Extremities: No cyanosis, clubbing, erythema, nor edema.   Neurological: Alert and oriented x 3.    Psychiatric:  Behavior, mood, and affect are appropriate     Assessment and Plan:     The following treatment plan was discussed/researched:  Hypertension  Controlled on Lopressor and Avapro  -stable continue current regimen        DDD (degenerative disc disease), lumbar     Continue  w/ exercise and occasional PT.       History of PUD (peptic ulcer disease)  Patient has been avoiding aspirin  Today we reviewed NSAIDs must be avoided as well      SVT (supraventricular tachycardia) (HCC)  Stable continue current regimen and routine follow-up with cardiologist    Gastroesophageal reflux disease without esophagitis  Patient education on avoidance of triggers including NSAIDs, alcohol patient will continue with omeprazole in the morning and start taking Pepcid in the evening  Follow-up if not improving      Seasonal allergies  Discussed options  We will continue with Flonase and saline irrigation    Stress incontinence  Check urine culture if negative will refer to pelvic floor physical therapy                                                                                                                                                                                 Any change or worsening of signs or symptoms, patient encouraged to follow-up or report to emergency room for further evaluation. Patient verbalizes understanding and agrees.    Follow-Up:   In September for annual wellness visit    PLEASE NOTE: This dictation was created using voice recognition software. I have made every reasonable attempt to correct obvious errors, but I expect that there are errors of grammar and possibly content that I did not discover before finalizing the note.      My total time spent caring for the patient on the day of the encounter was  greater than 40 minutes.   This includes  obtaining history, reviewing chart, physical exam, patient education, reviewing outside records, placing orders, interpreting tests and coordinating care.

## 2021-05-20 DIAGNOSIS — N39.3 STRESS INCONTINENCE: ICD-10-CM

## 2021-05-20 LAB
BACTERIA UR CULT: NORMAL
SIGNIFICANT IND 70042: NORMAL
SITE SITE: NORMAL
SOURCE SOURCE: NORMAL

## 2021-06-17 DIAGNOSIS — Z78.0 MENOPAUSE: ICD-10-CM

## 2021-06-17 RX ORDER — ESTRADIOL 0.5 MG/1
TABLET ORAL
Qty: 90 TABLET | Refills: 3 | Status: SHIPPED | OUTPATIENT
Start: 2021-06-17 | End: 2022-02-18

## 2021-07-01 NOTE — PROGRESS NOTES
Chief Complaint   Patient presents with   • Otalgia     right       HISTORY OF PRESENT ILLNESS: Patient is a 72 y.o. female established patient who presents today complaining of right ear and TMJ pain. She states about 5 days ago she fell asleep with her ear bones in her ears. She slept all night. When she woke up her right ear was painful. It has been painful since then. It does not hurt to sitting but if she moves her jaw or choose she has some discomfort. She does have some allergy symptoms. Occasionally she gets pain that does extend up the side of her face into the temple area as well as down along her jaw. It does seem to be associated with movement of her jaw. Her ear is slightly tender to touch. She has had no drainage.      Patient Active Problem List    Diagnosis Date Noted   • SVT (supraventricular tachycardia) ([facility]) 02/09/2015     Priority: High   • Benign microscopic hematuria 02/09/2017   • Cervical stenosis of spinal canal 08/08/2016   • DDD (degenerative disc disease), cervical 07/01/2015   • DDD (degenerative disc disease), lumbar 05/04/2015   • Chest pain 02/07/2015   • History of PUD (peptic ulcer disease) 02/07/2015   • Epigastric pain 07/15/2014   • Hypertension 06/18/2013   • PAC (premature atrial contraction)    • Allergic state    • Rosacea    • IBS (irritable bowel syndrome)    • Osteopenia    • Hiatal hernia      Current Outpatient Prescriptions on File Prior to Visit   Medication Sig Dispense Refill   • FINACEA 15 % Gel 1 APPLICATION APPLY ON THE SKIN TWICE A DAY  6   • TRAVATAN Z 0.004 % Solution INSTILL 1 GTT IN OU HS  3   • AVENOVA 0.01 % Solution APPLY 1ML TO EYELIDS DAILY  3   • metoprolol (LOPRESSOR) 25 MG Tab Take 1 Tab by mouth 2 Times a Day. 60 Tab 0   • Calcium Carbonate-Vitamin D (CALCIUM + D PO) Take 1 Tab by mouth every day.     • Cyanocobalamin (VITAMIN B-12 PO) Take 1 Tab by mouth every day.     • fluticasone (FLONASE) 50 MCG/ACT nasal spray USE 2 SPRAYS IN EACH NOSTRIL  "DAILY 16 Bottle 2   • estradiol (ESTRACE) 0.5 MG tablet TAKE 1 TABLET DAILY 90 Tab 3   • hyoscyamine (LEVSIN) 0.125 MG tablet Take 1 Tab by mouth every 6 hours as needed for Cramping. 30 Tab 1   • losartan (COZAAR) 50 MG Tab Take 1 Tab by mouth every day. TAKE 1 TAB BY MOUTH EVERY DAY. 90 Tab 3   • azelastine (ASTELIN) 137 MCG/SPRAY nasal spray [place] 1 Spray in nose 2 times a day. 30 mL 3   • omeprazole (PRILOSEC) 20 MG delayed-release capsule TAKE 1 CAPSULE BY MOUTH EVERY DAY. 90 Cap 3   • metronidazole (METROLOTION) 0.75 % Lotion 1 APPLICATION APPLY ON THE SKIN TWICE A DAY  6   • PATADAY 0.2 % Solution INSTILL 1 DROP IN BOTH EYES DAILY  4   • LUTEIN PO Take 1 Tab by mouth every day.     • multivitamin (THERAGRAN) Tab Take 1 Tab by mouth every day.     • VITAMIN E PO Take 1 Cap by mouth every day.     • Omega-3 Fatty Acids (FISH OIL) 1000 MG CAPS capsule Take 1,000 mg by mouth every day.       No current facility-administered medications on file prior to visit.       Past medical, surgical, family, and social history is reviewed and updated in Epic chart by me today.   Medications and allergies reviewed and updated in Epic chart by me today.     REVIEW OF SYSTEMS:  GENERAL: No fatigue, no weight loss.  HEENT:  Ears--as above. No change in hearing. No tinnitus or dizziness.                 Eyes--no blurred vision, no discharge or pain                 Throat--No sore throat, no dysphagia, no hoarseness  CV:  No chest pain,dyspnea,palpitations or edema.  RESP:  No sob,cough,wheezing or hemoptysis.      Filed Vitals:    04/25/17 1600   BP: 126/65   Pulse: 80   Temp: 37 °C (98.6 °F)   Resp: 14   Height: 1.651 m (5' 5\")   Weight: 61.689 kg (136 lb)   SpO2: 97%     Physical Exam:  GENERAL;  WD/WN, No acute distress.  HEENT:  PERRLA, EOMI, no conjuctival injection, no icterus.                 TM's normal bilat. her right external canal is tender anteriorly and proximally. There is a little erythema there.                 " Nasal mucosa normal                 Pharynx clear.. No exudate.  Temporomandibular joint with full range of motion and no tenderness to palpation, although she does complain of discomfort when she opens it all the way. There is no clicking or crepitus.   Temporal area is nontender.   NECK: Supple with no adenopathy or thyromegaly.  LUNGS: Clear with no rales, rhonchi, or wheezes.  COR: RR with no murmur, gallop or rub.  EXT: No edema.    Assessment/Plan:  1. Otalgia, right . Do think she may just irritated and bruised the external canal. I think she is having some referred pain into the jaw. Encouraged her to try taking Tylenol twice daily as needed, apply ice to the area. Monitor symptoms and call if things are not improving.             Protocol For Broad Band Uvb: The patient received Broad Band UVB.

## 2021-07-13 DIAGNOSIS — T78.40XD ALLERGY, SUBSEQUENT ENCOUNTER: ICD-10-CM

## 2021-07-13 RX ORDER — FLUTICASONE PROPIONATE 50 MCG
2 SPRAY, SUSPENSION (ML) NASAL DAILY
Qty: 9.9 ML | Refills: 6 | Status: SHIPPED | OUTPATIENT
Start: 2021-07-13

## 2021-08-05 RX ORDER — OMEPRAZOLE 20 MG/1
CAPSULE, DELAYED RELEASE ORAL
Qty: 90 CAPSULE | Refills: 0 | Status: SHIPPED | OUTPATIENT
Start: 2021-08-05 | End: 2021-12-29 | Stop reason: SDUPTHER

## 2021-08-30 DIAGNOSIS — I10 ESSENTIAL HYPERTENSION: ICD-10-CM

## 2021-08-31 ENCOUNTER — HOSPITAL ENCOUNTER (OUTPATIENT)
Facility: MEDICAL CENTER | Age: 76
End: 2021-08-31
Attending: FAMILY MEDICINE
Payer: MEDICARE

## 2021-08-31 ENCOUNTER — NON-PROVIDER VISIT (OUTPATIENT)
Dept: INTERNAL MEDICINE | Facility: IMAGING CENTER | Age: 76
End: 2021-08-31
Payer: MEDICARE

## 2021-08-31 DIAGNOSIS — I10 ESSENTIAL HYPERTENSION: ICD-10-CM

## 2021-08-31 LAB
ALBUMIN SERPL BCP-MCNC: 4.2 G/DL (ref 3.2–4.9)
ALBUMIN/GLOB SERPL: 1.7 G/DL
ALP SERPL-CCNC: 45 U/L (ref 30–99)
ALT SERPL-CCNC: 22 U/L (ref 2–50)
ANION GAP SERPL CALC-SCNC: 12 MMOL/L (ref 7–16)
AST SERPL-CCNC: 23 U/L (ref 12–45)
BASOPHILS # BLD AUTO: 0.7 % (ref 0–1.8)
BASOPHILS # BLD: 0.03 K/UL (ref 0–0.12)
BILIRUB SERPL-MCNC: 1.6 MG/DL (ref 0.1–1.5)
BUN SERPL-MCNC: 10 MG/DL (ref 8–22)
CALCIUM SERPL-MCNC: 9.3 MG/DL (ref 8.5–10.5)
CHLORIDE SERPL-SCNC: 105 MMOL/L (ref 96–112)
CHOLEST SERPL-MCNC: 180 MG/DL (ref 100–199)
CO2 SERPL-SCNC: 20 MMOL/L (ref 20–33)
CREAT SERPL-MCNC: 0.85 MG/DL (ref 0.5–1.4)
EOSINOPHIL # BLD AUTO: 0.04 K/UL (ref 0–0.51)
EOSINOPHIL NFR BLD: 1 % (ref 0–6.9)
ERYTHROCYTE [DISTWIDTH] IN BLOOD BY AUTOMATED COUNT: 43.8 FL (ref 35.9–50)
GLOBULIN SER CALC-MCNC: 2.5 G/DL (ref 1.9–3.5)
GLUCOSE SERPL-MCNC: 100 MG/DL (ref 65–99)
HCT VFR BLD AUTO: 42.5 % (ref 37–47)
HDLC SERPL-MCNC: 84 MG/DL
HGB BLD-MCNC: 14.7 G/DL (ref 12–16)
IMM GRANULOCYTES # BLD AUTO: 0.01 K/UL (ref 0–0.11)
IMM GRANULOCYTES NFR BLD AUTO: 0.2 % (ref 0–0.9)
LDLC SERPL CALC-MCNC: 84 MG/DL
LYMPHOCYTES # BLD AUTO: 0.95 K/UL (ref 1–4.8)
LYMPHOCYTES NFR BLD: 23.3 % (ref 22–41)
MCH RBC QN AUTO: 33 PG (ref 27–33)
MCHC RBC AUTO-ENTMCNC: 34.6 G/DL (ref 33.6–35)
MCV RBC AUTO: 95.3 FL (ref 81.4–97.8)
MONOCYTES # BLD AUTO: 0.37 K/UL (ref 0–0.85)
MONOCYTES NFR BLD AUTO: 9.1 % (ref 0–13.4)
NEUTROPHILS # BLD AUTO: 2.67 K/UL (ref 2–7.15)
NEUTROPHILS NFR BLD: 65.7 % (ref 44–72)
NRBC # BLD AUTO: 0 K/UL
NRBC BLD-RTO: 0 /100 WBC
PLATELET # BLD AUTO: 240 K/UL (ref 164–446)
PMV BLD AUTO: 10.8 FL (ref 9–12.9)
POTASSIUM SERPL-SCNC: 3.8 MMOL/L (ref 3.6–5.5)
PROT SERPL-MCNC: 6.7 G/DL (ref 6–8.2)
RBC # BLD AUTO: 4.46 M/UL (ref 4.2–5.4)
SODIUM SERPL-SCNC: 137 MMOL/L (ref 135–145)
TRIGL SERPL-MCNC: 58 MG/DL (ref 0–149)
TSH SERPL DL<=0.005 MIU/L-ACNC: 2.21 UIU/ML (ref 0.38–5.33)
WBC # BLD AUTO: 4.1 K/UL (ref 4.8–10.8)

## 2021-08-31 PROCEDURE — 80053 COMPREHEN METABOLIC PANEL: CPT

## 2021-08-31 PROCEDURE — 85025 COMPLETE CBC W/AUTO DIFF WBC: CPT

## 2021-08-31 PROCEDURE — 84443 ASSAY THYROID STIM HORMONE: CPT

## 2021-08-31 PROCEDURE — 80061 LIPID PANEL: CPT

## 2021-09-03 ENCOUNTER — OFFICE VISIT (OUTPATIENT)
Dept: INTERNAL MEDICINE | Facility: IMAGING CENTER | Age: 76
End: 2021-09-03
Payer: MEDICARE

## 2021-09-03 VITALS
DIASTOLIC BLOOD PRESSURE: 74 MMHG | WEIGHT: 141.09 LBS | BODY MASS INDEX: 23.51 KG/M2 | HEIGHT: 65 IN | HEART RATE: 75 BPM | TEMPERATURE: 98.6 F | RESPIRATION RATE: 14 BRPM | OXYGEN SATURATION: 98 % | SYSTOLIC BLOOD PRESSURE: 118 MMHG

## 2021-09-03 DIAGNOSIS — F41.9 ANXIETY: ICD-10-CM

## 2021-09-03 DIAGNOSIS — L03.818 CELLULITIS OF OTHER SPECIFIED SITE: ICD-10-CM

## 2021-09-03 PROCEDURE — 99214 OFFICE O/P EST MOD 30 MIN: CPT | Mod: 25 | Performed by: FAMILY MEDICINE

## 2021-09-03 PROCEDURE — 10060 I&D ABSCESS SIMPLE/SINGLE: CPT | Performed by: FAMILY MEDICINE

## 2021-09-03 RX ORDER — DOXYCYCLINE HYCLATE 100 MG
100 TABLET ORAL 2 TIMES DAILY
Qty: 20 TABLET | Refills: 0 | Status: SHIPPED | OUTPATIENT
Start: 2021-09-03 | End: 2021-09-28

## 2021-09-03 RX ORDER — ESCITALOPRAM OXALATE 10 MG/1
10 TABLET ORAL DAILY
Qty: 90 TABLET | Refills: 3 | Status: SHIPPED | OUTPATIENT
Start: 2021-09-03 | End: 2022-02-18

## 2021-09-03 RX ORDER — DOXYCYCLINE HYCLATE 100 MG
100 TABLET ORAL 2 TIMES DAILY
Qty: 20 TABLET | Refills: 0 | Status: CANCELLED | OUTPATIENT
Start: 2021-09-03

## 2021-09-03 ASSESSMENT — FIBROSIS 4 INDEX: FIB4 SCORE: 1.59

## 2021-09-03 NOTE — PROGRESS NOTES
"Chief Complaint   Patient presents with   • Hand Injury     right thumb       Subjective:     HPI:   Heriberto Nichols is a 76 y.o. female here to discuss the evaluation and management of:     1.  Right thumb she has had increased-redness and swelling over the last couple of days  No clear injury  No fever no chills      2.  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     Objective:     /74   Pulse 75   Temp 37 °C (98.6 °F) (Temporal)   Resp 14   Ht 1.651 m (5' 5\")   Wt 64 kg (141 lb 1.5 oz)   SpO2 98%  Body mass index is 23.48 kg/m².    Physical Exam:  Physical Exam  Constitutional: Well-developed and well-nourished. Not diaphoretic. No distress.   Skin: Skin is warm and dry. No rash noted.  Head: Atraumatic without lesions.  Eyes: Conjunctivae and extraocular motions are normal.   Ears:  External ears unremarkable.    Nose: Nares patent. Mucosa without edema or erythema. No discharge. No facial tenderness.     Neck: Supple, No thyromegaly present. No JVD     Chest: Effort normal.    Abdomen:  without distention.  .  Extremities: No cyanosis, clubbing, moderate right thumb erythema, with swelling over the entire thumb    Neurological: Alert and oriented x 3.    Psychiatric:  Behavior, mood, and affect are appropriate     Assessment and Plan:     The following treatment plan was discussed:     No problem-specific Assessment & Plan notes found for this encounter.    1. Cellulitis of other specified site  doxycycline (VIBRAMYCIN) 100 MG Tab   2. Anxiety  escitalopram (LEXAPRO) 10 MG Tab   Lengthy discussion regarding antidepressants and expectations of treatment         Any change or worsening of signs or symptoms, patient encouraged to follow-up or report to emergency room for further evaluation. Patient verbalizes understanding and " agrees.    Follow-Up: No follow-ups on file.      PLEASE NOTE: This dictation was created using voice recognition software. I have made every reasonable attempt to correct obvious errors, but I expect that there are errors of grammar and possibly content that I did not discover before finalizing the note.    My total time spent caring for the patient on the day of the encounter was  greater than 30 minutes.   This includes obtaining history, reviewing chart, physical exam, patient education, reviewing outside records, placing orders, interpreting tests and coordinating care.            Addendum; next morning pt called answering service as it was more swollen and now with visible pocket of purulent drainage.    SUBJECTIVE:   Heriberto Nichols is a 76 y.o. female who presents for I&Dl. We have already discussed this procedure, including option of not performing surgery, technique of surgery and potential for scarring at a recent visit.    OBJECTIVE:   Patient appears well.      Rt thumb with increased erythema and visible subcut. purulence  ASSESSMENT: paonychial   abcess     PLAN:   After informed consent was obtained, using Betadine for cleansing and 1% Lidocaine w/o epinephrine for anesthetic, with sterile technique,  I&Dwas performed. Antibiotic dressing is applied, and wound care instructions provided.  Be alert for any signs of persistant  infection. The procedure was well tolerated without complications. Follow up: the patient may return prn.

## 2021-09-09 ENCOUNTER — OFFICE VISIT (OUTPATIENT)
Dept: INTERNAL MEDICINE | Facility: IMAGING CENTER | Age: 76
End: 2021-09-09
Payer: MEDICARE

## 2021-09-09 VITALS
DIASTOLIC BLOOD PRESSURE: 62 MMHG | BODY MASS INDEX: 22.68 KG/M2 | SYSTOLIC BLOOD PRESSURE: 122 MMHG | RESPIRATION RATE: 17 BRPM | HEART RATE: 73 BPM | OXYGEN SATURATION: 97 % | WEIGHT: 141.09 LBS | HEIGHT: 66 IN | TEMPERATURE: 97.9 F

## 2021-09-09 DIAGNOSIS — L03.818 CELLULITIS OF OTHER SPECIFIED SITE: ICD-10-CM

## 2021-09-09 DIAGNOSIS — I10 ESSENTIAL HYPERTENSION: ICD-10-CM

## 2021-09-09 PROCEDURE — 99213 OFFICE O/P EST LOW 20 MIN: CPT | Mod: 24 | Performed by: FAMILY MEDICINE

## 2021-09-09 RX ORDER — IRBESARTAN 75 MG/1
75 TABLET ORAL DAILY
Qty: 90 TABLET | Refills: 3 | Status: SHIPPED | OUTPATIENT
Start: 2021-09-09 | End: 2022-10-05

## 2021-09-09 ASSESSMENT — FIBROSIS 4 INDEX: FIB4 SCORE: 1.59

## 2021-09-09 NOTE — PROGRESS NOTES
"Chief Complaint   Patient presents with   • Follow-Up     Right thumb is not getting any better       Subjective:     HPI:   Heriberto Nichols is a 76 y.o. female here  to discuss the evaluation and management of:     1. F/u thumb  abscess /cellulitis       2.  HTN- needs med RF's    No problems updated.     Objective:     /62 (BP Location: Left arm, Patient Position: Sitting, BP Cuff Size: Adult)   Pulse 73   Temp 36.6 °C (97.9 °F) (Temporal)   Resp 17   Ht 1.664 m (5' 5.5\")   Wt 64 kg (141 lb 1.5 oz)   SpO2 97%  Body mass index is 23.12 kg/m².    Physical Exam:  Physical Exam  Constitutional: Well-developed and well-nourished. Not diaphoretic. No distress.   Skin: Skin is warm and dry. No rash noted.  Head: Atraumatic without lesions.  Eyes: Conjunctivae and extraocular motions are normal.   Ears:  External ears unremarkable.    Nose: Nares patent. Mucosa without edema or erythema. No discharge. No facial tenderness.     Neck: Supple, No thyromegaly present. No JVD  Cardiovascular: Regular rate and rhythm.   Chest: Effort normal. Clear to auscultation throughout. No adventitious sounds.   Abdomen:  without distention.  .  Extremities: No cyanosis, clubbing, , nor edema.  Continues to have mild erythema right thumb  Neurological: Alert and oriented x 3.    Psychiatric:  Behavior, mood, and affect are appropriate     Assessment and Plan:     The following treatment plan was discussed:     No problem-specific Assessment & Plan notes found for this encounter.    1. Cellulitis of other specified site   improving continue with regimen refill Avapro   2. Essential hypertension   stable continue current regimen            Any change or worsening of signs or symptoms, patient encouraged to follow-up or report to emergency room for further evaluation. Patient verbalizes understanding and agrees.    Follow-Up: No follow-ups on file.      PLEASE NOTE: This dictation was created using voice recognition " software. I have made every reasonable attempt to correct obvious errors, but I expect that there are errors of grammar and possibly content that I did not discover before finalizing the note.    My total time spent caring for the patient on the day of the encounter was  greater than 20 minutes.   This includes obtaining history, reviewing chart, physical exam, patient education, reviewing outside records, placing orders, interpreting tests and coordinating care.

## 2021-09-13 ENCOUNTER — OFFICE VISIT (OUTPATIENT)
Dept: INTERNAL MEDICINE | Facility: IMAGING CENTER | Age: 76
End: 2021-09-13
Payer: MEDICARE

## 2021-09-13 VITALS
SYSTOLIC BLOOD PRESSURE: 125 MMHG | HEART RATE: 75 BPM | DIASTOLIC BLOOD PRESSURE: 80 MMHG | TEMPERATURE: 98.1 F | RESPIRATION RATE: 14 BRPM | OXYGEN SATURATION: 95 % | BODY MASS INDEX: 20.57 KG/M2 | WEIGHT: 128 LBS | HEIGHT: 66 IN

## 2021-09-13 DIAGNOSIS — Z78.0 ASYMPTOMATIC POSTMENOPAUSAL STATUS: ICD-10-CM

## 2021-09-13 DIAGNOSIS — Z00.00 MEDICARE ANNUAL WELLNESS VISIT, SUBSEQUENT: Primary | ICD-10-CM

## 2021-09-13 PROCEDURE — G0439 PPPS, SUBSEQ VISIT: HCPCS | Performed by: FAMILY MEDICINE

## 2021-09-13 ASSESSMENT — FIBROSIS 4 INDEX: FIB4 SCORE: 1.59

## 2021-09-15 NOTE — PROGRESS NOTES
Chief Complaint   Patient presents with   • Annual Wellness Visit         HPI:  Jaun Nichols is a 76 y.o. 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        Patient Active Problem List    Diagnosis Date Noted   • Seasonal allergies 05/18/2021   • Stress incontinence 05/18/2021   • Gastroesophageal reflux disease without esophagitis 08/22/2019   • Benign microscopic hematuria 02/09/2017   • Cervical stenosis of spinal canal 08/08/2016   • DDD (degenerative disc disease), cervical 07/01/2015   • DDD (degenerative disc disease), lumbar 05/04/2015   • SVT (supraventricular tachycardia) ([facility]) 02/09/2015   • History of PUD (peptic ulcer disease) 02/07/2015   • Hypertension 06/18/2013   • Allergic state    • Rosacea    • IBS (irritable bowel syndrome)    • Osteopenia    • Hiatal hernia        Current Outpatient Medications   Medication Sig Dispense Refill   • irbesartan (AVAPRO) 75 MG tablet Take 1 Tablet by mouth every day. 90 Tablet 3   • doxycycline (VIBRAMYCIN) 100 MG Tab Take 1 Tablet by mouth 2 times a day. 20 Tablet 0   • escitalopram (LEXAPRO) 10 MG Tab Take 1 Tablet by mouth every day. 90 Tablet 3   • omeprazole (PRILOSEC) 20 MG delayed-release capsule TAKE 1 CAPSULE BY MOUTH EVERY DAY 90 capsule 0   • fluticasone (FLONASE) 50 MCG/ACT nasal spray Administer 2 Sprays into affected nostril(S) every day. 9.9 mL 6   • estradiol (ESTRACE) 0.5 MG tablet TAKE 1 TABLET BY MOUTH DAILY 90 tablet 3   • metoprolol (LOPRESSOR) 25 MG Tab Take 1 Tab by mouth 2 times a day. 180 Tab 3   • hyoscyamine (LEVSIN) 0.125 MG tablet Take 1 Tab by mouth every 6 hours as needed for Cramping. 30 Tab 1   • FINACEA 15 % Gel 1 APPLICATION APPLY ON THE SKIN TWICE A DAY  6   • TRAVATAN Z 0.004 % Solution INSTILL 1 GTT IN OU HS  3   • metronidazole (METROLOTION) 0.75 % Lotion 1 APPLICATION APPLY ON THE SKIN TWICE A DAY  6   • PATADAY 0.2 %  Solution INSTILL 1 DROP IN BOTH EYES DAILY  4   • LUTEIN PO Take 1 Tab by mouth every day.     • Calcium Carbonate-Vitamin D (CALCIUM + D PO) Take 1 Tab by mouth every day.     • Omega-3 Fatty Acids (FISH OIL) 1000 MG CAPS capsule Take 1,000 mg by mouth every day.       No current facility-administered medications for this visit.            Current supplements as per medication list.       Allergies: Asa [aspirin] and Shellfish allergy    Current social contact/activities: *Family and friends    She  reports that she has never smoked. She has never used smokeless tobacco. She reports current alcohol use. She reports that she does not use drugs.  Counseling given: Not Answered        DPA/Advanced Directive:  Patient has no  Advanced Directive on file.       ROS:    Gait: Uses no assistive device   Ostomy: no   Other tubes: no   Amputations: no   Chronic oxygen use: no   Last eye exam: Up-to-date  : Denies any urinary leakage during the last 6 months incontinence.       Screening:     Depression Screening    Little interest or pleasure in doing things?   0  Feeling down, depressed , or hopeless?  0  Trouble falling or staying asleep, or sleeping too much?   0  Feeling tired or having little energy?   0  Poor appetite or overeating?   0  Feeling bad about yourself - or that you are a failure or have let yourself or your family down?  0  Trouble concentrating on things, such as reading the newspaper or watching television?  0  Moving or speaking so slowly that other people could have noticed.  Or the opposite - being so fidgety or restless that you have been moving around a lot more than usual?   0  Thoughts that you would be better off dead, or of hurting yourself?   0  Patient Health Questionnaire Score:  0    If depressive symptoms identified deferred to follow up visit unless specifically addressed in assessment and plan.    Interpretation of PHQ-9 Total Score   Score Severity   1-4 No Depression   5-9 Mild  Depression   10-14 Moderate Depression   15-19 Moderately Severe Depression   20-27 Severe Depression      Screening for Cognitive Impairment    Three Minute Recall (captain, garden, picture)  3/3    Den clock face with all 12 numbers and set the hands to show 5 past 8.     nl  Cognitive concerns identified deferred for follow up unless specifically addressed in assessment and plan.    Fall Risk Assessment    Has the patient had two or more falls in the last year or any fall with injury in the last year?   no    Safety Assessment    Throw rugs on floor.   no  Handrails on all stairs.   yes  Good lighting in all hallways. yes    Difficulty hearing. no    Patient counseled about all safety risks that were identified.    Functional Assessment ADLs    Are there any barriers preventing you from cooking for yourself or meeting nutritional needs?   .no    Are there any barriers preventing you from driving safely or obtaining transportation?   .no    Are there any barriers preventing you from using a telephone or calling for help?   no.    Are there any barriers preventing you from shopping?   .no    Are there any barriers preventing you from taking care of your own finances?   .no    Are there any barriers preventing you from managing your medications?   .no    Are there any barriers preventing you from showering, bathing or dressing yourself?  .no    Are you currently engaging in any exercise or physical activity?   .no     What is your perception of your health?   .good    Health Maintenance Summary                IMM INFLUENZA  due 9/1/2021      Done 9/5/2020 Imm Admin: Influenza (IM) Preservative Free - HISTORICAL DATA     Patient has more history with this topic...    Annual Wellness Visit   9/14/2021      Done 9/13/2020 SUBSEQUENT ANNUAL WELLNESS VISIT-INCLUDES PPPS ()     Patient has more history with this topic...    MAMMOGRAM Next Due 11/13/2021      Done 11/13/2020 [id number]     Patient has  "more history with this topic...    COLORECTAL CANCER SCREENING Next Due 5/21/2023     IMM DTaP/Tdap/Td Vaccine Next Due 6/17/2023      Done 6/17/2013 Imm Admin: Tdap Vaccine     Patient has more history with this topic...    BONE DENSITY Next Due 11/12/2024      Done 11/12/2019 DS-BONE DENSITY STUDY ([facility])     Patient has more history with this topic...          Patient Care Team:  Jayda Garcia M.D. as PCP - General (Family Medicine)  Soco Arzola R.N. as Registered Nurse      Social History     Tobacco Use   • Smoking status: Never Smoker   • Smokeless tobacco: Never Used   Vaping Use   • Vaping Use: Never used   Substance Use Topics   • Alcohol use: Yes     Comment: rare   • Drug use: No     Family History   Problem Relation Age of Onset   • Cancer Mother         uterine   • Hypertension Mother    • Heart Disease Mother         arrythmia   • Cancer Father    • Cancer Maternal Aunt         breast     She  has a past medical history of Allergy, Benign microscopic hematuria (2/9/2017), DDD (degenerative disc disease), lumbar (5/4/2015), Glaucoma, Hiatal hernia, IBS (irritable bowel syndrome), Osteopenia, PAC (premature atrial contraction), Rosacea, Seasonal allergies (5/18/2021), SVT (supraventricular tachycardia) ([facility]) (2/9/2015), and Ulcer (2004).   Past Surgical History:   Procedure Laterality Date   • COLONOSCOPY  2003    recheck 10 years   • ABDOMINAL HYSTERECTOMY TOTAL  1987   • BUNIONECTOMY      x2       Exam:     /80   Pulse 75   Temp 36.7 °C (98.1 °F) (Temporal)   Resp 14   Ht 1.664 m (5' 5.51\")   Wt 58.1 kg (128 lb)   SpO2 95%  Body mass index is 20.97 kg/m².    Hearing good.    Dentition good  Alert, oriented in no acute distress.  Eye contact is good, speech goal directed, affect calm    Physical Exam  Constitutional:       General: She is not in acute distress.     Appearance: Normal appearance.   HENT:      Head: Normocephalic and atraumatic.      Nose: Nose normal.      " Mouth/Throat:      Mouth: Mucous membranes are moist.   Eyes:      Conjunctiva/sclera: Conjunctivae normal.   Cardiovascular:      Rate and Rhythm: Normal rate and regular rhythm.   Pulmonary:      Effort: Pulmonary effort is normal.      Breath sounds: Normal breath sounds. No wheezing.   Abdominal:      General: Abdomen is flat.      Palpations: Abdomen is soft.   Musculoskeletal:      Cervical back: No rigidity.      Right lower leg: No edema.      Left lower leg: No edema.   Lymphadenopathy:      Cervical: No cervical adenopathy.   Skin:     Coloration: Skin is not jaundiced.      Findings: No erythema.   Neurological:      General: No focal deficit present.      Mental Status: She is alert and oriented to person, place, and time. Mental status is at baseline.   Psychiatric:         Mood and Affect: Mood normal.         Behavior: Behavior normal.         Thought Content: Thought content normal.         Judgment: Judgment normal.        Results for DARLENE GLASS (MRN [id number]) as of 9/17/2021 09:18   Ref. Range 8/31/2021 08:40   WBC Latest Ref Range: 4.8 - 10.8 K/uL 4.1 (L)   RBC Latest Ref Range: 4.20 - 5.40 M/uL 4.46   Hemoglobin Latest Ref Range: 12.0 - 16.0 g/dL 14.7   Hematocrit Latest Ref Range: 37.0 - 47.0 % 42.5   MCV Latest Ref Range: 81.4 - 97.8 fL 95.3   MCH Latest Ref Range: 27.0 - 33.0 pg 33.0   MCHC Latest Ref Range: 33.6 - 35.0 g/dL 34.6   RDW Latest Ref Range: 35.9 - 50.0 fL 43.8   Platelet Count Latest Ref Range: 164 - 446 K/uL 240   MPV Latest Ref Range: 9.0 - 12.9 fL 10.8   Neutrophils-Polys Latest Ref Range: 44.00 - 72.00 % 65.70   Neutrophils (Absolute) Latest Ref Range: 2.00 - 7.15 K/uL 2.67   Lymphocytes Latest Ref Range: 22.00 - 41.00 % 23.30   Lymphs (Absolute) Latest Ref Range: 1.00 - 4.80 K/uL 0.95 (L)   Monocytes Latest Ref Range: 0.00 - 13.40 % 9.10   Monos (Absolute) Latest Ref Range: 0.00 - 0.85 K/uL 0.37   Eosinophils Latest Ref Range: 0.00 - 6.90 % 1.00   Eos (Absolute)  Latest Ref Range: 0.00 - 0.51 K/uL 0.04   Basophils Latest Ref Range: 0.00 - 1.80 % 0.70   Baso (Absolute) Latest Ref Range: 0.00 - 0.12 K/uL 0.03   Immature Granulocytes Latest Ref Range: 0.00 - 0.90 % 0.20   Immature Granulocytes (abs) Latest Ref Range: 0.00 - 0.11 K/uL 0.01   Nucleated RBC Latest Units: /100 WBC 0.00   NRBC (Absolute) Latest Units: K/uL 0.00   Sodium Latest Ref Range: 135 - 145 mmol/L 137   Potassium Latest Ref Range: 3.6 - 5.5 mmol/L 3.8   Chloride Latest Ref Range: 96 - 112 mmol/L 105   Co2 Latest Ref Range: 20 - 33 mmol/L 20   Anion Gap Latest Ref Range: 7.0 - 16.0  12.0   Glucose Latest Ref Range: 65 - 99 mg/dL 100 (H)   Bun Latest Ref Range: 8 - 22 mg/dL 10   Creatinine Latest Ref Range: 0.50 - 1.40 mg/dL 0.85   GFR If  Latest Ref Range: >60 mL/min/1.73 m 2 >60   GFR If Non  Latest Ref Range: >60 mL/min/1.73 m 2 >60   Calcium Latest Ref Range: 8.5 - 10.5 mg/dL 9.3   AST(SGOT) Latest Ref Range: 12 - 45 U/L 23   ALT(SGPT) Latest Ref Range: 2 - 50 U/L 22   Alkaline Phosphatase Latest Ref Range: 30 - 99 U/L 45   Total Bilirubin Latest Ref Range: 0.1 - 1.5 mg/dL 1.6 (H)   Albumin Latest Ref Range: 3.2 - 4.9 g/dL 4.2   Total Protein Latest Ref Range: 6.0 - 8.2 g/dL 6.7   Globulin Latest Ref Range: 1.9 - 3.5 g/dL 2.5   A-G Ratio Latest Units: g/dL 1.7   Cholesterol,Tot Latest Ref Range: 100 - 199 mg/dL 180   Triglycerides Latest Ref Range: 0 - 149 mg/dL 58   HDL Latest Ref Range: >=40 mg/dL 84   LDL Latest Ref Range: <100 mg/dL 84   TSH Latest Ref Range: 0.380 - 5.330 uIU/mL 2.210         Assessment and Plan. The following treatment and monitoring plan is recommended:     1. Medicare annual wellness visit, subsequent     2. Asymptomatic postmenopausal status  DS-BONE DENSITY STUDY ([facility])   3.  Discussed early prediabetes      Services suggested: No services needed at this time  Health Care Screening: Age-appropriate preventive services Medicare covers discussed today  and ordered if indicated.  Referrals offered: Community-based lifestyle interventions to reduce health risks and promote self-management and wellness, fall prevention, nutrition, physical activity, tobacco-use cessation, weight loss, and mental health services as per orders if indicated.    Discussion today about general wellness and lifestyle habits:    · Prevent falls and reduce trip hazards; Cautioned about securing or removing rugs.  · Have a working fire alarm and carbon monoxide detector;   · Engage in regular physical activity and social activities

## 2021-09-17 NOTE — PATIENT INSTRUCTIONS
Advance Directive    Advance directives are legal documents that let you make choices ahead of time about your health care and medical treatment in case you become unable to communicate for yourself. Advance directives are a way for you to communicate your wishes to family, friends, and health care providers. This can help convey your decisions about end-of-life care if you become unable to communicate.  Discussing and writing advance directives should happen over time rather than all at once. Advance directives can be changed depending on your situation and what you want, even after you have signed the advance directives.  If you do not have an advance directive, some states assign family decision makers to act on your behalf based on how closely you are related to them. Each state has its own laws regarding advance directives. You may want to check with your health care provider, , or state representative about the laws in your state. There are different types of advance directives, such as:  · Medical power of .  · Living will.  · Do not resuscitate (DNR) or do not attempt resuscitation (DNAR) order.  Health care proxy and medical power of   A health care proxy, also called a health care agent, is a person who is appointed to make medical decisions for you in cases in which you are unable to make the decisions yourself. Generally, people choose someone they know well and trust to represent their preferences. Make sure to ask this person for an agreement to act as your proxy. A proxy may have to exercise judgment in the event of a medical decision for which your wishes are not known.  A medical power of  is a legal document that names your health care proxy. Depending on the laws in your state, after the document is written, it may also need to be:  · Signed.  · Notarized.  · Dated.  · Copied.  · Witnessed.  · Incorporated into your medical record.  You may also want to appoint  someone to manage your financial affairs in a situation in which you are unable to do so. This is called a durable power of  for finances. It is a separate legal document from the durable power of  for health care. You may choose the same person or someone different from your health care proxy to act as your agent in financial matters.  If you do not appoint a proxy, or if there is a concern that the proxy is not acting in your best interests, a court-appointed guardian may be designated to act on your behalf.  Living will  A living will is a set of instructions documenting your wishes about medical care when you cannot express them yourself. Health care providers should keep a copy of your living will in your medical record. You may want to give a copy to family members or friends. To alert caregivers in case of an emergency, you can place a card in your wallet to let them know that you have a living will and where they can find it. A living will is used if you become:  · Terminally ill.  · Incapacitated.  · Unable to communicate or make decisions.  Items to consider in your living will include:  · The use or non-use of life-sustaining equipment, such as dialysis machines and breathing machines (ventilators).  · A DNR or DNAR order, which is the instruction not to use cardiopulmonary resuscitation (CPR) if breathing or heartbeat stops.  · The use or non-use of tube feeding.  · Withholding of food and fluids.  · Comfort (palliative) care when the goal becomes comfort rather than a cure.  · Organ and tissue donation.  A living will does not give instructions for distributing your money and property if you should pass away. It is recommended that you seek the advice of a  when writing a will. Decisions about taxes, beneficiaries, and asset distribution will be legally binding. This process can relieve your family and friends of any concerns surrounding disputes or questions that may come up about  the distribution of your assets.  DNR or DNAR  A DNR or DNAR order is a request not to have CPR in the event that your heart stops beating or you stop breathing. If a DNR or DNAR order has not been made and shared, a health care provider will try to help any patient whose heart has stopped or who has stopped breathing. If you plan to have surgery, talk with your health care provider about how your DNR or DNAR order will be followed if problems occur.  Summary  · Advance directives are the legal documents that allow you to make choices ahead of time about your health care and medical treatment in case you become unable to communicate for yourself.  · The process of discussing and writing advance directives should happen over time. You can change the advance directives, even after you have signed them.  · Advance directives include DNR or DNAR orders, living adams, and designating an agent as your medical power of .  This information is not intended to replace advice given to you by your health care provider. Make sure you discuss any questions you have with your health care provider.  Document Released: 03/26/2009 Document Revised: 01/22/2020 Document Reviewed: 11/06/2017  [employer] Patient Education © 2020 [employer]

## 2021-09-28 ENCOUNTER — OFFICE VISIT (OUTPATIENT)
Dept: CARDIOLOGY | Facility: MEDICAL CENTER | Age: 76
End: 2021-09-28
Payer: MEDICARE

## 2021-09-28 VITALS
SYSTOLIC BLOOD PRESSURE: 110 MMHG | DIASTOLIC BLOOD PRESSURE: 68 MMHG | HEART RATE: 79 BPM | HEIGHT: 66 IN | RESPIRATION RATE: 18 BRPM | BODY MASS INDEX: 20.25 KG/M2 | WEIGHT: 126 LBS | OXYGEN SATURATION: 96 %

## 2021-09-28 DIAGNOSIS — I47.10 SVT (SUPRAVENTRICULAR TACHYCARDIA) (HCC): ICD-10-CM

## 2021-09-28 PROCEDURE — 99213 OFFICE O/P EST LOW 20 MIN: CPT | Performed by: INTERNAL MEDICINE

## 2021-09-28 PROCEDURE — 93000 ELECTROCARDIOGRAM COMPLETE: CPT | Performed by: INTERNAL MEDICINE

## 2021-09-28 ASSESSMENT — FIBROSIS 4 INDEX: FIB4 SCORE: 1.59

## 2021-09-28 NOTE — PROGRESS NOTES
Arrhythmia Clinic Note (Established patient)    DOS: 9/28/2021    Chief complaint/Reason for consult: AT    Interval History:  Pt is a 75 yo F. History of R sided AT, s/p prior ablation maintained on BB. Here for follow up of SVT and HTN. No changes today. 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 Lost some weight as her appetite has not been great. Started on low dose lexapro and that has been helping.    ROS (+ highlighted in BOLD):  General-- fatigue, weight loss or weight gain  Cardiovascular-- CP, orthopnea, PND    Past Medical History:   Diagnosis Date   • Allergy    • Benign microscopic hematuria 2/9/2017   • DDD (degenerative disc disease), lumbar 5/4/2015   • Glaucoma    • Hiatal hernia    • IBS (irritable bowel syndrome)    • Osteopenia    • PAC (premature atrial contraction)    • Rosacea    • Seasonal allergies 5/18/2021   • SVT (supraventricular tachycardia) ([facility]) 2/9/2015   • Ulcer 2004       Past Surgical History:   Procedure Laterality Date   • COLONOSCOPY  2003    recheck 10 years   • ABDOMINAL HYSTERECTOMY TOTAL  1987   • BUNIONECTOMY      x2       Social History     Socioeconomic History   • Marital status:      Spouse name: Not on file   • Number of children: 1   • Years of education: Not on file   • Highest education level: Not on file   Occupational History   • Not on file   Tobacco Use   • Smoking status: Never Smoker   • Smokeless tobacco: Never Used   Vaping Use   • Vaping Use: Never used   Substance and Sexual Activity   • Alcohol use: Yes     Comment: rare   • Drug use: No   • Sexual activity: Not on file     Comment: , 1child   Other Topics Concern   • Not on file   Social History Narrative    Retired      1 child     Social Determinants of Health     Financial Resource Strain:    • Difficulty of Paying Living Expenses:    Food Insecurity:    • Worried About Running Out of Food in the Last Year:    • Ran Out of Food in the  Last Year:    Transportation Needs:    • Lack of Transportation (Medical):    • Lack of Transportation (Non-Medical):    Physical Activity:    • Days of Exercise per Week:    • Minutes of Exercise per Session:    Stress:    • Feeling of Stress :    Social Connections:    • Frequency of Communication with Friends and Family:    • Frequency of Social Gatherings with Friends and Family:    • Attends [employer] Services:    • Active Member of Clubs or Organizations:    • Attends Club or Organization Meetings:    • Marital Status:    Intimate Partner Violence:    • Fear of Current or Ex-Partner:    • Emotionally Abused:    • Physically Abused:    • Sexually Abused:        Family History   Problem Relation Age of Onset   • Cancer Mother         uterine   • Hypertension Mother    • Heart Disease Mother         arrythmia   • Cancer Father    • Cancer Maternal Aunt         breast       Allergies   Allergen Reactions   • Asa [Aspirin] Vomiting   • Shellfish Allergy Vomiting     diarrhea       Current Outpatient Medications   Medication Sig Dispense Refill   • irbesartan (AVAPRO) 75 MG tablet Take 1 Tablet by mouth every day. 90 Tablet 3   • escitalopram (LEXAPRO) 10 MG Tab Take 1 Tablet by mouth every day. 90 Tablet 3   • omeprazole (PRILOSEC) 20 MG delayed-release capsule TAKE 1 CAPSULE BY MOUTH EVERY DAY 90 capsule 0   • fluticasone (FLONASE) 50 MCG/ACT nasal spray Administer 2 Sprays into affected nostril(S) every day. 9.9 mL 6   • estradiol (ESTRACE) 0.5 MG tablet TAKE 1 TABLET BY MOUTH DAILY 90 tablet 3   • metoprolol (LOPRESSOR) 25 MG Tab Take 1 Tab by mouth 2 times a day. 180 Tab 3   • hyoscyamine (LEVSIN) 0.125 MG tablet Take 1 Tab by mouth every 6 hours as needed for Cramping. 30 Tab 1   • FINACEA 15 % Gel 1 APPLICATION APPLY ON THE SKIN TWICE A DAY  6   • TRAVATAN Z 0.004 % Solution INSTILL 1 GTT IN OU HS  3   • metronidazole (METROLOTION) 0.75 % Lotion 1 APPLICATION APPLY ON THE SKIN TWICE A DAY  6   • PATADAY 0.2 %  "Solution INSTILL 1 DROP IN BOTH EYES DAILY  4   • LUTEIN PO Take 1 Tab by mouth every day.     • Calcium Carbonate-Vitamin D (CALCIUM + D PO) Take 1 Tab by mouth every day.     • Omega-3 Fatty Acids (FISH OIL) 1000 MG CAPS capsule Take 1,000 mg by mouth every day.     • doxycycline (VIBRAMYCIN) 100 MG Tab Take 1 Tablet by mouth 2 times a day. (Patient not taking: Reported on 9/28/2021) 20 Tablet 0     No current facility-administered medications for this visit.       Physical Exam:  Vitals:    09/28/21 0923   BP: 110/68   BP Location: Left arm   Patient Position: Sitting   BP Cuff Size: Adult   Pulse: 79   Resp: 18   SpO2: 96%   Weight: 57.2 kg (126 lb)   Height: 1.664 m (5' 5.5\")     General appearance: NAD, conversant  HEENT: PERRL, neck is supple with FROM  Lungs: Clear to auscultation, normal respiratory effort  CV: RRR, no murmurs/rubs/gallops, no JVD  Abdomen: Soft, non-tender with normal bowel sounds  Extremities: No peripheral edema, no clubbing or cyanosis  Skin: No rash, lesions, or ulcers  Psych: Alert and oriented to person, place and time    Data:  Labs reviewed    EKG interpreted by me:  NSR    Impression/Plan:  1. SVT (supraventricular tachycardia) (HCC)  EKG     -Doing well from cardiac standpoint  -Continue moderate dose BB as she seems to be tolerating fine and AT foci under control  -F/u in 12 mo    Shalom Crockett MD    "

## 2021-09-30 ENCOUNTER — NON-PROVIDER VISIT (OUTPATIENT)
Dept: INTERNAL MEDICINE | Facility: IMAGING CENTER | Age: 76
End: 2021-09-30
Payer: MEDICARE

## 2021-09-30 DIAGNOSIS — Z23 NEED FOR VACCINATION: ICD-10-CM

## 2021-09-30 PROCEDURE — G0008 ADMIN INFLUENZA VIRUS VAC: HCPCS | Performed by: FAMILY MEDICINE

## 2021-09-30 PROCEDURE — 90662 IIV NO PRSV INCREASED AG IM: CPT | Performed by: FAMILY MEDICINE

## 2021-10-05 DIAGNOSIS — Z12.31 ENCOUNTER FOR SCREENING MAMMOGRAM FOR BREAST CANCER: ICD-10-CM

## 2021-10-05 LAB — EKG IMPRESSION: NORMAL

## 2021-10-29 ENCOUNTER — OFFICE VISIT (OUTPATIENT)
Dept: URGENT CARE | Facility: CLINIC | Age: 76
End: 2021-10-29
Payer: MEDICARE

## 2021-10-29 VITALS
HEIGHT: 65 IN | BODY MASS INDEX: 21.23 KG/M2 | WEIGHT: 127.4 LBS | DIASTOLIC BLOOD PRESSURE: 60 MMHG | TEMPERATURE: 97.3 F | HEART RATE: 98 BPM | RESPIRATION RATE: 18 BRPM | SYSTOLIC BLOOD PRESSURE: 110 MMHG | OXYGEN SATURATION: 96 %

## 2021-10-29 DIAGNOSIS — L50.9 URTICARIA: ICD-10-CM

## 2021-10-29 PROCEDURE — 99203 OFFICE O/P NEW LOW 30 MIN: CPT | Performed by: EMERGENCY MEDICINE

## 2021-10-29 RX ORDER — PREDNISONE 20 MG/1
40 TABLET ORAL DAILY
Qty: 10 TABLET | Refills: 0 | Status: SHIPPED | OUTPATIENT
Start: 2021-10-29 | End: 2022-02-18

## 2021-10-29 ASSESSMENT — FIBROSIS 4 INDEX: FIB4 SCORE: 1.59

## 2021-10-29 ASSESSMENT — ENCOUNTER SYMPTOMS
RHINORRHEA: 0
FEVER: 0

## 2021-10-29 NOTE — PROGRESS NOTES
"Subjective     Jaun Susanna Nichols is a 76 y.o. female who presents with Rash (started on thigh and it has spread)            Rash  This is a new problem. The current episode started more than 1 month ago. The problem has been waxing and waning since onset. The affected locations include the left upper leg, right upper leg and back. The rash is characterized by swelling, redness and itchiness. It is unknown if there was an exposure to a precipitant. Pertinent negatives include no fever or rhinorrhea. Past treatments include antihistamine. The treatment provided mild relief. Her past medical history is significant for allergies.   Notes gradually worsening vacillating red spots that are itchy started on left lateral hip area.  Notes also lower back, right hip.  Also notes itchiness to right thumb at site of prior infection.  New medication of citalopram preceding symptoms.  Scheduled for PCP visit in 2 days.    Review of Systems   Constitutional: Negative for fever.   HENT: Negative for rhinorrhea.    Skin: Positive for rash.              Objective     /60 (BP Location: Right arm, Patient Position: Sitting, BP Cuff Size: Adult)   Pulse 98   Temp 36.3 °C (97.3 °F) (Temporal)   Resp 18   Ht 1.651 m (5' 5\")   Wt 57.8 kg (127 lb 6.4 oz)   SpO2 96%   BMI 21.20 kg/m²      Physical Exam  Constitutional:       General: She is not in acute distress.  Eyes:      Conjunctiva/sclera: Conjunctivae normal.   Pulmonary:      Effort: Pulmonary effort is normal.   Skin:     General: Skin is warm and dry.      Findings: No rash.      Comments: No current visible rash at sites of pruritus.   Neurological:      Mental Status: She is alert.   Psychiatric:         Behavior: Behavior is cooperative.                             Assessment & Plan        1. Urticaria  Advised of suspicion for reaction to citalopram.  Recommended decrease to every other day dosage; discuss cessation with PCP.  Advised loose fitting " garments.  Recommended OTC cetirizine as needed itch.  Add if necessary:  - predniSONE (DELTASONE) 20 MG Tab; Take 2 Tablets by mouth every day.  Dispense: 10 Tablet; Refill: 0

## 2021-10-29 NOTE — PATIENT INSTRUCTIONS
Cetirizine capsules  What is this medicine?  CETIRIZINE (se TI ra jossie) is an antihistamine. This medicine is used to treat or prevent symptoms of allergies.  This medicine may be used for other purposes; ask your health care provider or pharmacist if you have questions.  COMMON BRAND NAME(S): Zyrtec Liquid Gel  What should I tell my health care provider before I take this medicine?  They need to know if you have any of these conditions:  · kidney disease  · liver disease  · an unusual or allergic reaction to cetirizine, hydroxyzine, other medicines, foods, dyes, or preservatives  · pregnant or trying to get pregnant  · breast-feeding  How should I use this medicine?  Take this medicine by mouth with a glass of water. Follow the directions on the prescription label. You can take this medicine with food or on an empty stomach. Do not cut, crush or chew this medicine. Take your medicine at regular times. Do not take more often than directed. You may need to take this medicine for several days before your symptoms improve.  Talk to your pediatrician regarding the use of this medicine in children. Special care may be needed. While this drug may be prescribed for children as young as 6 years of age for selected conditions, precautions do apply.  Overdosage: If you think you have taken too much of this medicine contact a poison control center or emergency room at once.  NOTE: This medicine is only for you. Do not share this medicine with others.  What if I miss a dose?  If you miss a dose, take it as soon as you can. If it is almost time for your next dose, take only that dose. Do not take double or extra doses.  What may interact with this medicine?  · alcohol  · certain medicines for anxiety or sleep  · narcotic medicines for pain  · other medicines for colds or allergies  This list may not describe all possible interactions. Give your health care provider a list of all the medicines, herbs, non-prescription drugs, or  dietary supplements you use. Also tell them if you smoke, drink alcohol, or use illegal drugs. Some items may interact with your medicine.  What should I watch for while using this medicine?  Visit your doctor or health care professional for regular checks on your health. Tell your doctor if your symptoms do not improve.  You may get drowsy or dizzy. Do not drive, use machinery, or do anything that needs mental alertness until you know how this medicine affects you. Do not stand or sit up quickly, especially if you are an older patient. This reduces the risk of dizzy or fainting spells.  Your mouth may get dry. Chewing sugarless gum or sucking hard candy, and drinking plenty of water may help. Contact your doctor if the problem does not go away or is severe.  What side effects may I notice from receiving this medicine?  Side effects that you should report to your doctor or health care professional as soon as possible:  · allergic reactions like skin rash, itching or hives, swelling of the face, lips, or tongue  · changes in vision or hearing  · fast or irregular heartbeat  · trouble passing urine or change in the amount of urine  Side effects that usually do not require medical attention (report to your doctor or health care professional if they continue or are bothersome):  · dizziness  · dry mouth  · irritability  · sore throat  · stomach pain  · tiredness  This list may not describe all possible side effects. Call your doctor for medical advice about side effects. You may report side effects to FDA at 9-138-FDA-2818.  Where should I keep my medicine?  Keep out of the reach of children.  Store at room temperature between 20 and 25 degrees C (68 and 77 degrees F). Throw away any unused medicine after the expiration date.  NOTE: This sheet is a summary. It may not cover all possible information. If you have questions about this medicine, talk to your doctor, pharmacist, or health care provider.  © 2020 Elsevier/Gold  Standard (2017-01-19 09:47:56)  Hives  Hives (urticaria) are itchy, red, swollen areas on the skin. Hives can appear on any part of the body. Hives often fade within 24 hours (acute hives). Sometimes, new hives appear after old ones fade and the cycle can continue for several days or weeks (chronic hives). Hives do not spread from person to person (are not contagious).  Hives come from the body's reaction to something a person is allergic to (allergen), something that causes irritation, or various other triggers. When a person is exposed to a trigger, his or her body releases a chemical (histamine) that causes redness, itching, and swelling. Hives can appear right after exposure to a trigger or hours later.  What are the causes?  This condition may be caused by:  · Allergies to foods or ingredients.  · Insect bites or stings.  · Exposure to pollen or pets.  · Contact with latex or chemicals.  · Spending time in sunlight, heat, or cold (exposure).  · Exercise.  · Stress.  · Certain medicines.  You can also get hives from other medical conditions and treatments, such as:  · Viruses, including the common cold.  · Bacterial infections, such as urinary tract infections and strep throat.  · Certain medicines.  · Allergy shots.  · Blood transfusions.  Sometimes, the cause of this condition is not known (idiopathic hives).  What increases the risk?  You are more likely to develop this condition if you:  · Are a woman.  · Have food allergies, especially to citrus fruits, milk, eggs, peanuts, tree nuts, or shellfish.  · Are allergic to:  ? Medicines.  ? Latex.  ? Insects.  ? Animals.  ? Pollen.  What are the signs or symptoms?  Common symptoms of this condition include raised, itchy, red or white bumps or patches on your skin. These areas may:  · Become large and swollen (welts).  · Change in shape and location, quickly and repeatedly.  · Be separate hives or connect over a large area of skin.  · Sting or become  painful.  · Turn white when pressed in the center (tonya).  In severe cases, your hands, feet, and face may also become swollen. This may occur if hives develop deeper in your skin.  How is this diagnosed?  This condition may be diagnosed by your symptoms, medical history, and physical exam.  · Your skin, urine, or blood may be tested to find out what is causing your hives and to rule out other health issues.  · Your health care provider may also remove a small sample of skin from the affected area and examine it under a microscope (biopsy).  How is this treated?  Treatment for this condition depends on the cause and severity of your symptoms. Your health care provider may recommend using cool, wet cloths (cool compresses) or taking cool showers to relieve itching. Treatment may include:  · Medicines that help:  ? Relieve itching (antihistamines).  ? Reduce swelling (corticosteroids).  ? Treat infection (antibiotics).  · An injectable medicine (omalizumab). Your health care provider may prescribe this if you have chronic idiopathic hives and you continue to have symptoms even after treatment with antihistamines.  Severe cases may require an emergency injection of adrenaline (epinephrine) to prevent a life-threatening allergic reaction (anaphylaxis).  Follow these instructions at home:  Medicines  · Take and apply over-the-counter and prescription medicines only as told by your health care provider.  · If you were prescribed an antibiotic medicine, take it as told by your health care provider. Do not stop using the antibiotic even if you start to feel better.  Skin care  · Apply cool compresses to the affected areas.  · Do not scratch or rub your skin.  General instructions  · Do not take hot showers or baths. This can make itching worse.  · Do not wear tight-fitting clothing.  · Use sunscreen and wear protective clothing when you are outside.  · Avoid any substances that cause your hives. Keep a journal to help  track what causes your hives. Write down:  ? What medicines you take.  ? What you eat and drink.  ? What products you use on your skin.  · Keep all follow-up visits as told by your health care provider. This is important.  Contact a health care provider if:  · Your symptoms are not controlled with medicine.  · Your joints are painful or swollen.  Get help right away if:  · You have a fever.  · You have pain in your abdomen.  · Your tongue or lips are swollen.  · Your eyelids are swollen.  · Your chest or throat feels tight.  · You have trouble breathing or swallowing.  These symptoms may represent a serious problem that is an emergency. Do not wait to see if the symptoms will go away. Get medical help right away. Call your local emergency services (911 in the [place] Do not drive yourself to the hospital.  Summary  · Hives (urticaria) are itchy, red, swollen areas on your skin. Hives come from the body's reaction to something a person is allergic to (allergen), something that causes irritation, or various other triggers.  · Treatment for this condition depends on the cause and severity of your symptoms.  · Avoid any substances that cause your hives. Keep a journal to help track what causes your hives.  · Take and apply over-the-counter and prescription medicines only as told by your health care provider.  · Keep all follow-up visits as told by your health care provider. This is important.  This information is not intended to replace advice given to you by your health care provider. Make sure you discuss any questions you have with your health care provider.  Document Released: 12/18/2006 Document Revised: 07/03/2019 Document Reviewed: 07/03/2019  Elsevier Patient Education © 2020 Elsevier Inc.

## 2021-11-01 ENCOUNTER — OFFICE VISIT (OUTPATIENT)
Dept: INTERNAL MEDICINE | Facility: IMAGING CENTER | Age: 76
End: 2021-11-01
Payer: MEDICARE

## 2021-11-01 VITALS
SYSTOLIC BLOOD PRESSURE: 128 MMHG | RESPIRATION RATE: 14 BRPM | TEMPERATURE: 98.1 F | DIASTOLIC BLOOD PRESSURE: 70 MMHG | OXYGEN SATURATION: 97 % | BODY MASS INDEX: 20.87 KG/M2 | HEIGHT: 66 IN | HEART RATE: 80 BPM

## 2021-11-01 DIAGNOSIS — L50.8 CHRONIC URTICARIA: Primary | ICD-10-CM

## 2021-11-01 DIAGNOSIS — J34.89 NASAL SORE: ICD-10-CM

## 2021-11-01 DIAGNOSIS — L30.9 ECZEMA, UNSPECIFIED TYPE: ICD-10-CM

## 2021-11-01 DIAGNOSIS — F41.9 ANXIETY: ICD-10-CM

## 2021-11-01 DIAGNOSIS — R92.2 DENSE BREASTS: ICD-10-CM

## 2021-11-01 DIAGNOSIS — R92.30 DENSE BREASTS: ICD-10-CM

## 2021-11-01 DIAGNOSIS — N95.1 HOT FLASHES, MENOPAUSAL: ICD-10-CM

## 2021-11-01 PROCEDURE — 99215 OFFICE O/P EST HI 40 MIN: CPT | Performed by: FAMILY MEDICINE

## 2021-11-01 RX ORDER — VENLAFAXINE HYDROCHLORIDE 37.5 MG/1
37.5 CAPSULE, EXTENDED RELEASE ORAL DAILY
Qty: 30 CAPSULE | Refills: 3 | Status: SHIPPED | OUTPATIENT
Start: 2021-11-01 | End: 2022-02-28

## 2021-11-02 NOTE — PROGRESS NOTES
"Chief Complaint   Patient presents with   • Rash   • Cold Sores     nose       Subjective:     HPI:   Heriberto Nichols is a 76 y.o. female here to discuss the evaluation and management of:     1.  Rash-intermittent, very itchy rash that started in September  She went to the urgent care a couple of days ago and it was suggested that it could have been the Escitalopram.  It is unclear whether the rash had already been going on but it certainly has been more persistent    The rash is spots that will come and go even within a day    She does report no new products or foods  Known shellfish allergy as well as seasonal allergies            2.  Nasal sore-she reports that she has had inflammation in her left nostril and it is tender-no associated numbness or tingling, no shooting pain      3.  Anxiety that was well controlled on Escitalopram-prescribed for anxiety on September 3 and she does notice an increase since she has started to taper it as per suggestion from the urgent care doc      4.  Hot flashes-has been tapering estrogen and has noticed hot flashes worsening over time now    5.  Dense breasts on mammogram-she received a notice about getting an ultrasound from [facility] where she gets her mammograms done          No problems updated.     Objective:     /70   Pulse 80   Temp 36.7 °C (98.1 °F)   Resp 14   Ht 1.664 m (5' 5.51\")   SpO2 97%  Body mass index is 20.87 kg/m².    Physical Exam:  Physical Exam  Constitutional: Well-developed and well-nourished. Not diaphoretic. No distress.   Skin: Skin is warm and dry. No rash noted today.  Head: Atraumatic without lesions.  Eyes: Conjunctivae and extraocular motions are normal.   Ears:  External ears unremarkable.    Nose: Nares patent.  Moderate inflammation lower left nostril no evidence of cellulitis. No discharge. No facial tenderness.     Neck: Supple      Chest: Effort normal.        Extremities: No cyanosis, clubbing, erythema, nor edema. "   Neurological: Alert and oriented x 3.    Psychiatric:  Behavior, mood, and affect are appropriate     Assessment and Plan:     The following treatment plan was discussed:     No problem-specific Assessment & Plan notes found for this encounter.    1. Chronic urticaria   extensive discussion about possible etiologies  Agree with continuing to taper Escitalopram  Over the next week to see if symptoms improve    Meanwhile recommend she see her dermatologist and follow-up if symptoms do not improve    We will try to avoid prednisone as she already has anxiety     2.       . Nasal sore   Rx written for Bactroban ointment  Likely a staph infection   4. Hot flashes, menopausal   trial of Effexor since she is off estrogen nail   5. Dense breasts  US-SCREENING WHOLE BREAST BILATERAL (3D SCREENING)    CANCELED: US-SCREENING WHOLE BREAST UNILATERAL (3D SCREENING)   6. Anxiety   we will start a trial of Effexor as she is tapering off Escitalopram, see #4   \    Also discussed challenges with 's feeding tubes    Any change or worsening of signs or symptoms, patient encouraged to follow-up or report to emergency room for further evaluation. Patient verbalizes understanding and agrees.    Follow-Up: No follow-ups on file.      PLEASE NOTE: This dictation was created using voice recognition software. I have made every reasonable attempt to correct obvious errors, but I expect that there are errors of grammar and possibly content that I did not discover before finalizing the note.    My total time spent caring for the patient on the day of the encounter was  greater than 40 minutes.   This includes obtaining history, reviewing chart, physical exam, patient education, reviewing outside records, placing orders, interpreting tests and coordinating care.

## 2021-11-24 DIAGNOSIS — K58.9 IRRITABLE BOWEL SYNDROME, UNSPECIFIED TYPE: ICD-10-CM

## 2021-11-24 RX ORDER — HYOSCYAMINE SULFATE 0.125 MG
125 TABLET ORAL EVERY 6 HOURS PRN
Qty: 30 TABLET | Refills: 1 | Status: SHIPPED | OUTPATIENT
Start: 2021-11-24 | End: 2021-12-06

## 2021-12-06 DIAGNOSIS — K58.9 IRRITABLE BOWEL SYNDROME, UNSPECIFIED TYPE: ICD-10-CM

## 2021-12-06 RX ORDER — HYOSCYAMINE SULFATE 0.125 MG
125 TABLET ORAL EVERY 6 HOURS PRN
Qty: 30 TABLET | Refills: 1 | Status: SHIPPED | OUTPATIENT
Start: 2021-12-06 | End: 2023-02-07 | Stop reason: SDUPTHER

## 2021-12-29 RX ORDER — OMEPRAZOLE 20 MG/1
20 CAPSULE, DELAYED RELEASE ORAL
Qty: 90 CAPSULE | Refills: 0 | Status: SHIPPED | OUTPATIENT
Start: 2021-12-29 | End: 2022-03-21 | Stop reason: SDUPTHER

## 2022-02-18 ENCOUNTER — OFFICE VISIT (OUTPATIENT)
Dept: INTERNAL MEDICINE | Facility: IMAGING CENTER | Age: 77
End: 2022-02-18
Payer: MEDICARE

## 2022-02-18 VITALS
HEIGHT: 66 IN | RESPIRATION RATE: 17 BRPM | WEIGHT: 127.43 LBS | OXYGEN SATURATION: 97 % | SYSTOLIC BLOOD PRESSURE: 126 MMHG | HEART RATE: 80 BPM | DIASTOLIC BLOOD PRESSURE: 70 MMHG | BODY MASS INDEX: 20.48 KG/M2 | TEMPERATURE: 98.1 F

## 2022-02-18 DIAGNOSIS — I10 PRIMARY HYPERTENSION: ICD-10-CM

## 2022-02-18 DIAGNOSIS — Z02.9 ADMINISTRATIVE ENCOUNTER: ICD-10-CM

## 2022-02-18 DIAGNOSIS — N95.1 HOT FLASHES DUE TO MENOPAUSE: ICD-10-CM

## 2022-02-18 DIAGNOSIS — I47.10 SVT (SUPRAVENTRICULAR TACHYCARDIA) (HCC): ICD-10-CM

## 2022-02-18 PROCEDURE — 99214 OFFICE O/P EST MOD 30 MIN: CPT | Performed by: FAMILY MEDICINE

## 2022-02-18 ASSESSMENT — FIBROSIS 4 INDEX: FIB4 SCORE: 1.59

## 2022-02-18 NOTE — ASSESSMENT & PLAN NOTE
Still experiencing hot flashes  Patient may increase to 75 mg daily if hot flashes become intolerable

## 2022-02-18 NOTE — PROGRESS NOTES
"Chief Complaint   Patient presents with   • Paperwork     DMV paperwork         Subjective:     HPI:   Heriberto Nichols is a 76 y.o. female here to discuss the evaluation and management of:         Problem   Hot Flashes Due to Menopause    We stopped the estrogen and started a trial of Effexor XR, low-dose     SVT (supraventricular tachycardia) (HCC)    Patient reports that she has had no symptomatic episodes of SVT for a very long time.  Status post ablation.  She continues on low-dose beta-blocker       Hypertension    Denies complaints ,doing well on meds          Objective:     /70 (BP Location: Left arm, Patient Position: Sitting, BP Cuff Size: Adult)   Pulse 80   Temp 36.7 °C (98.1 °F) (Temporal)   Resp 17   Ht 1.664 m (5' 5.5\")   Wt 57.8 kg (127 lb 6.8 oz)   SpO2 97%  Body mass index is 20.88 kg/m².      Physical Exam  Constitutional: Well-developed and well-nourished. Not diaphoretic. No distress.   Skin: Skin is warm and dry. No rash noted.  Head: Atraumatic without lesions.  Eyes: Conjunctivae and extraocular motions are normal.   Ears:  External ears unremarkable.           Neck: Supple, No thyromegaly present. No JVD     Chest: Effort normal.    Abdomen:  without distention.  .  Extremities: No cyanosis, clubbing, erythema, nor edema.   Neurological: Alert and oriented x 3.    Psychiatric:  Behavior, mood, and affect are appropriate     Assessment and Plan:     The following treatment plan was monitored /researched or discussed:     Hypertension  Very stable on current regimen, no med changes indicated    Hot flashes due to menopause  Still experiencing hot flashes  Patient may increase to 75 mg daily if hot flashes become intolerable    SVT (supraventricular tachycardia) (HCC)  Continue with beta-blocker         4. Administrative encounter-patient was checked and no contraindications to driving   filled out DMV forms                 Any change or worsening of signs or symptoms, patient " encouraged to follow-up or report to emergency room for further evaluation. Patient verbalizes understanding and agrees.    Follow-Up: No follow-ups on file.      PLEASE NOTE: This dictation was created using voice recognition software. I have made every reasonable attempt to correct obvious errors, but I expect that there are errors of grammar and possibly content that I did not discover before finalizing the note.    My total time spent caring for the patient on the day of the encounter was  greater than 30 minutes.   This includes obtaining history, reviewing chart, physical exam, patient education, reviewing outside records, placing orders, interpreting tests and coordinating care.

## 2022-02-28 RX ORDER — VENLAFAXINE HYDROCHLORIDE 37.5 MG/1
37.5 CAPSULE, EXTENDED RELEASE ORAL DAILY
Qty: 90 CAPSULE | Refills: 1 | Status: SHIPPED | OUTPATIENT
Start: 2022-02-28 | End: 2022-05-31 | Stop reason: SDUPTHER

## 2022-03-21 RX ORDER — OMEPRAZOLE 20 MG/1
20 CAPSULE, DELAYED RELEASE ORAL
Qty: 90 CAPSULE | Refills: 3 | Status: SHIPPED | OUTPATIENT
Start: 2022-03-21 | End: 2022-03-28

## 2022-03-28 RX ORDER — OMEPRAZOLE 20 MG/1
20 CAPSULE, DELAYED RELEASE ORAL
Qty: 90 CAPSULE | Refills: 3 | Status: SHIPPED | OUTPATIENT
Start: 2022-03-28 | End: 2022-06-26 | Stop reason: SDUPTHER

## 2022-05-31 RX ORDER — VENLAFAXINE HYDROCHLORIDE 37.5 MG/1
37.5 CAPSULE, EXTENDED RELEASE ORAL DAILY
Qty: 90 CAPSULE | Refills: 3 | Status: SHIPPED | OUTPATIENT
Start: 2022-05-31 | End: 2022-09-30

## 2022-06-26 DIAGNOSIS — K21.9 GASTROESOPHAGEAL REFLUX DISEASE WITHOUT ESOPHAGITIS: ICD-10-CM

## 2022-06-27 RX ORDER — OMEPRAZOLE 20 MG/1
20 CAPSULE, DELAYED RELEASE ORAL
Qty: 90 CAPSULE | Refills: 3 | Status: SHIPPED | OUTPATIENT
Start: 2022-06-27 | End: 2023-08-16

## 2022-08-18 ENCOUNTER — PATIENT MESSAGE (OUTPATIENT)
Dept: INTERNAL MEDICINE | Facility: IMAGING CENTER | Age: 77
End: 2022-08-18
Payer: MEDICARE

## 2022-09-24 DIAGNOSIS — F32.1 CURRENT MODERATE EPISODE OF MAJOR DEPRESSIVE DISORDER, UNSPECIFIED WHETHER RECURRENT (HCC): ICD-10-CM

## 2022-09-24 DIAGNOSIS — I10 PRIMARY HYPERTENSION: ICD-10-CM

## 2022-09-27 ENCOUNTER — NON-PROVIDER VISIT (OUTPATIENT)
Dept: INTERNAL MEDICINE | Facility: IMAGING CENTER | Age: 77
End: 2022-09-27
Payer: MEDICARE

## 2022-09-27 ENCOUNTER — HOSPITAL ENCOUNTER (OUTPATIENT)
Facility: MEDICAL CENTER | Age: 77
End: 2022-09-27
Attending: FAMILY MEDICINE
Payer: MEDICARE

## 2022-09-27 DIAGNOSIS — F32.1 CURRENT MODERATE EPISODE OF MAJOR DEPRESSIVE DISORDER, UNSPECIFIED WHETHER RECURRENT (HCC): ICD-10-CM

## 2022-09-27 DIAGNOSIS — I10 PRIMARY HYPERTENSION: ICD-10-CM

## 2022-09-27 LAB
ALBUMIN SERPL BCP-MCNC: 4.4 G/DL (ref 3.2–4.9)
ALBUMIN/GLOB SERPL: 1.5 G/DL
ALP SERPL-CCNC: 64 U/L (ref 30–99)
ALT SERPL-CCNC: 19 U/L (ref 2–50)
ANION GAP SERPL CALC-SCNC: 10 MMOL/L (ref 7–16)
AST SERPL-CCNC: 26 U/L (ref 12–45)
BASOPHILS # BLD AUTO: 2.1 % (ref 0–1.8)
BASOPHILS # BLD: 0.08 K/UL (ref 0–0.12)
BILIRUB SERPL-MCNC: 1.2 MG/DL (ref 0.1–1.5)
BUN SERPL-MCNC: 22 MG/DL (ref 8–22)
CALCIUM SERPL-MCNC: 9.9 MG/DL (ref 8.5–10.5)
CHLORIDE SERPL-SCNC: 105 MMOL/L (ref 96–112)
CHOLEST SERPL-MCNC: 207 MG/DL (ref 100–199)
CO2 SERPL-SCNC: 26 MMOL/L (ref 20–33)
CREAT SERPL-MCNC: 0.9 MG/DL (ref 0.5–1.4)
EOSINOPHIL # BLD AUTO: 0.1 K/UL (ref 0–0.51)
EOSINOPHIL NFR BLD: 2.6 % (ref 0–6.9)
ERYTHROCYTE [DISTWIDTH] IN BLOOD BY AUTOMATED COUNT: 48.2 FL (ref 35.9–50)
GFR SERPLBLD CREATININE-BSD FMLA CKD-EPI: 65 ML/MIN/1.73 M 2
GLOBULIN SER CALC-MCNC: 3 G/DL (ref 1.9–3.5)
GLUCOSE SERPL-MCNC: 90 MG/DL (ref 65–99)
HCT VFR BLD AUTO: 45.4 % (ref 37–47)
HDLC SERPL-MCNC: 85 MG/DL
HGB BLD-MCNC: 14.8 G/DL (ref 12–16)
IMM GRANULOCYTES # BLD AUTO: 0 K/UL (ref 0–0.11)
IMM GRANULOCYTES NFR BLD AUTO: 0 % (ref 0–0.9)
LDLC SERPL CALC-MCNC: 111 MG/DL
LYMPHOCYTES # BLD AUTO: 1.43 K/UL (ref 1–4.8)
LYMPHOCYTES NFR BLD: 37.2 % (ref 22–41)
MCH RBC QN AUTO: 32.7 PG (ref 27–33)
MCHC RBC AUTO-ENTMCNC: 32.6 G/DL (ref 33.6–35)
MCV RBC AUTO: 100.4 FL (ref 81.4–97.8)
MONOCYTES # BLD AUTO: 0.41 K/UL (ref 0–0.85)
MONOCYTES NFR BLD AUTO: 10.7 % (ref 0–13.4)
NEUTROPHILS # BLD AUTO: 1.82 K/UL (ref 2–7.15)
NEUTROPHILS NFR BLD: 47.4 % (ref 44–72)
NRBC # BLD AUTO: 0 K/UL
NRBC BLD-RTO: 0 /100 WBC
PLATELET # BLD AUTO: 222 K/UL (ref 164–446)
PMV BLD AUTO: 11.1 FL (ref 9–12.9)
POTASSIUM SERPL-SCNC: 4.1 MMOL/L (ref 3.6–5.5)
PROT SERPL-MCNC: 7.4 G/DL (ref 6–8.2)
RBC # BLD AUTO: 4.52 M/UL (ref 4.2–5.4)
SODIUM SERPL-SCNC: 141 MMOL/L (ref 135–145)
TRIGL SERPL-MCNC: 55 MG/DL (ref 0–149)
TSH SERPL DL<=0.005 MIU/L-ACNC: 3.05 UIU/ML (ref 0.38–5.33)
WBC # BLD AUTO: 3.8 K/UL (ref 4.8–10.8)

## 2022-09-27 PROCEDURE — 80061 LIPID PANEL: CPT

## 2022-09-27 PROCEDURE — 84443 ASSAY THYROID STIM HORMONE: CPT

## 2022-09-27 PROCEDURE — 85025 COMPLETE CBC W/AUTO DIFF WBC: CPT

## 2022-09-27 PROCEDURE — 80053 COMPREHEN METABOLIC PANEL: CPT

## 2022-09-30 ENCOUNTER — OFFICE VISIT (OUTPATIENT)
Dept: INTERNAL MEDICINE | Facility: IMAGING CENTER | Age: 77
End: 2022-09-30
Payer: MEDICARE

## 2022-09-30 VITALS
WEIGHT: 138 LBS | RESPIRATION RATE: 14 BRPM | BODY MASS INDEX: 22.18 KG/M2 | SYSTOLIC BLOOD PRESSURE: 130 MMHG | HEIGHT: 66 IN | TEMPERATURE: 97.4 F | DIASTOLIC BLOOD PRESSURE: 72 MMHG | HEART RATE: 79 BPM | OXYGEN SATURATION: 98 %

## 2022-09-30 DIAGNOSIS — Z00.00 MEDICARE ANNUAL WELLNESS VISIT, SUBSEQUENT: Primary | ICD-10-CM

## 2022-09-30 DIAGNOSIS — Z12.31 ENCOUNTER FOR SCREENING MAMMOGRAM FOR BREAST CANCER: ICD-10-CM

## 2022-09-30 DIAGNOSIS — I10 PRIMARY HYPERTENSION: ICD-10-CM

## 2022-09-30 PROCEDURE — G0439 PPPS, SUBSEQ VISIT: HCPCS | Performed by: FAMILY MEDICINE

## 2022-09-30 RX ORDER — IVERMECTIN 10 MG/G
CREAM TOPICAL
COMMUNITY
Start: 2022-08-02

## 2022-09-30 ASSESSMENT — ENCOUNTER SYMPTOMS: GENERAL WELL-BEING: EXCELLENT

## 2022-09-30 ASSESSMENT — ACTIVITIES OF DAILY LIVING (ADL): BATHING_REQUIRES_ASSISTANCE: 0

## 2022-09-30 ASSESSMENT — FIBROSIS 4 INDEX: FIB4 SCORE: 2.12

## 2022-09-30 ASSESSMENT — PATIENT HEALTH QUESTIONNAIRE - PHQ9: CLINICAL INTERPRETATION OF PHQ2 SCORE: 0

## 2022-10-01 NOTE — PROGRESS NOTES
Chief Complaint   Patient presents with    Medicare Annual Wellness    Rash    Nasal Swelling     Right nostril         HPI:  Jaun Nichols is a 77 y.o. here for Medicare Annual Wellness Visit     For the past year she has had an intermittent rash that usually lasts no more than a few hours to a few days  She develops red welts and they are itchy usually in the buttocks area or the hips  She is tried cortisone cream on it and that seems to help    She has no idea what is causing it, no new detergents, no history of skin reactions to anything      Her stress is greatly diminished and has been doing well off of the Effexor   since September 8    She had improvement with her urinary leakage after going to physical therapy but has had a couple of episodes where she leaked quite a bit    She also reports that in 1990 she had jaundice but they never figured out what exactly she had  She recovered after about a month  She had no GI symptoms prior to that          Patient Active Problem List    Diagnosis Date Noted    Hot flashes due to menopause 02/18/2022    Seasonal allergies 05/18/2021    Stress incontinence 05/18/2021    Gastroesophageal reflux disease without esophagitis 08/22/2019    Benign microscopic hematuria 02/09/2017    Cervical stenosis of spinal canal 08/08/2016    DDD (degenerative disc disease), cervical 07/01/2015    DDD (degenerative disc disease), lumbar 05/04/2015    SVT (supraventricular tachycardia) ([facility]) 02/09/2015    History of PUD (peptic ulcer disease) 02/07/2015    Hypertension 06/18/2013    Allergic state     Rosacea     IBS (irritable bowel syndrome)     Osteopenia     Hiatal hernia        Current Outpatient Medications   Medication Sig Dispense Refill    Ivermectin 1 % Cream APPLY 1 APPLICATION ON THE SKIN DAILY APPLY TO THE AFFECTED AREA ONCE DAILY      omeprazole (PRILOSEC) 20 MG delayed-release capsule Take 1 Capsule by mouth every day. 90 Capsule 3    venlafaxine XR (EFFEXOR XR)  37.5 MG CAPSULE SR 24 HR Take 1 Capsule by mouth every day. 90 Capsule 3    hyoscyamine (LEVSIN) 0.125 MG tablet TAKE 1 TABLET BY MOUTH EVERY 6 HOURS AS NEEDED FOR CRAMPING 30 Tablet 1    metoprolol tartrate (LOPRESSOR) 25 MG Tab Take 1 Tablet by mouth 2 times a day. 180 Tablet 3    mupirocin (BACTROBAN) 2 % Ointment Apply 1 Application topically 2 times a day. 22 g 0    irbesartan (AVAPRO) 75 MG tablet Take 1 Tablet by mouth every day. 90 Tablet 3    fluticasone (FLONASE) 50 MCG/ACT nasal spray Administer 2 Sprays into affected nostril(S) every day. 9.9 mL 6    FINACEA 15 % Gel 1 APPLICATION APPLY ON THE SKIN TWICE A DAY  6    TRAVATAN Z 0.004 % Solution INSTILL 1 GTT IN OU HS  3    metronidazole (METROLOTION) 0.75 % Lotion 1 APPLICATION APPLY ON THE SKIN TWICE A DAY  6    PATADAY 0.2 % Solution INSTILL 1 DROP IN BOTH EYES DAILY  4    LUTEIN PO Take 1 Tab by mouth every day.      Calcium Carbonate-Vitamin D (CALCIUM + D PO) Take 1 Tab by mouth every day.      Omega-3 Fatty Acids (FISH OIL) 1000 MG CAPS capsule Take 1,000 mg by mouth every day.       No current facility-administered medications for this visit.            Current supplements as per medication list.       Allergies: Asa [aspirin] and Shellfish allergy    Current social contact/activities: friends/family      She  reports that she has never smoked. She has never used smokeless tobacco. She reports current alcohol use. She reports that she does not use drugs.  Counseling given: Not Answered        DPA/Advanced Directive:  Patient has Advanced Directive on file.       ROS:    Gait: Uses no assistive device   Ostomy: no   Other tubes: no   Amputations: no   Chronic oxygen use: no   Last eye exam:UTD   : Reports urinary leakage during the last 6 months that has not interfered at all with their daily activities or sleep.        Screening:     Depression Screening  Little interest or pleasure in doing things?  0 - not at all  Feeling down, depressed , or  hopeless? 0 - not at all  Patient Health Questionnaire Score: 0     If depressive symptoms identified deferred to follow up visit unless specifically addressed in assessment and plan.    Interpretation of PHQ-9 Total Score   Score Severity   1-4 No Depression   5-9 Mild Depression   10-14 Moderate Depression   15-19 Moderately Severe Depression   20-27 Severe Depression    Screening for Cognitive Impairment  Three Minute Recall (daughter, heaven, mountain) 3/3    Den clock face with all 12 numbers and set the hands to show 10 past 11.  Yes    Cognitive concerns identified deferred for follow up unless specifically addressed in assessment and plan.    Fall Risk Assessment  Has the patient had two or more falls in the last year or any fall with injury in the last year?  No    Safety Assessment  Throw rugs on floor.  Yes  Handrails on all stairs.  Yes  Good lighting in all hallways.  Yes  Difficulty hearing.  No  Patient counseled about all safety risks that were identified.    Functional Assessment ADLs  Are there any barriers preventing you from cooking for yourself or meeting nutritional needs?  No.    Are there any barriers preventing you from driving safely or obtaining transportation?  No.    Are there any barriers preventing you from using a telephone or calling for help?  No.    Are there any barriers preventing you from shopping?  No.    Are there any barriers preventing you from taking care of your own finances?  No.    Are there any barriers preventing you from managing your medications?  No.    Are there any barriers preventing you from showering, bathing or dressing yourself?  No.    Are you currently engaging in any exercise or physical activity?  Yes.     What is your perception of your health?  Excellent.    Advance Care Planning  Do you have an Advance Directive, Living Will, Durable Power of , or POLST? Yes.    Health Maintenance Summary            Overdue - IMM HEP B VACCINE (1 of 3 -  3-dose series) Overdue - never done      No completion history exists for this topic.              Overdue - Annual Wellness Visit (Every 366 Days) Overdue since 9/14/2022 09/13/2021  Level of Service: [place] ANNUAL WELLNESS VISIT-INCLUDES PPPS SUBSEQUE*    09/13/2021  Visit Dx: Medicare annual wellness visit, subsequent    09/13/2020  Subsequent Annual Wellness Visit - Includes PPPS ()    09/10/2020  Visit Dx: Medicare annual wellness visit, subsequent    08/22/2019  Subsequent Annual Wellness Visit - Includes PPPS ()    Only the first 5 history entries have been loaded, but more history exists.              MAMMOGRAM (Yearly) Order placed this encounter      11/10/2021  [id number]    11/13/2020  [id number]    11/12/2019  [id number]    10/17/2018  [id number]    10/11/2017  [id number]    Only the first 5 history entries have been loaded, but more history exists.              COLORECTAL CANCER SCREENING (COLONOSCOPY - Every 10 Years) Next due on 5/21/2023 05/21/2013  COLONOSCOPY (Done - [facility])    05/21/2013  AMB REFERRAL TO GI FOR COLONOSCOPY    06/14/2012  OCCULT BLOOD FECES IMMUNOASSAY              IMM DTaP/Tdap/Td Vaccine (3 - Td or Tdap) Next due on 6/17/2023 06/17/2013  Imm Admin: Tdap Vaccine    01/25/2008  Imm Admin: Tdap Vaccine              BONE DENSITY (Every 5 Years) Next due on 11/10/2026      11/10/2021  DS-BONE DENSITY STUDY (DEXA)    11/12/2019  DS-BONE DENSITY STUDY (DEXA)    10/17/2018  DS-BONE DENSITY STUDY (DEXA)    10/07/2014  Done              IMM PNEUMOCOCCAL VACCINE: 65+ Years (Series Information) Completed      11/04/2014  Imm Admin: Pneumococcal Conjugate Vaccine (Prevnar/PCV-13)    08/01/2014  Imm Admin: Pneumococcal Conjugate Vaccine (Prevnar/PCV-13)    10/01/2008  Imm Admin: Pneumococcal polysaccharide vaccine (PPSV-23)              IMM ZOSTER VACCINES (Series Information) Completed      08/31/2019  Imm Admin:  Zoster Vaccine Recombinant (RZV) (SHINGRIX)    06/12/2018  Imm Admin: Zoster Vaccine Recombinant (RZV) (SHINGRIX)    06/18/2007  Imm Admin: Zoster Vaccine Live (ZVL) (Zostavax) - HISTORICAL DATA              COVID-19 Vaccine (Series Information) Completed      04/15/2022  Imm Admin: PFIZER HARRELL CAP SARS-COV-2 VACCINATION (12+)    10/25/2021  Imm Admin: PFIZER PURPLE CAP SARS-COV-2 VACCINATION (12+)    02/05/2021  Imm Admin: PFIZER PURPLE CAP SARS-COV-2 VACCINATION (12+)    01/14/2021  Imm Admin: PFIZER PURPLE CAP SARS-COV-2 VACCINATION (12+)              IMM INFLUENZA (Series Information) Completed      09/20/2022  Imm Admin: Influenza Vaccine Adult HD    09/30/2021  Imm Admin: Influenza Vaccine Adult HD    09/05/2020  Imm Admin: Influenza (IM) Preservative Free - HISTORICAL DATA    09/28/2019  Imm Admin: Influenza Vaccine Adult HD    09/14/2018  Imm Admin: Influenza Vaccine Adult HD    Only the first 5 history entries have been loaded, but more history exists.              IMM MENINGOCOCCAL ACWY VACCINE (Series Information) Aged Out      No completion history exists for this topic.                    Patient Care Team:  Jayda Garcia M.D. as PCP - General (Family Medicine)  Soco Arzola R.N. as Registered Nurse        Social History     Tobacco Use    Smoking status: Never    Smokeless tobacco: Never   Vaping Use    Vaping Use: Never used   Substance Use Topics    Alcohol use: Yes     Comment: rare    Drug use: No     Family History   Problem Relation Age of Onset    Cancer Mother         uterine    Hypertension Mother     Heart Disease Mother         arrythmia    Cancer Father     Cancer Maternal Aunt         breast     She  has a past medical history of Allergy, Benign microscopic hematuria (2/9/2017), DDD (degenerative disc disease), lumbar (5/4/2015), Glaucoma, Hiatal hernia, IBS (irritable bowel syndrome), Osteopenia, PAC (premature atrial contraction), Rosacea, Seasonal allergies (5/18/2021), SVT  "(supraventricular tachycardia) (HCC) (2/9/2015), and Ulcer (2004).   Past Surgical History:   Procedure Laterality Date    COLONOSCOPY  2003    recheck 10 years    ABDOMINAL HYSTERECTOMY TOTAL  1987    BUNIONECTOMY      x2       Exam:     /72   Pulse 79   Temp 36.3 °C (97.4 °F)   Resp 14   Ht 1.664 m (5' 5.51\")   Wt 62.6 kg (138 lb)   SpO2 98%  Body mass index is 22.61 kg/m².    Hearing good.    Dentition good  Alert, oriented in no acute distress.  Eye contact is good, speech goal directed, affect calm    Physical Exam  Constitutional:       General: She is not in acute distress.     Appearance: Normal appearance.   HENT:      Head: Normocephalic and atraumatic.      Nose: Nose normal.      Mouth/Throat:      Mouth: Mucous membranes are moist.   Eyes:      Conjunctiva/sclera: Conjunctivae normal.   Cardiovascular:      Rate and Rhythm: Normal rate and regular rhythm.   Pulmonary:      Effort: Pulmonary effort is normal.      Breath sounds: Normal breath sounds. No wheezing.   Abdominal:      General: Abdomen is flat.      Palpations: Abdomen is soft.   Musculoskeletal:      Cervical back: No rigidity.      Right lower leg: No edema.      Left lower leg: No edema.   Lymphadenopathy:      Cervical: No cervical adenopathy.   Skin:     Coloration: Skin is not jaundiced.      Findings: No erythema.   Neurological:      General: No focal deficit present.      Mental Status: She is alert and oriented to person, place, and time. Mental status is at baseline.   Psychiatric:         Mood and Affect: Mood normal.         Behavior: Behavior normal.         Thought Content: Thought content normal.         Judgment: Judgment normal.      Assessment and Plan. The following treatment and monitoring plan is recommended:     1. Medicare annual wellness visit, subsequent        2. Encounter for screening mammogram for breast cancer  MA-SCREENING MAMMO BILAT W/TOMOSYNTHESIS W/CAD        3. HTN--stable continue current " regimen  Labs reviewed UTD  Vaccines reviewed      Services suggested: No services needed at this time  Health Care Screening: Age-appropriate preventive services Medicare covers discussed today and ordered if indicated.  Referrals offered: Community-based lifestyle interventions to reduce health risks and promote self-management and wellness, fall prevention, nutrition, physical activity, tobacco-use cessation, weight loss, and mental health services as per orders if indicated.    Discussion today about general wellness and lifestyle habits:    Prevent falls and reduce trip hazards; Cautioned about securing or removing rugs.  Have a working fire alarm and carbon monoxide detector;   Engage in regular physical activity and social activities       Follow-up: No follow-ups on file.

## 2022-10-05 RX ORDER — IRBESARTAN 75 MG/1
75 TABLET ORAL DAILY
Qty: 90 TABLET | Refills: 1 | Status: SHIPPED | OUTPATIENT
Start: 2022-10-05 | End: 2022-11-25 | Stop reason: SDUPTHER

## 2022-10-10 ENCOUNTER — OFFICE VISIT (OUTPATIENT)
Dept: CARDIOLOGY | Facility: MEDICAL CENTER | Age: 77
End: 2022-10-10
Payer: MEDICARE

## 2022-10-10 VITALS
BODY MASS INDEX: 22.5 KG/M2 | HEIGHT: 66 IN | WEIGHT: 140 LBS | HEART RATE: 66 BPM | RESPIRATION RATE: 14 BRPM | DIASTOLIC BLOOD PRESSURE: 66 MMHG | SYSTOLIC BLOOD PRESSURE: 106 MMHG | OXYGEN SATURATION: 98 %

## 2022-10-10 DIAGNOSIS — I47.10 SVT (SUPRAVENTRICULAR TACHYCARDIA) (HCC): ICD-10-CM

## 2022-10-10 PROCEDURE — 99213 OFFICE O/P EST LOW 20 MIN: CPT | Mod: 25 | Performed by: INTERNAL MEDICINE

## 2022-10-10 PROCEDURE — 93000 ELECTROCARDIOGRAM COMPLETE: CPT | Performed by: INTERNAL MEDICINE

## 2022-10-10 ASSESSMENT — FIBROSIS 4 INDEX: FIB4 SCORE: 2.12

## 2022-10-10 NOTE — PROGRESS NOTES
Arrhythmia Clinic Note (Established patient)    DOS: 10/10/2022    Chief complaint/Reason for consult: F/u SVT    Interval History:  Pt is a 76 yo F. She has a history of SVT. S/p prior ablation with Dr. Rdz, targeted AT from tone. Maintained on moderate dose BB. Here for f/u. No complaints today. Still taking BID metop. She has not noted recurrence of her SVT.    ROS (+ highlighted in red):  General--Negative for fatigue, weight loss or weight gain  Cardiovascular--Negative for CP, orthopnea, PND    Past Medical History:   Diagnosis Date    Allergy     Benign microscopic hematuria 2/9/2017    DDD (degenerative disc disease), lumbar 5/4/2015    Glaucoma     Hiatal hernia     IBS (irritable bowel syndrome)     Osteopenia     PAC (premature atrial contraction)     Rosacea     Seasonal allergies 5/18/2021    SVT (supraventricular tachycardia) ([facility]) 2/9/2015    Ulcer 2004       Past Surgical History:   Procedure Laterality Date    COLONOSCOPY  2003    recheck 10 years    ABDOMINAL HYSTERECTOMY TOTAL  1987    BUNIONECTOMY      x2       Social History     Socioeconomic History    Marital status:      Spouse name: Not on file    Number of children: 1    Years of education: Not on file    Highest education level: Not on file   Occupational History    Not on file   Tobacco Use    Smoking status: Never    Smokeless tobacco: Never   Vaping Use    Vaping Use: Never used   Substance and Sexual Activity    Alcohol use: Yes     Comment: rare    Drug use: No    Sexual activity: Not on file     Comment: , 1child   Other Topics Concern    Not on file   Social History Narrative    Retired      1 child     Social Determinants of Health     Financial Resource Strain: Not on file   Food Insecurity: Not on file   Transportation Needs: Not on file   Physical Activity: Not on file   Stress: Not on file   Social Connections: Not on file   Intimate Partner Violence: Not on file   Housing Stability: Not on file  "      Family History   Problem Relation Age of Onset    Cancer Mother         uterine    Hypertension Mother     Heart Disease Mother         arrythmia    Cancer Father     Cancer Maternal Aunt         breast       Allergies   Allergen Reactions    Asa [Aspirin] Vomiting    Shellfish Allergy Vomiting     diarrhea       Current Outpatient Medications   Medication Sig Dispense Refill    metoprolol tartrate (LOPRESSOR) 25 MG Tab TAKE 1 TABLET BY MOUTH TWICE A  Tablet 1    irbesartan (AVAPRO) 75 MG tablet TAKE 1 TABLET BY MOUTH EVERY DAY 90 Tablet 1    Ivermectin 1 % Cream APPLY 1 APPLICATION ON THE SKIN DAILY APPLY TO THE AFFECTED AREA ONCE DAILY      omeprazole (PRILOSEC) 20 MG delayed-release capsule Take 1 Capsule by mouth every day. 90 Capsule 3    hyoscyamine (LEVSIN) 0.125 MG tablet TAKE 1 TABLET BY MOUTH EVERY 6 HOURS AS NEEDED FOR CRAMPING 30 Tablet 1    mupirocin (BACTROBAN) 2 % Ointment Apply 1 Application topically 2 times a day. 22 g 0    fluticasone (FLONASE) 50 MCG/ACT nasal spray Administer 2 Sprays into affected nostril(S) every day. 9.9 mL 6    FINACEA 15 % Gel 1 APPLICATION APPLY ON THE SKIN TWICE A DAY  6    TRAVATAN Z 0.004 % Solution INSTILL 1 GTT IN OU HS  3    metronidazole (METROLOTION) 0.75 % Lotion 1 APPLICATION APPLY ON THE SKIN TWICE A DAY  6    PATADAY 0.2 % Solution INSTILL 1 DROP IN BOTH EYES DAILY  4    LUTEIN PO Take 1 Tab by mouth every day.      Calcium Carbonate-Vitamin D (CALCIUM + D PO) Take 1 Tab by mouth every day.      Omega-3 Fatty Acids (FISH OIL) 1000 MG CAPS capsule Take 1,000 mg by mouth every day.       No current facility-administered medications for this visit.       Physical Exam:  Vitals:    10/10/22 0951   BP: 106/66   BP Location: Left arm   Patient Position: Sitting   BP Cuff Size: Adult   Pulse: 66   Resp: 14   SpO2: 98%   Weight: 63.5 kg (140 lb)   Height: 1.676 m (5' 6\")     General appearance: NAD, conversant  HEENT: PERRL, neck is supple with " FROM  Lungs: Clear to auscultation, normal respiratory effort  CV: RRR, no murmurs/rubs/gallops, no JVD  Abdomen: Soft, non-tender with normal bowel sounds  Extremities: No peripheral edema, no clubbing or cyanosis  Skin: No rash, lesions, or ulcers  Psych: Alert and oriented to person, place and time    Data:    EKG interpreted by me:  NSR    Impression/Plan:  1. SVT (supraventricular tachycardia) (HCC)  EKG        -Doing well  -BP at goal with BB/ARB  -SVT under control with ablation and scheduled BB  -No changes to regimen, f/u in 12 mo    Shalom Crockett MD

## 2022-11-07 LAB — EKG IMPRESSION: NORMAL

## 2022-11-09 ENCOUNTER — PATIENT MESSAGE (OUTPATIENT)
Dept: HEALTH INFORMATION MANAGEMENT | Facility: OTHER | Age: 77
End: 2022-11-09

## 2022-11-09 ENCOUNTER — PATIENT MESSAGE (OUTPATIENT)
Dept: INTERNAL MEDICINE | Facility: IMAGING CENTER | Age: 77
End: 2022-11-09
Payer: MEDICARE

## 2022-11-25 RX ORDER — IRBESARTAN 75 MG/1
75 TABLET ORAL DAILY
Qty: 90 TABLET | Refills: 1 | Status: SHIPPED | OUTPATIENT
Start: 2022-11-25 | End: 2023-08-24

## 2023-02-07 ENCOUNTER — HOSPITAL ENCOUNTER (OUTPATIENT)
Dept: RADIOLOGY | Facility: MEDICAL CENTER | Age: 78
End: 2023-02-07
Attending: FAMILY MEDICINE
Payer: MEDICARE

## 2023-02-07 ENCOUNTER — OFFICE VISIT (OUTPATIENT)
Dept: INTERNAL MEDICINE | Facility: IMAGING CENTER | Age: 78
End: 2023-02-07
Payer: MEDICARE

## 2023-02-07 VITALS
TEMPERATURE: 97.9 F | WEIGHT: 136.69 LBS | HEART RATE: 79 BPM | BODY MASS INDEX: 21.97 KG/M2 | DIASTOLIC BLOOD PRESSURE: 74 MMHG | SYSTOLIC BLOOD PRESSURE: 116 MMHG | RESPIRATION RATE: 14 BRPM | HEIGHT: 66 IN | OXYGEN SATURATION: 99 %

## 2023-02-07 DIAGNOSIS — T14.90XA TRAUMA: ICD-10-CM

## 2023-02-07 DIAGNOSIS — J34.89 NASAL SORE: ICD-10-CM

## 2023-02-07 DIAGNOSIS — M79.672 INFLAMMATORY PAIN OF LEFT HEEL: ICD-10-CM

## 2023-02-07 DIAGNOSIS — K58.9 IRRITABLE BOWEL SYNDROME, UNSPECIFIED TYPE: ICD-10-CM

## 2023-02-07 PROCEDURE — 99215 OFFICE O/P EST HI 40 MIN: CPT | Performed by: FAMILY MEDICINE

## 2023-02-07 PROCEDURE — 73650 X-RAY EXAM OF HEEL: CPT | Mod: LT

## 2023-02-07 PROCEDURE — 73590 X-RAY EXAM OF LOWER LEG: CPT | Mod: LT

## 2023-02-07 RX ORDER — HYOSCYAMINE SULFATE 0.125 MG
125 TABLET ORAL EVERY 6 HOURS PRN
Qty: 30 TABLET | Refills: 1 | Status: SHIPPED | OUTPATIENT
Start: 2023-02-07 | End: 2024-01-30 | Stop reason: SDUPTHER

## 2023-02-07 ASSESSMENT — FIBROSIS 4 INDEX: FIB4 SCORE: 2.12

## 2023-02-07 ASSESSMENT — PATIENT HEALTH QUESTIONNAIRE - PHQ9: CLINICAL INTERPRETATION OF PHQ2 SCORE: 0

## 2023-02-08 NOTE — PROGRESS NOTES
"Chief Complaint   Patient presents with    Bleeding/Bruising     Left shin-painful with pressure    Foot Pain     Left heel    Sinus Problem     Right nostril    Rash     itching       Subjective:     HPI:   Heriberto Nichols is a 77 y.o. female here  to discuss the evaluation and management of:    1.  Right nostril has recurrent swelling and soreS with crusting and drainage at times      Her  has history of recurrent skin infections-possible staph    2.  She continues to have intermittent rash which last for about an hour at times  The rash can be on her lower extremities or other parts of her body  Can respond to hydrocortisone  She is allergic to shellfish    We stopped Lexapro and venlafaxine but the rash persists intermittently      3.  She reports she recently returned from a trip to [place] and did a lot of hard walking in the airports  She was not wearing her usual walking shoes and has for the last 10 days had left heel pain  No prior history      4.  She bruised her left shin a couple of months ago and still has quite a bit of pain when she kneels down  Initially she had a hematoma but that has resolved       No problems updated.          Objective:     /74   Pulse 79   Temp 36.6 °C (97.9 °F)   Resp 14   Ht 1.664 m (5' 5.51\")   Wt 62 kg (136 lb 11 oz)   SpO2 99%  Body mass index is 22.39 kg/m².    Physical Exam:  Physical Exam  Constitutional: Well-developed and well-nourished. Not diaphoretic. No distress.   Skin: Skin is warm and dry. No rash noted.  Head: Atraumatic without lesions.  Eyes: Conjunctivae and extraocular motions are normal.   Ears:  External ears unremarkable.    Nose: Nares patent. Mucosa without edema or erythema. No discharge. No facial tenderness.     Neck: Supple, No thyromegaly present. No JVD  Cardiovascular: Regular rate and rhythm.   Chest: Effort normal. Clear to auscultation throughout. No adventitious sounds.   Abdomen:  without distention.  " .  Extremities: No cyanosis, clubbing, erythema, nor edema.  Left lower extremity- no bony tenderness along the tibia  Foot has no obvious swelling or redness  Neurological: Alert and oriented x 3.    Psychiatric:  Behavior, mood, and affect are appropriate     Assessment and Plan:     The following treatment plan was discussed/researched:  No problem-specific Assessment & Plan notes found for this encounter.      1. Trauma  - DX-TIBIA AND FIBULA LEFT; Future    2. Inflammatory pain of left heel-plantar fasciitis, rule out bone spurs  - DX-OS CALCIS (HEEL) 2+ LEFT; Future  Discussed icing, stretching and orthotics      3. Irritable bowel syndrome, unspecified type  - hyoscyamine (LEVSIN) 0.125 MG tablet; Take 1 Tablet by mouth every 6 hours as needed for Cramping.  Dispense: 30 Tablet; Refill: 1    Other orders  - mupirocin (BACTROBAN) 2 % Ointment; Apply 1 Application topically 2 times a day.  Dispense: 22 g; Refill: 3      4.  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                                                                                                                                                                             Any change or worsening of signs or symptoms, patient encouraged to follow-up or report to emergency room for further evaluation. Patient verbalizes understanding and agrees.    Follow-Up: As needed      PLEASE NOTE: This dictation was created using voice recognition software. I have made every reasonable attempt to correct obvious errors, but I expect that there are errors of grammar and possibly content that I did not discover before finalizing the note.      My total time spent caring for the patient on the day of the encounter was  greater than  40 minutes.   This includes obtaining history, reviewing chart, physical exam, patient education, reviewing outside records, placing orders, interpreting tests and coordinating care.

## 2023-05-30 ENCOUNTER — PATIENT MESSAGE (OUTPATIENT)
Dept: INTERNAL MEDICINE | Facility: IMAGING CENTER | Age: 78
End: 2023-05-30
Payer: MEDICARE

## 2023-06-01 RX ORDER — MIRABEGRON 50 MG/1
1 TABLET, FILM COATED, EXTENDED RELEASE ORAL DAILY
Qty: 30 TABLET | Refills: 3 | Status: SHIPPED | OUTPATIENT
Start: 2023-06-01 | End: 2023-09-25

## 2023-08-01 ENCOUNTER — OFFICE VISIT (OUTPATIENT)
Dept: CARDIOLOGY | Facility: MEDICAL CENTER | Age: 78
End: 2023-08-01
Attending: INTERNAL MEDICINE
Payer: MEDICARE

## 2023-08-01 VITALS
WEIGHT: 136 LBS | SYSTOLIC BLOOD PRESSURE: 100 MMHG | DIASTOLIC BLOOD PRESSURE: 72 MMHG | BODY MASS INDEX: 22.66 KG/M2 | HEIGHT: 65 IN | OXYGEN SATURATION: 97 % | HEART RATE: 75 BPM | RESPIRATION RATE: 16 BRPM

## 2023-08-01 DIAGNOSIS — I47.10 SVT (SUPRAVENTRICULAR TACHYCARDIA) (HCC): ICD-10-CM

## 2023-08-01 DIAGNOSIS — H53.19 VISUAL HALOS: ICD-10-CM

## 2023-08-01 PROCEDURE — 93005 ELECTROCARDIOGRAM TRACING: CPT | Performed by: INTERNAL MEDICINE

## 2023-08-01 PROCEDURE — 93010 ELECTROCARDIOGRAM REPORT: CPT | Performed by: INTERNAL MEDICINE

## 2023-08-01 PROCEDURE — 3078F DIAST BP <80 MM HG: CPT | Performed by: INTERNAL MEDICINE

## 2023-08-01 PROCEDURE — 99212 OFFICE O/P EST SF 10 MIN: CPT | Performed by: INTERNAL MEDICINE

## 2023-08-01 PROCEDURE — 3074F SYST BP LT 130 MM HG: CPT | Performed by: INTERNAL MEDICINE

## 2023-08-01 PROCEDURE — 99213 OFFICE O/P EST LOW 20 MIN: CPT | Performed by: INTERNAL MEDICINE

## 2023-08-01 PROCEDURE — 99214 OFFICE O/P EST MOD 30 MIN: CPT | Performed by: INTERNAL MEDICINE

## 2023-08-01 ASSESSMENT — FIBROSIS 4 INDEX: FIB4 SCORE: 2.15

## 2023-08-01 NOTE — PROGRESS NOTES
Arrhythmia Clinic Note (Established patient)    DOS: 8/1/2023    Chief complaint/Reason for consult: F/u SVT    Interval History:  Pt is an 77 yo F. History of alec ATs s/p prior ablation with Dr. Rdz. On BB. No recurrence. Main complaint today sounds like visual auras. She has headaches but these are not associated. No visual field deficit but says sometimes for hours she will see an arc of light in her visual fields. Bilateral. Saw her eye doctor who told her it was a vascular issue.    ROS (+ highlighted in red):  General--Negative for fatigue, weight loss or weight gain  Cardiovascular--Negative for CP, orthopnea, PND    Past Medical History:   Diagnosis Date    Allergy     Benign microscopic hematuria 2/9/2017    DDD (degenerative disc disease), lumbar 5/4/2015    Glaucoma     Hiatal hernia     IBS (irritable bowel syndrome)     Osteopenia     PAC (premature atrial contraction)     Rosacea     Seasonal allergies 5/18/2021    SVT (supraventricular tachycardia) (HCC) 2/9/2015    Ulcer 2004       Past Surgical History:   Procedure Laterality Date    COLONOSCOPY  2003    recheck 10 years    ABDOMINAL HYSTERECTOMY TOTAL  1987    BUNIONECTOMY      x2       Social History     Socioeconomic History    Marital status:      Spouse name: Not on file    Number of children: 1    Years of education: Not on file    Highest education level: Not on file   Occupational History    Not on file   Tobacco Use    Smoking status: Never    Smokeless tobacco: Never   Vaping Use    Vaping Use: Never used   Substance and Sexual Activity    Alcohol use: Yes     Comment: rare    Drug use: No    Sexual activity: Not on file     Comment: , 1child   Other Topics Concern    Not on file   Social History Narrative    Retired      1 child     Social Determinants of Health     Financial Resource Strain: Not on file   Food Insecurity: Not on file   Transportation Needs: Not on file   Physical Activity: Not on file    Stress: Not on file   Social Connections: Not on file   Intimate Partner Violence: Not on file   Housing Stability: Not on file       Family History   Problem Relation Age of Onset    Cancer Mother         uterine    Hypertension Mother     Heart Disease Mother         arrythmia    Cancer Father     Cancer Maternal Aunt         breast       Allergies   Allergen Reactions    Asa [Aspirin] Vomiting    Shellfish Allergy Vomiting     diarrhea       Current Outpatient Medications   Medication Sig Dispense Refill    Mirabegron ER (MYRBETRIQ) 50 MG TABLET SR 24 HR Take 1 Tablet by mouth every day. 30 Tablet 3    hyoscyamine (LEVSIN) 0.125 MG tablet Take 1 Tablet by mouth every 6 hours as needed for Cramping. 30 Tablet 1    mupirocin (BACTROBAN) 2 % Ointment Apply 1 Application topically 2 times a day. 22 g 3    metoprolol tartrate (LOPRESSOR) 25 MG Tab Take 1 Tablet by mouth 2 times a day. 180 Tablet 1    irbesartan (AVAPRO) 75 MG tablet Take 1 Tablet by mouth every day. 90 Tablet 1    Ivermectin 1 % Cream APPLY 1 APPLICATION ON THE SKIN DAILY APPLY TO THE AFFECTED AREA ONCE DAILY      omeprazole (PRILOSEC) 20 MG delayed-release capsule Take 1 Capsule by mouth every day. 90 Capsule 3    fluticasone (FLONASE) 50 MCG/ACT nasal spray Administer 2 Sprays into affected nostril(S) every day. 9.9 mL 6    FINACEA 15 % Gel 1 APPLICATION APPLY ON THE SKIN TWICE A DAY  6    TRAVATAN Z 0.004 % Solution INSTILL 1 GTT IN OU HS  3    metronidazole (METROLOTION) 0.75 % Lotion 1 APPLICATION APPLY ON THE SKIN TWICE A DAY  6    PATADAY 0.2 % Solution INSTILL 1 DROP IN BOTH EYES DAILY  4    LUTEIN PO Take 1 Tab by mouth every day.      Calcium Carbonate-Vitamin D (CALCIUM + D PO) Take 1 Tab by mouth every day.      Omega-3 Fatty Acids (FISH OIL) 1000 MG CAPS capsule Take 1,000 mg by mouth every day.       No current facility-administered medications for this visit.       Physical Exam:  There were no vitals filed for this visit.  General  appearance: NAD, conversant  HEENT: PERRL, neck is supple with FROM  Lungs: Clear to auscultation, normal respiratory effort  CV: RRR, no murmurs/rubs/gallops, no JVD  Abdomen: Soft, non-tender with normal bowel sounds  Extremities: No peripheral edema, no clubbing or cyanosis  Skin: No rash, lesions, or ulcers  Psych: Alert and oriented to person, place and time    Data:  Labs reviewed    Prior echo/stress reviewed:  Preserved LV function    EKG interpreted by me:  NSR    Impression/Plan:  1. SVT (supraventricular tachycardia) (HCC)  EKG      2. Visual halos  US-CAROTID DOPPLER BILAT    Referral to Neurology        -Stable from SVT standpoint  -I do not think he visual disturbances are necessarily cardiac in origin as her eye exam did not reveal ischemia/emboli  -I will get a carotid US  -Referral to neuro    Shalom Crockett MD

## 2023-08-16 DIAGNOSIS — K21.9 GASTROESOPHAGEAL REFLUX DISEASE WITHOUT ESOPHAGITIS: ICD-10-CM

## 2023-08-16 RX ORDER — OMEPRAZOLE 20 MG/1
20 CAPSULE, DELAYED RELEASE ORAL
Qty: 90 CAPSULE | Refills: 3 | Status: SHIPPED | OUTPATIENT
Start: 2023-08-16

## 2023-08-24 ENCOUNTER — HOSPITAL ENCOUNTER (OUTPATIENT)
Dept: RADIOLOGY | Facility: MEDICAL CENTER | Age: 78
End: 2023-08-24
Attending: INTERNAL MEDICINE
Payer: MEDICARE

## 2023-08-24 DIAGNOSIS — H53.19 VISUAL HALOS: ICD-10-CM

## 2023-08-24 PROCEDURE — 93880 EXTRACRANIAL BILAT STUDY: CPT

## 2023-08-24 RX ORDER — IRBESARTAN 75 MG/1
75 TABLET ORAL DAILY
Qty: 90 TABLET | Refills: 1 | Status: SHIPPED | OUTPATIENT
Start: 2023-08-24 | End: 2024-02-20

## 2023-08-31 LAB — EKG IMPRESSION: NORMAL

## 2023-09-25 RX ORDER — MIRABEGRON 50 MG/1
1 TABLET, FILM COATED, EXTENDED RELEASE ORAL DAILY
Qty: 30 TABLET | Refills: 3 | Status: SHIPPED | OUTPATIENT
Start: 2023-09-25 | End: 2024-01-30 | Stop reason: SDUPTHER

## 2023-10-06 DIAGNOSIS — Z12.31 ENCOUNTER FOR SCREENING MAMMOGRAM FOR BREAST CANCER: ICD-10-CM

## 2023-10-06 DIAGNOSIS — R92.30 DENSE BREASTS: ICD-10-CM

## 2023-10-06 DIAGNOSIS — R92.2 DENSE BREASTS: ICD-10-CM

## 2023-11-29 ENCOUNTER — PATIENT MESSAGE (OUTPATIENT)
Dept: HEALTH INFORMATION MANAGEMENT | Facility: OTHER | Age: 78
End: 2023-11-29

## 2023-11-29 ENCOUNTER — OFFICE VISIT (OUTPATIENT)
Dept: NEUROLOGY | Facility: MEDICAL CENTER | Age: 78
End: 2023-11-29
Attending: STUDENT IN AN ORGANIZED HEALTH CARE EDUCATION/TRAINING PROGRAM
Payer: MEDICARE

## 2023-11-29 VITALS
DIASTOLIC BLOOD PRESSURE: 70 MMHG | BODY MASS INDEX: 23.21 KG/M2 | WEIGHT: 144.4 LBS | HEART RATE: 81 BPM | SYSTOLIC BLOOD PRESSURE: 146 MMHG | OXYGEN SATURATION: 95 % | TEMPERATURE: 97.2 F | HEIGHT: 66 IN

## 2023-11-29 DIAGNOSIS — K21.9 GASTROESOPHAGEAL REFLUX DISEASE WITHOUT ESOPHAGITIS: ICD-10-CM

## 2023-11-29 DIAGNOSIS — M50.30 DDD (DEGENERATIVE DISC DISEASE), CERVICAL: ICD-10-CM

## 2023-11-29 DIAGNOSIS — R53.83 OTHER FATIGUE: ICD-10-CM

## 2023-11-29 DIAGNOSIS — R31.1 BENIGN MICROSCOPIC HEMATURIA: ICD-10-CM

## 2023-11-29 DIAGNOSIS — H53.9 VISUAL AURA: ICD-10-CM

## 2023-11-29 DIAGNOSIS — R41.3 SHORT-TERM MEMORY LOSS: ICD-10-CM

## 2023-11-29 DIAGNOSIS — R40.4 TRANSIENT ALTERATION OF AWARENESS: ICD-10-CM

## 2023-11-29 DIAGNOSIS — M48.02 CERVICAL STENOSIS OF SPINAL CANAL: ICD-10-CM

## 2023-11-29 DIAGNOSIS — K58.9 IRRITABLE BOWEL SYNDROME, UNSPECIFIED TYPE: ICD-10-CM

## 2023-11-29 DIAGNOSIS — G40.89 VISUAL SEIZURE (HCC): ICD-10-CM

## 2023-11-29 DIAGNOSIS — R51.9 FREQUENT HEADACHES: ICD-10-CM

## 2023-11-29 DIAGNOSIS — I10 PRIMARY HYPERTENSION: ICD-10-CM

## 2023-11-29 DIAGNOSIS — M51.36 DDD (DEGENERATIVE DISC DISEASE), LUMBAR: ICD-10-CM

## 2023-11-29 PROCEDURE — 99204 OFFICE O/P NEW MOD 45 MIN: CPT | Performed by: STUDENT IN AN ORGANIZED HEALTH CARE EDUCATION/TRAINING PROGRAM

## 2023-11-29 PROCEDURE — 3078F DIAST BP <80 MM HG: CPT | Performed by: STUDENT IN AN ORGANIZED HEALTH CARE EDUCATION/TRAINING PROGRAM

## 2023-11-29 PROCEDURE — 99212 OFFICE O/P EST SF 10 MIN: CPT | Performed by: STUDENT IN AN ORGANIZED HEALTH CARE EDUCATION/TRAINING PROGRAM

## 2023-11-29 PROCEDURE — 3077F SYST BP >= 140 MM HG: CPT | Performed by: STUDENT IN AN ORGANIZED HEALTH CARE EDUCATION/TRAINING PROGRAM

## 2023-11-29 ASSESSMENT — FIBROSIS 4 INDEX: FIB4 SCORE: 2.15

## 2023-11-29 NOTE — PROGRESS NOTES
NEUROLOGY NEW PATIENT ENCOUNTER - 11/29/2023   REFERRING PROVIDER:  Shalom Crockett M.D.  [address]  [address]  [place] [address]  Shalom Crockett   REASON FOR VISIT: Heriberto Nichols 78 y.o. female presents today to establish care with me.    SUMMARY OF PROBLEMS AND/OR DIAGNOSES:      Patient had new onset visual auras back in July, happened a few times.     She saw colors, left visual field, it was a crescent. Unclear if colors were in both eyes. Lasted an hour at times. She does not remember if they were associated with headaches. NO other neurological symptoms.    She does has a history of headaches.       She has family hx of migraines (daughters).    Her BP is notably elevated    Patient's PMH, PSH, FH, SH, allergies, and medications were reviewed:   has a past medical history of Allergy, Benign microscopic hematuria (2/9/2017), DDD (degenerative disc disease), lumbar (5/4/2015), Glaucoma, Hiatal hernia, IBS (irritable bowel syndrome), Osteopenia, PAC (premature atrial contraction), Rosacea, Seasonal allergies (5/18/2021), SVT (supraventricular tachycardia) (2/9/2015), and Ulcer (2004).    has a past surgical history that includes bunionectomy; colonoscopy (2003); and abdominal hysterectomy total (1987).   family history includes Cancer in her father, maternal aunt, and mother; Heart Disease in her mother; Hypertension in her mother.    reports that she has never smoked. She has never used smokeless tobacco. She reports current alcohol use. She reports that she does not use drugs.     ROS negative except that which was mentioned above    CURRENT MEDICATIONS AT THE TIME OF THIS ENCOUNTER:    Current Outpatient Medications:     Myrbetriq, 1 Tablet, Oral, DAILY, Taking    irbesartan, 75 mg, Oral, DAILY, Taking    metoprolol tartrate, 25 mg, Oral, BID, Taking    omeprazole, 20 mg, Oral, QDAY, Taking    hyoscyamine, 125 mcg, Oral, Q6HRS PRN, PRN    mupirocin, 1 Application , Topical, BID, PRN    Ivermectin,  "APPLY 1 APPLICATION ON THE SKIN DAILY APPLY TO THE AFFECTED AREA ONCE DAILY, Taking    fluticasone, 2 Spray, Nasal, DAILY, PRN    Finacea, 1 APPLICATION APPLY ON THE SKIN TWICE A DAY, Taking    Travatan Z, INSTILL 1 GTT IN OU HS, Taking    metronidazole, 1 APPLICATION APPLY ON THE SKIN TWICE A DAY, Taking    LUTEIN PO, 1 Tablet, Oral, DAILY, Taking    Calcium Carbonate-Vitamin D (CALCIUM + D PO), 1 Tablet, Oral, DAILY, Taking    Pataday, INSTILL 1 DROP IN BOTH EYES DAILY (Patient not taking: Reported on 11/29/2023), Not Taking    fish oil, 1,000 mg, Oral, DAILY (Patient not taking: Reported on 8/1/2023), Not Taking     EXAM:   Ambulatory Vitals:  BP (!) 146/70 (BP Location: Right arm, Patient Position: Sitting, BP Cuff Size: Adult)   Pulse 81   Temp 36.2 °C (97.2 °F) (Temporal)   Ht 1.676 m (5' 6\")   Wt 65.5 kg (144 lb 6.4 oz)   SpO2 95%    Physical Exam:  Physical Exam  Constitutional:       General: She is not in acute distress.     Appearance: She is not ill-appearing.   HENT:      Head: Normocephalic and atraumatic.      Nose: Nose normal. No congestion or rhinorrhea.      Mouth/Throat:      Mouth: Mucous membranes are moist.      Pharynx: No oropharyngeal exudate or posterior oropharyngeal erythema.   Eyes:      Extraocular Movements: EOM normal. No nystagmus.   Cardiovascular:      Rate and Rhythm: Normal rate and regular rhythm.      Pulses: Normal pulses.      Heart sounds: Normal heart sounds.   Pulmonary:      Effort: Pulmonary effort is normal.      Breath sounds: Normal breath sounds. No wheezing.   Musculoskeletal:         General: No swelling or tenderness. Normal range of motion.      Cervical back: Normal range of motion and neck supple. No rigidity.   Skin:     General: Skin is warm and dry.      Coloration: Skin is not jaundiced.      Findings: No erythema or lesion.   Neurological:      Mental Status: She is alert.      Motor: Motor strength is normal.     Coordination: Coordination is " intact.   Psychiatric:         Speech: Speech normal.        Neurological Exam   Neurological Exam  Mental Status  Alert. Recent and remote memory are intact. Speech is normal. Language is fluent with no aphasia. Attention and concentration are normal. Fund of knowledge is appropriate for level of education.    Cranial Nerves  CN II: Right funduscopic exam: not visualized. Left funduscopic exam: not visualized.  CN III, IV, VI: Extraocular movements intact bilaterally. No nystagmus. Normal saccades. Normal smooth pursuit.   Right pupil: 3 mm. Round. Reactive to light. Reactive to accommodation.   Left pupil: 3 mm. Round. Reactive to light. Reactive to accommodation.  Relative afferent pupillary defect absent.  CN V: Facial sensation is normal.  CN VII: Full and symmetric facial movement.  CN IX, X: Palate elevates symmetrically  CN XI: Shoulder shrug strength is normal.  CN XII: Tongue midline without atrophy or fasciculations.    Motor  Normal muscle bulk throughout. No fasciculations present. Normal muscle tone. No abnormal involuntary movements. Strength is 5/5 throughout all four extremities.    Sensory  Light touch is normal in upper and lower extremities.     Reflexes  Diffuse depressed.    Coordination    Finger-to-nose, rapid alternating movements and heel-to-shin normal bilaterally without dysmetria.    Gait  Casual gait is normal including stance, stride, and arm swing.       RELEVANT DATA PERSONALLY REVIEWED:       ASSESSMENT, EDUCATION, COUNSELING:  This is a 78 y.o. female patient who presents to the neurology clinic. We had an extensive discussion about the patient's symptoms, signs, and work-up to date, if any. We discussed potential and/or definitive diagnoses, work-up, and potential treatments.       PLAN:  If applicable, the work-up such as labs, imaging, procedures, and/or other testing, referrals, and/or recommended treatment strategies are listed below.  Visit Diagnoses     ICD-10-CM   1. Benign  microscopic hematuria  R31.1   2. Cervical stenosis of spinal canal  M48.02   3. DDD (degenerative disc disease), lumbar  M51.36   4. Gastroesophageal reflux disease without esophagitis  K21.9   5. Primary hypertension  I10   6. Irritable bowel syndrome, unspecified type  K58.9   7. DDD (degenerative disc disease), cervical  M50.30   8. Visual seizure (HCC)  G40.89   9. Visual aura  H53.9   10. Frequent headaches  R51.9   11. Transient alteration of awareness  R40.4   12. Other fatigue  R53.83   13. Short-term memory loss  R41.3      Orders Placed This Encounter    MR-BRAIN-WITH & W/O    CBC WITH DIFFERENTIAL    CRP QUANTITIVE (NON-CARDIAC)    Comp Metabolic Panel    TSH+T4F+T3FREE    Referral to Neurodiagnostics (EEG,EP,EMG/NCS/DBS)      Patient recurrent visual auras/colorful visual hallucinations in the left visual field, a/w AMS (>), headaches which are not new. She has not had these symptoms for almost 4 months now. Still need to further evaluate this as differential includes seizures, HTN emergency/ORES, brain lesion. Migraines, , among others. Ocular abnormalities also not entirely exclude . She will check her blood pressure twice per day and whenever she has headaches or visual symptoms and keep a journal of her BP values and heart ra Also needs labs as above and 24 hour amb EEG.             BILLING DOCUMENTATION:     I spent a total of  I spent a total of 45 minutes on the day of the visit.   minutes of face-to-face time in this visit. Over 50% of the time of the visit today was spent on counseling and/or coordination of care wtih the patient and/or family, as above in assessment in plan.    Anupam Palomo MD  Epilepsy and General Neurology  Department of Neurology  Clinical  of Neurology [facility] [employer].

## 2023-11-29 NOTE — PATIENT INSTRUCTIONS
NEUROLOGY CLINIC VISIT WITH DR. PALOMO     PLEASE READ THIS ENTIRE DOCUMENT CAREFULLY AND COMPLETELY:    First and foremost, you matter to Dr. Palomo and you deserve the best care.   Dr. Palomo prides himself on providing the best possible care to all his patients. He strives to make each appointment meaningful, so that all your concerns are being addressed and all your neurological problems are being optimally treated. In order to achieve these goals for everyone, Dr. Palomo has listed important reminders and the best ways to prepare for each appointment. Please read each item carefully. Thank you!    Due to the high volume of patients we are trying to help, your physician will not be able to respond by phone or in [facility] to your routine concerns between appointments.  This does not reflect a lack of interest or concern for you or your diagnosis.  Please bring these questions and concerns to your appointment where your physician can answer.  Please relay more pressing concerns to our office, either via [facility], or by phone; if not able to reach us please visit nearby Urgent Care Center or Emergency Department.  If any emergent medical needs, please seek emergent medical help and/or call 911.    Also, please note that we are not able to fill out paperwork that might be related to your work, utility company, disability, and/or driving, among others, in between the visits.  Please schedule a dedicated appointment to address any and all paperwork.  This is not due to lack of concern or interest for your disease-related work/administrative problems, but to make sure that we provide the best possible care and to fill out your paperwork in a correct, complete, and timely manner.  ------------------------------------------------------------------------------------------  Please let our office know if you have any changes in your seizure frequency and/characteristics.     Please keep a diary of your seizures and bring it  with you to each appointment.    Please take vitamin D3 [id number] internation units daily.     Please abstain from driving until further notice    If you are a biological female with epilepsy who is of reproductive age, who is actively breastfeeding, and/or who infants/young children:  Please take folic acid 1 mg daily. This is an over-the-counter supplement that is recommended to prevent certain developmental problems in your baby, in case you become pregnant in the future.  It is critical that you let our office know as soon as you become pregnant or plan to become pregnant.  If you are caring for a baby/young child, please make sure to be sitting on a soft surface while holding your baby/young child, so in case you have a seizure, your baby/young child is not injured due to fall.   Please let us know if, while breastfeeding, you observe that your baby is excessively sleepy and/or has other behavioral changes. Because many antiseizure medications are collected in breast milk, some nursing babies can suffer adverse medication effects.    Please note that the following might precipitate seizures:   missed doses of antiseizure medications  being sick with a fever, stress  Fatigue  sleep deprivation or abnormal sleeping patterns  not eating regularly  not drinking enough water  drinking too much alcohol  stopping alcohol suddenly if you are currently using it on a regular/daily basis,   using recreational drugs, among others.    Please note that the following might lead to an injury or even be life-threatening in the event you have a seizure and/or lose awareness while:  being in a large body of water by yourself, such as bath, pool, lake, ocean, among others (risk of drowning)  being on unprotected heights (risk of fall)  being around and/or operating heavy machinery (risk of injury)  being around open fire/hot surfaces (risk of burns)  any other activities/circumstances, in which if you lose awareness, you might  injure yourself and/or others.  -------------------------------------------------------------------------------------------  SUDEP (SUDDEN UNEXPECTED DEATH IN EPILEPSY)  It is important that your seizures are well controlled and you have none or have them rarely. In addition to avoiding injury related to breakthrough seizures, frequent seizures increase risk of SUDEP (sudden unexpected death in epilepsy), where a person goes into a seizure and then never wakes up. The best way to prevent SUDEP is to control your seizures well.   ------------------------------------------------------------------------------------------  Please call for help (crisis line and/or 911) in case you have thoughts of harming yourself and/or others.  ------------------------------------------------------------------------------------------  INSTRUCTIONS FOR YOUR FAMILY/CAREGIVERS:  Please call 911 if the patient has a seizure longer than 2-3 minutes, if seizures are back to back without her recovering to her baseline, or she does not start recovering within 5-10 minutes after the seizure stops. During the seizure - please turn her on her side, please make sure her head is protected (for example, you should put a pillow under her head, if one is available), and please do not put anything in her mouth.   ------------------------------------------------------------------------------------------  PATIENT EXPECTATIONS,  IMPORTANT APPOINTMENT REMINDERS, AND ADDITIONAL HELPFUL TIPS:   REFILLS:   Request refills AT LEAST 1 week in advance to ensure you do not run out of medications    MyChart  It is STRONGLY encouraged that ALL patients sign up for MyChart. It is BY FAR the fastest and most convenient way for both Dr. Palomo and patients to obtain timely refills.  If you are having trouble signing up or logging into your account, staff are available to help you. Please ask a medical assistant or staff at the  to assist you.    TEST RESULS:    All labs and diagnostic test results will be reviewed at your next visit, UNLESS  Dr. Palomo determines that there are important findings on the tests need to be acted on sooner. Dr. Palomo will either call or send a message through [employer] if this is the case.    BE PREPARED PRIOR TO EVERY APPOINTMENT:  All patient are responsible for ensuring that ALL test results that were completed outside of the [facility] system have been received by our Neurology Department PRIOR to your appointment with Dr. Palomo.    IMPORTANT:  ALL images (not just the reports) must be sent and uploaded to the [facility] system. Dr. Palomo reviews all images personally prior to each visit. Ensuring that ALL the test results and test images are accessible to Dr. Palomo prior to your appointment is YOUR responsibility and an important part of making the most out of each appointment.   Bring a government-issues picture ID and an updated insurance card EVERY visit.  It is highly recommended that you bring at every visit a list of the most important topics that you want address. While it may not be possible to address all items on the list in a single visit, preparing a list will ensure that Dr. Palomo addresses the items that are most important to you and your health    PAPERWORK, DOCUMENTATION, LETTER REQUESTS:  You must notify the office ahead of your appointment of all paperwork or letter requests.   Please DO NOT wait until the last minute to make these requests. Please give all paperwork to the medical assistant at the start of the appointment and check-in process. Please note that Dr. Palomo may not be able complete some types of documentation in a single appointment or even within a single day or week. This is why it is important to communicate paperwork requests prior to your appointment and at least 2 weeks prior to any deadlines.    KNOW ALL YOU MEDICATIONS:   AT EVERY SINGLE APPOINTMENT, please bring a list of every single prescribed,  non-prescribed, and over the counter medication or supplements you are taking, including ones taken on a rare or intermittent basis.  Include the following information for each prescribed or non-prescribed medications:  Name of medication   The strength of EACH pill/capsule/tablet, etc.   The number of pills/capsules/tablets, etc taken per dose  The number and time of day that doses are taken  For every single Supplement that you take on a routine or intermittent basis, you must include:  The Brand Name   A complete list of every single ingredient, compound, vitamin, and/or mineral in each dose, along with the corresponding amounts/strengths of all ingredients, vitamins, minerals, etc., if such information is provided or known  The number of doses taken per day and time of day doses are taken  If medications are taken on an intermittent or as needed basis, please estimate how many days per week or days per month the medications are used  DO NOT just print out your medication list from [email or web address] or bring a list from a prior appointment or hospitalizations because the information is often often unreliable, inaccurate, outdated, and/or incomplete   The list should be printed or written  If you forget or do not have a list of all the medication, then it is acceptable, although less preferred, to bring all the bottles to the appointment     ARRIVE EARLY FOR ALL VISITS:  Please note that we are unable to accommodate late arrivals as per office policy.  YOU-the patient - (NOT a parent, spouse, or friend) must be physically present at check-in no later than 12 minutes after the scheduled appointment time, or you will be asked to reschedule   Consider scheduling a virtual appointment with Dr. Palomo through [email or web address] as an alternative if transportation to the clinic is difficult or unavailable   Please note, however, that virtual visits can only be scheduled after being an established patient of Dr. Palomo. All new appointments  "must be done in-person in clinic  Some insurances will not cover the cost of virtual appointments. Please check with your insurance to find out if these visits are covered    COMMUNICATING URGENT AND NON-URGENT MATTERS:  Your concerns are important and deserve to be heard and addressed. If you have an urgent matter, there are two methods that will ensure your concerns are prioritized appropriately:   Preferred method: Sign-up/Login to your [employer] account and send a message addressed to Dr. Palomo or Ary Tejeda (Dr. Palomo's assistant). In the subject line, type \"urgent\" followed by a word or phrase describing the situation (For example, write \"Urgent: Out of antiseuzre med and need refill\" or \"Urgent: Severe side effects to new meds\". In doing this, our staff can ensure urgent messages are triaged appropriately and communicated to Dr. Palomo that day.  Call [facility]rology main line at [phone]. Dr. Palomo's voicemail extension is [phone]. When leaving a voice message, specifically indicate if it is urgent (or non-urgent) so that the matter can be triaged appropriately and addressed in a timely manner    Thank you for entrusting your neurological care to [facility] and we look forward to continuing to serve you.   "

## 2023-12-07 ENCOUNTER — HOSPITAL ENCOUNTER (OUTPATIENT)
Dept: LAB | Facility: MEDICAL CENTER | Age: 78
End: 2023-12-07
Attending: STUDENT IN AN ORGANIZED HEALTH CARE EDUCATION/TRAINING PROGRAM
Payer: MEDICARE

## 2023-12-07 DIAGNOSIS — R40.4 TRANSIENT ALTERATION OF AWARENESS: ICD-10-CM

## 2023-12-07 LAB
ALBUMIN SERPL BCP-MCNC: 4 G/DL (ref 3.2–4.9)
ALBUMIN/GLOB SERPL: 1.3 G/DL
ALP SERPL-CCNC: 57 U/L (ref 30–99)
ALT SERPL-CCNC: 19 U/L (ref 2–50)
ANION GAP SERPL CALC-SCNC: 11 MMOL/L (ref 7–16)
AST SERPL-CCNC: 21 U/L (ref 12–45)
BASOPHILS # BLD AUTO: 1.1 % (ref 0–1.8)
BASOPHILS # BLD: 0.07 K/UL (ref 0–0.12)
BILIRUB SERPL-MCNC: 1.8 MG/DL (ref 0.1–1.5)
BUN SERPL-MCNC: 15 MG/DL (ref 8–22)
CALCIUM ALBUM COR SERPL-MCNC: 9.7 MG/DL (ref 8.5–10.5)
CALCIUM SERPL-MCNC: 9.7 MG/DL (ref 8.5–10.5)
CHLORIDE SERPL-SCNC: 107 MMOL/L (ref 96–112)
CO2 SERPL-SCNC: 23 MMOL/L (ref 20–33)
CREAT SERPL-MCNC: 0.93 MG/DL (ref 0.5–1.4)
CRP SERPL HS-MCNC: <0.3 MG/DL (ref 0–0.75)
EOSINOPHIL # BLD AUTO: 0.14 K/UL (ref 0–0.51)
EOSINOPHIL NFR BLD: 2.2 % (ref 0–6.9)
ERYTHROCYTE [DISTWIDTH] IN BLOOD BY AUTOMATED COUNT: 42.5 FL (ref 35.9–50)
GFR SERPLBLD CREATININE-BSD FMLA CKD-EPI: 62 ML/MIN/1.73 M 2
GLOBULIN SER CALC-MCNC: 3.1 G/DL (ref 1.9–3.5)
GLUCOSE SERPL-MCNC: 84 MG/DL (ref 65–99)
HCT VFR BLD AUTO: 41.1 % (ref 37–47)
HGB BLD-MCNC: 14.3 G/DL (ref 12–16)
IMM GRANULOCYTES # BLD AUTO: 0.01 K/UL (ref 0–0.11)
IMM GRANULOCYTES NFR BLD AUTO: 0.2 % (ref 0–0.9)
LYMPHOCYTES # BLD AUTO: 1.8 K/UL (ref 1–4.8)
LYMPHOCYTES NFR BLD: 27.8 % (ref 22–41)
MCH RBC QN AUTO: 32.3 PG (ref 27–33)
MCHC RBC AUTO-ENTMCNC: 34.8 G/DL (ref 32.2–35.5)
MCV RBC AUTO: 92.8 FL (ref 81.4–97.8)
MONOCYTES # BLD AUTO: 0.55 K/UL (ref 0–0.85)
MONOCYTES NFR BLD AUTO: 8.5 % (ref 0–13.4)
NEUTROPHILS # BLD AUTO: 3.9 K/UL (ref 1.82–7.42)
NEUTROPHILS NFR BLD: 60.2 % (ref 44–72)
NRBC # BLD AUTO: 0 K/UL
NRBC BLD-RTO: 0 /100 WBC (ref 0–0.2)
PLATELET # BLD AUTO: 210 K/UL (ref 164–446)
PMV BLD AUTO: 10.7 FL (ref 9–12.9)
POTASSIUM SERPL-SCNC: 3.7 MMOL/L (ref 3.6–5.5)
PROT SERPL-MCNC: 7.1 G/DL (ref 6–8.2)
RBC # BLD AUTO: 4.43 M/UL (ref 4.2–5.4)
SODIUM SERPL-SCNC: 141 MMOL/L (ref 135–145)
T3FREE SERPL-MCNC: 2.54 PG/ML (ref 2–4.4)
T4 FREE SERPL-MCNC: 1.06 NG/DL (ref 0.93–1.7)
TSH SERPL DL<=0.005 MIU/L-ACNC: 2.47 UIU/ML (ref 0.38–5.33)
WBC # BLD AUTO: 6.6 K/UL (ref 4.8–10.8)

## 2023-12-07 PROCEDURE — 84439 ASSAY OF FREE THYROXINE: CPT

## 2023-12-07 PROCEDURE — 85025 COMPLETE CBC W/AUTO DIFF WBC: CPT

## 2023-12-07 PROCEDURE — 80053 COMPREHEN METABOLIC PANEL: CPT

## 2023-12-07 PROCEDURE — 86140 C-REACTIVE PROTEIN: CPT

## 2023-12-07 PROCEDURE — 84481 FREE ASSAY (FT-3): CPT

## 2023-12-07 PROCEDURE — 84443 ASSAY THYROID STIM HORMONE: CPT

## 2023-12-07 PROCEDURE — 36415 COLL VENOUS BLD VENIPUNCTURE: CPT

## 2023-12-09 ENCOUNTER — OFFICE VISIT (OUTPATIENT)
Dept: URGENT CARE | Facility: CLINIC | Age: 78
End: 2023-12-09
Payer: MEDICARE

## 2023-12-09 VITALS
SYSTOLIC BLOOD PRESSURE: 120 MMHG | TEMPERATURE: 98.4 F | DIASTOLIC BLOOD PRESSURE: 60 MMHG | HEIGHT: 66 IN | OXYGEN SATURATION: 95 % | WEIGHT: 141 LBS | RESPIRATION RATE: 16 BRPM | BODY MASS INDEX: 22.66 KG/M2 | HEART RATE: 78 BPM

## 2023-12-09 DIAGNOSIS — J01.90 ACUTE BACTERIAL SINUSITIS: ICD-10-CM

## 2023-12-09 DIAGNOSIS — J02.9 PHARYNGITIS, UNSPECIFIED ETIOLOGY: ICD-10-CM

## 2023-12-09 DIAGNOSIS — B96.89 ACUTE BACTERIAL SINUSITIS: ICD-10-CM

## 2023-12-09 LAB — S PYO DNA SPEC NAA+PROBE: NOT DETECTED

## 2023-12-09 PROCEDURE — 3078F DIAST BP <80 MM HG: CPT | Performed by: PHYSICIAN ASSISTANT

## 2023-12-09 PROCEDURE — 99213 OFFICE O/P EST LOW 20 MIN: CPT | Performed by: PHYSICIAN ASSISTANT

## 2023-12-09 PROCEDURE — 3074F SYST BP LT 130 MM HG: CPT | Performed by: PHYSICIAN ASSISTANT

## 2023-12-09 PROCEDURE — 87651 STREP A DNA AMP PROBE: CPT | Performed by: PHYSICIAN ASSISTANT

## 2023-12-09 RX ORDER — AZELASTINE 1 MG/ML
1 SPRAY, METERED NASAL 2 TIMES DAILY
Qty: 30 ML | Refills: 0 | Status: SHIPPED | OUTPATIENT
Start: 2023-12-09

## 2023-12-09 RX ORDER — AMOXICILLIN AND CLAVULANATE POTASSIUM 875; 125 MG/1; MG/1
1 TABLET, FILM COATED ORAL 2 TIMES DAILY
Qty: 14 TABLET | Refills: 0 | Status: SHIPPED | OUTPATIENT
Start: 2023-12-09 | End: 2023-12-16

## 2023-12-09 ASSESSMENT — ENCOUNTER SYMPTOMS
HEADACHES: 0
SHORTNESS OF BREATH: 0
CHILLS: 0
SORE THROAT: 1
NAUSEA: 0
FEVER: 0
VOMITING: 0
COUGH: 1
DIARRHEA: 0
EYE PAIN: 0
ABDOMINAL PAIN: 0
CONSTIPATION: 0
MYALGIAS: 0

## 2023-12-09 ASSESSMENT — FIBROSIS 4 INDEX: FIB4 SCORE: 1.835325870964494127

## 2023-12-09 NOTE — PROGRESS NOTES
"Subjective:   Heriberto Nichols is a 78 y.o. female who presents for Otalgia (Ear pressure both ears, patient states ears \"plugged\") and Pharyngitis (X 4-5 days )    78-year-old female presents with baseline allergies that she has had for the last couple weeks however on 4 days ago she started noticing more congestion, difficulty breathing through the nose, increase of ear pressure.  This morning she woke up with fairly severe pharyngitis noted pain on swallowing.  This improved after eating.  She has noted some generalized malaise and fatigue but no fevers or body aches.  She has had an occasional dry cough.  Avoids antihistamines due to dry eyes, intermittently perform saline rinses, intermittently uses nasal steroid spray    Review of Systems   Constitutional:  Positive for malaise/fatigue. Negative for chills and fever.   HENT:  Positive for congestion, ear pain and sore throat.    Eyes:  Negative for pain.   Respiratory:  Positive for cough. Negative for shortness of breath.    Cardiovascular:  Negative for chest pain.   Gastrointestinal:  Negative for abdominal pain, constipation, diarrhea, nausea and vomiting.   Genitourinary:  Negative for dysuria.   Musculoskeletal:  Negative for myalgias.   Skin:  Negative for rash.   Neurological:  Negative for headaches.       Medications, Allergies, and current problem list reviewed today in Epic.     Objective:     /60 (BP Location: Left arm, Patient Position: Sitting, BP Cuff Size: Large adult)   Pulse 78   Temp 36.9 °C (98.4 °F)   Resp 16   Ht 1.676 m (5' 6\")   Wt 64 kg (141 lb)   SpO2 95%     Physical Exam  Vitals reviewed.   Constitutional:       Appearance: Normal appearance.   HENT:      Head: Normocephalic and atraumatic.      Right Ear: Tympanic membrane, ear canal and external ear normal.      Left Ear: Tympanic membrane, ear canal and external ear normal.      Nose: Congestion and rhinorrhea present.      Comments: No maxillary sinus TTP     " Mouth/Throat:      Mouth: Mucous membranes are moist.      Pharynx: No oropharyngeal exudate or posterior oropharyngeal erythema.      Comments: POST NASAL DRIP  Eyes:      Conjunctiva/sclera: Conjunctivae normal.      Pupils: Pupils are equal, round, and reactive to light.   Cardiovascular:      Rate and Rhythm: Normal rate and regular rhythm.   Pulmonary:      Effort: Pulmonary effort is normal.      Breath sounds: Normal breath sounds.   Musculoskeletal:      Cervical back: Normal range of motion.   Lymphadenopathy:      Cervical: No cervical adenopathy.   Skin:     General: Skin is warm and dry.      Capillary Refill: Capillary refill takes less than 2 seconds.   Neurological:      Mental Status: She is alert and oriented to person, place, and time.         Assessment/Plan:     Diagnosis and associated orders:     1. Acute bacterial sinusitis  azelastine (ASTELIN) 137 MCG/SPRAY nasal spray    amoxicillin-clavulanate (AUGMENTIN) 875-125 MG Tab         Comments/MDM:     Patient is quite at risk of developing a sinus infection but I encouraged her to trial azelastine and monitor for improvement.  Continue prescription sent for Augmentin if she notes any worsening or failure to improve after 48 to 72 hours.  Restart nasal steroid spray, use Fang pot or other saline rinses appropriately.  If worsening symptoms consider follow-up.  Strep testing was negative in clinic         Differential diagnosis, natural history, supportive care, and indications for immediate follow-up discussed.    Advised the patient to follow-up with the primary care physician for recheck, reevaluation, and consideration of further management.    Please note that this dictation was created using voice recognition software. I have made a reasonable attempt to correct obvious errors, but I expect that there are errors of grammar and possibly content that I did not discover before finalizing the note.    This note was electronically signed by  Trey Coronel PA-C

## 2023-12-21 ENCOUNTER — NON-PROVIDER VISIT (OUTPATIENT)
Dept: NEUROLOGY | Facility: MEDICAL CENTER | Age: 78
End: 2023-12-21
Attending: STUDENT IN AN ORGANIZED HEALTH CARE EDUCATION/TRAINING PROGRAM
Payer: MEDICARE

## 2023-12-21 DIAGNOSIS — R40.4 TRANSIENT ALTERATION OF AWARENESS: ICD-10-CM

## 2023-12-21 DIAGNOSIS — G40.89 VISUAL SEIZURE (HCC): ICD-10-CM

## 2023-12-21 DIAGNOSIS — H53.9 VISUAL AURA: ICD-10-CM

## 2023-12-21 PROCEDURE — 95719 EEG PHYS/QHP EA INCR W/O VID: CPT | Performed by: STUDENT IN AN ORGANIZED HEALTH CARE EDUCATION/TRAINING PROGRAM

## 2023-12-21 PROCEDURE — 95708 EEG WO VID EA 12-26HR UNMNTR: CPT | Performed by: STUDENT IN AN ORGANIZED HEALTH CARE EDUCATION/TRAINING PROGRAM

## 2023-12-21 PROCEDURE — 95700 EEG CONT REC W/VID EEG TECH: CPT | Performed by: STUDENT IN AN ORGANIZED HEALTH CARE EDUCATION/TRAINING PROGRAM

## 2023-12-21 NOTE — PROCEDURES
AMBULATORY ELECTROENCEPHALOGRAM REPORT      REFERRING PROVIDER:    Anupam Palomo M.D.  [address] [facility] [address]  [place] [address]  DOS: 12/21/2023   DURATION OF RECORDING: (23 hours and 30 minutes)    INDICATION:  Heriberto Nichols 78 y.o. female presenting with  altered mental status and paroxysmal neurological events    CURRENT OUTPATIENT MEDICATION LIST:   Home Medications    Medication Sig Taking? Last Dose Authorizing Provider   azelastine (ASTELIN) 137 MCG/SPRAY nasal spray Administer 1 Spray into affected nostril(S) 2 times a day.   Trey Coronel P.A.-C.   MYRBETRIQ 50 MG TABLET SR 24 HR TAKE 1 TABLET BY MOUTH EVERY DAY   Jayda Garcia M.D.   irbesartan (AVAPRO) 75 MG tablet TAKE 1 TABLET BY MOUTH EVERY DAY   Jayda Garcia M.D.   metoprolol tartrate (LOPRESSOR) 25 MG Tab TAKE 1 TABLET BY MOUTH TWICE A DAY   Jayda Garcia M.D.   omeprazole (PRILOSEC) 20 MG delayed-release capsule TAKE 1 CAPSULE BY MOUTH EVERY DAY   Jayda Garcia M.D.   hyoscyamine (LEVSIN) 0.125 MG tablet Take 1 Tablet by mouth every 6 hours as needed for Cramping.   Jayda Garcia M.D.   mupirocin (BACTROBAN) 2 % Ointment Apply 1 Application topically 2 times a day.   Jayda Garcia M.D.   Ivermectin 1 % Cream APPLY 1 APPLICATION ON THE SKIN DAILY APPLY TO THE AFFECTED AREA ONCE DAILY   Physician Outpatient   fluticasone (FLONASE) 50 MCG/ACT nasal spray Administer 2 Sprays into affected nostril(S) every day.   Jayda Garcia M.D.   FINACEA 15 % Gel 1 APPLICATION APPLY ON THE SKIN TWICE A DAY   Physician Outpatient   TRAVATAN Z 0.004 % Solution INSTILL 1 GTT IN OU HS   Physician Outpatient   metronidazole (METROLOTION) 0.75 % Lotion 1 APPLICATION APPLY ON THE SKIN TWICE A DAY   Physician Outpatient   PATADAY 0.2 % Solution INSTILL 1 DROP IN BOTH EYES DAILY  Patient not taking: Reported on 11/29/2023   Physician Outpatient   LUTEIN PO Take 1 Tab by mouth every day.   Physician Outpatient   Calcium  Carbonate-Vitamin D (CALCIUM + D PO) Take 1 Tab by mouth every day.   Physician Outpatient   Omega-3 Fatty Acids (FISH OIL) 1000 MG CAPS capsule Take 1,000 mg by mouth every day.  Patient not taking: Reported on 8/1/2023   Nn Emergency Md Per Protocol, M.D.        TECHNIQUE:   The EEG was set up and taken down by a Neurodiagnostic technologist who performed education to patient and staff.  A minimum but not limited to 23 electrodes and 23 channel recording was setup and performed by Neurodiagnostic technologist. Impedances, electrode integrity, and technical impressions were documented a minimum of every 2-24 hour period by a Neurodiagnostic Technologist and reviewed by Interpreting Physician.    DESCRIPTION OF THE RECORD:  During maximal wakefulness, the background was continuous and showed a 8.5-9 Hz posterior dominant rhythm.  Reactivity and state changes were present.  During drowsiness, theta/delta frequencies were seen.    Sleep was captured and was characterized by diffuse background delta/theta activity with a loss of myogenic artifact.  N2 sleep transients in the form of sleep spindles and vertex waves were seen in the leads over the central regions.     ICTAL AND INTERICTAL FINDINGS:   No focal or generalized epileptiform activity noted.     No regional slowing or persistent focal asymmetries were seen.    No seizures.    ACTIVATION PROCEDURES:   Intermittent Photic stimulation was performed in a stepwise fashion from 1 to 30 Hz and did not elicited any abnormalities on EEG.     EKG: Sampling of the EKG recording showed sinus rhythm    EVENTS:  Push button events and/or ambulatory diary events: None    INTERPRETATION:  Normal ambulatory EEG recording in the awake and drowsy/sleep state(s):  -No regional slowing or persistent focal asymmetries were seen.  -No epileptiform discharges were seen.  -No seizures. Clinical correlation is recommended.  -Clinical Events: None  EEG Disclaimer : Note: This EEG does  not rule out the possibility of seizures or exclude a diagnosis of epilepsy.  If the clinical suspicion remains high for seizures, a prolonged recording to capture clinical or subclinical events may be helpful.        Anupam Palomo MD  Department of Neurology at [facility]  General Neurologist and Epileptologist  Director of [facility]'s Level III Comprehensive Epilepsy Program  Professor of Clinical Neurology, [facility].   Phone: [phone]  Fax: [phone]  E-mail: [email or web address]

## 2023-12-22 ENCOUNTER — NON-PROVIDER VISIT (OUTPATIENT)
Dept: NEUROLOGY | Facility: MEDICAL CENTER | Age: 78
End: 2023-12-22
Attending: STUDENT IN AN ORGANIZED HEALTH CARE EDUCATION/TRAINING PROGRAM
Payer: MEDICARE

## 2023-12-28 ENCOUNTER — HOSPITAL ENCOUNTER (OUTPATIENT)
Dept: RADIOLOGY | Facility: MEDICAL CENTER | Age: 78
End: 2023-12-28
Attending: STUDENT IN AN ORGANIZED HEALTH CARE EDUCATION/TRAINING PROGRAM
Payer: MEDICARE

## 2023-12-28 DIAGNOSIS — R40.4 TRANSIENT ALTERATION OF AWARENESS: ICD-10-CM

## 2023-12-28 DIAGNOSIS — G40.89 VISUAL SEIZURE (HCC): ICD-10-CM

## 2023-12-28 DIAGNOSIS — H53.9 VISUAL AURA: ICD-10-CM

## 2023-12-28 DIAGNOSIS — R51.9 FREQUENT HEADACHES: ICD-10-CM

## 2023-12-28 PROCEDURE — A9579 GAD-BASE MR CONTRAST NOS,1ML: HCPCS | Performed by: STUDENT IN AN ORGANIZED HEALTH CARE EDUCATION/TRAINING PROGRAM

## 2023-12-28 PROCEDURE — 70553 MRI BRAIN STEM W/O & W/DYE: CPT

## 2023-12-28 PROCEDURE — 700117 HCHG RX CONTRAST REV CODE 255: Performed by: STUDENT IN AN ORGANIZED HEALTH CARE EDUCATION/TRAINING PROGRAM

## 2023-12-28 RX ADMIN — GADOTERIDOL 14 ML: 279.3 INJECTION, SOLUTION INTRAVENOUS at 09:42

## 2024-01-04 ENCOUNTER — PATIENT MESSAGE (OUTPATIENT)
Dept: INTERNAL MEDICINE | Facility: IMAGING CENTER | Age: 79
End: 2024-01-04
Payer: MEDICARE

## 2024-01-04 DIAGNOSIS — Z79.899 MEDICATION MANAGEMENT: ICD-10-CM

## 2024-01-04 DIAGNOSIS — M54.9 CHRONIC NECK AND BACK PAIN: ICD-10-CM

## 2024-01-04 DIAGNOSIS — G89.29 CHRONIC NECK AND BACK PAIN: ICD-10-CM

## 2024-01-04 DIAGNOSIS — M54.2 CHRONIC NECK AND BACK PAIN: ICD-10-CM

## 2024-01-12 DIAGNOSIS — Z01.84 LACK OF IMMUNITY TO HEPATITIS B VIRUS DEMONSTRATED BY SEROLOGIC TEST: ICD-10-CM

## 2024-01-12 DIAGNOSIS — Z11.59 ENCOUNTER FOR SCREENING FOR OTHER VIRAL DISEASES: ICD-10-CM

## 2024-01-15 ENCOUNTER — HOSPITAL ENCOUNTER (OUTPATIENT)
Facility: MEDICAL CENTER | Age: 79
End: 2024-01-15
Attending: FAMILY MEDICINE
Payer: MEDICARE

## 2024-01-15 ENCOUNTER — NON-PROVIDER VISIT (OUTPATIENT)
Dept: INTERNAL MEDICINE | Facility: IMAGING CENTER | Age: 79
End: 2024-01-15
Payer: MEDICARE

## 2024-01-15 DIAGNOSIS — Z79.899 MEDICATION MANAGEMENT: ICD-10-CM

## 2024-01-15 DIAGNOSIS — Z01.84 LACK OF IMMUNITY TO HEPATITIS B VIRUS DEMONSTRATED BY SEROLOGIC TEST: ICD-10-CM

## 2024-01-15 DIAGNOSIS — Z11.59 ENCOUNTER FOR SCREENING FOR OTHER VIRAL DISEASES: ICD-10-CM

## 2024-01-15 LAB
25(OH)D3 SERPL-MCNC: 50 NG/ML (ref 30–100)
ALBUMIN SERPL BCP-MCNC: 4.2 G/DL (ref 3.2–4.9)
ALBUMIN/GLOB SERPL: 1.6 G/DL
ALP SERPL-CCNC: 64 U/L (ref 30–99)
ALT SERPL-CCNC: 19 U/L (ref 2–50)
ANION GAP SERPL CALC-SCNC: 11 MMOL/L (ref 7–16)
AST SERPL-CCNC: 22 U/L (ref 12–45)
BASOPHILS # BLD AUTO: 1.3 % (ref 0–1.8)
BASOPHILS # BLD: 0.06 K/UL (ref 0–0.12)
BILIRUB SERPL-MCNC: 1.4 MG/DL (ref 0.1–1.5)
BUN SERPL-MCNC: 17 MG/DL (ref 8–22)
CALCIUM ALBUM COR SERPL-MCNC: 9.1 MG/DL (ref 8.5–10.5)
CALCIUM SERPL-MCNC: 9.3 MG/DL (ref 8.5–10.5)
CHLORIDE SERPL-SCNC: 109 MMOL/L (ref 96–112)
CHOLEST SERPL-MCNC: 186 MG/DL (ref 100–199)
CO2 SERPL-SCNC: 24 MMOL/L (ref 20–33)
CREAT SERPL-MCNC: 0.97 MG/DL (ref 0.5–1.4)
EOSINOPHIL # BLD AUTO: 0.18 K/UL (ref 0–0.51)
EOSINOPHIL NFR BLD: 3.8 % (ref 0–6.9)
ERYTHROCYTE [DISTWIDTH] IN BLOOD BY AUTOMATED COUNT: 45.5 FL (ref 35.9–50)
EST. AVERAGE GLUCOSE BLD GHB EST-MCNC: 105 MG/DL
GFR SERPLBLD CREATININE-BSD FMLA CKD-EPI: 59 ML/MIN/1.73 M 2
GLOBULIN SER CALC-MCNC: 2.7 G/DL (ref 1.9–3.5)
GLUCOSE SERPL-MCNC: 94 MG/DL (ref 65–99)
HBA1C MFR BLD: 5.3 % (ref 4–5.6)
HBV SURFACE AB SERPL IA-ACNC: <3.5 MIU/ML (ref 0–10)
HCT VFR BLD AUTO: 42.2 % (ref 37–47)
HDLC SERPL-MCNC: 74 MG/DL
HGB BLD-MCNC: 14.2 G/DL (ref 12–16)
IMM GRANULOCYTES # BLD AUTO: 0.01 K/UL (ref 0–0.11)
IMM GRANULOCYTES NFR BLD AUTO: 0.2 % (ref 0–0.9)
LDLC SERPL CALC-MCNC: 101 MG/DL
LYMPHOCYTES # BLD AUTO: 1.49 K/UL (ref 1–4.8)
LYMPHOCYTES NFR BLD: 31.6 % (ref 22–41)
MCH RBC QN AUTO: 32.3 PG (ref 27–33)
MCHC RBC AUTO-ENTMCNC: 33.6 G/DL (ref 32.2–35.5)
MCV RBC AUTO: 96.1 FL (ref 81.4–97.8)
MONOCYTES # BLD AUTO: 0.45 K/UL (ref 0–0.85)
MONOCYTES NFR BLD AUTO: 9.6 % (ref 0–13.4)
NEUTROPHILS # BLD AUTO: 2.52 K/UL (ref 1.82–7.42)
NEUTROPHILS NFR BLD: 53.5 % (ref 44–72)
NRBC # BLD AUTO: 0 K/UL
NRBC BLD-RTO: 0 /100 WBC (ref 0–0.2)
PLATELET # BLD AUTO: 224 K/UL (ref 164–446)
PMV BLD AUTO: 10.8 FL (ref 9–12.9)
POTASSIUM SERPL-SCNC: 4 MMOL/L (ref 3.6–5.5)
PROT SERPL-MCNC: 6.9 G/DL (ref 6–8.2)
RBC # BLD AUTO: 4.39 M/UL (ref 4.2–5.4)
SODIUM SERPL-SCNC: 144 MMOL/L (ref 135–145)
TRIGL SERPL-MCNC: 56 MG/DL (ref 0–149)
TSH SERPL DL<=0.005 MIU/L-ACNC: 2.86 UIU/ML (ref 0.38–5.33)
VIT B12 SERPL-MCNC: 471 PG/ML (ref 211–911)
WBC # BLD AUTO: 4.7 K/UL (ref 4.8–10.8)

## 2024-01-15 PROCEDURE — 82607 VITAMIN B-12: CPT

## 2024-01-15 PROCEDURE — 85025 COMPLETE CBC W/AUTO DIFF WBC: CPT

## 2024-01-15 PROCEDURE — 99999 PR NO CHARGE: CPT

## 2024-01-15 PROCEDURE — 86706 HEP B SURFACE ANTIBODY: CPT | Mod: GA

## 2024-01-15 PROCEDURE — 84443 ASSAY THYROID STIM HORMONE: CPT

## 2024-01-15 PROCEDURE — 83036 HEMOGLOBIN GLYCOSYLATED A1C: CPT

## 2024-01-15 PROCEDURE — 80053 COMPREHEN METABOLIC PANEL: CPT

## 2024-01-15 PROCEDURE — 80061 LIPID PANEL: CPT

## 2024-01-15 PROCEDURE — 82306 VITAMIN D 25 HYDROXY: CPT

## 2024-01-22 ENCOUNTER — OFFICE VISIT (OUTPATIENT)
Dept: CARDIOLOGY | Facility: MEDICAL CENTER | Age: 79
End: 2024-01-22
Attending: INTERNAL MEDICINE
Payer: MEDICARE

## 2024-01-22 VITALS
HEIGHT: 66 IN | OXYGEN SATURATION: 93 % | HEART RATE: 69 BPM | SYSTOLIC BLOOD PRESSURE: 124 MMHG | BODY MASS INDEX: 23.14 KG/M2 | DIASTOLIC BLOOD PRESSURE: 60 MMHG | RESPIRATION RATE: 14 BRPM | WEIGHT: 144 LBS

## 2024-01-22 DIAGNOSIS — I47.10 SVT (SUPRAVENTRICULAR TACHYCARDIA) (HCC): ICD-10-CM

## 2024-01-22 PROCEDURE — 99213 OFFICE O/P EST LOW 20 MIN: CPT | Performed by: INTERNAL MEDICINE

## 2024-01-22 PROCEDURE — 3074F SYST BP LT 130 MM HG: CPT | Performed by: INTERNAL MEDICINE

## 2024-01-22 PROCEDURE — 3078F DIAST BP <80 MM HG: CPT | Performed by: INTERNAL MEDICINE

## 2024-01-22 PROCEDURE — 93005 ELECTROCARDIOGRAM TRACING: CPT | Performed by: INTERNAL MEDICINE

## 2024-01-22 ASSESSMENT — FIBROSIS 4 INDEX: FIB4 SCORE: 1.8

## 2024-01-22 NOTE — PROGRESS NOTES
Arrhythmia Clinic Note (Established patient)    DOS: 1/22/2024    Chief complaint/Reason for consult: F/u SVT    Interval History:  Pt is an 77 yo F. History of SVT s/p prior ablation. Here for follow-up. Last visit complaining of visual auras. Underwent carotid US which were not remarkable. MR  was not remarkable. Neuro had her on EEG monitoring but symptoms have not recurred. No complaints today.    ROS (+ highlighted in red):  General--Negative for fatigue, weight loss or weight gain  Cardiovascular--Negative for CP, orthopnea, PND    Past Medical History:   Diagnosis Date    Allergy     Benign microscopic hematuria 2/9/2017    DDD (degenerative disc disease), lumbar 5/4/2015    Glaucoma     Hiatal hernia     IBS (irritable bowel syndrome)     Osteopenia     PAC (premature atrial contraction)     Rosacea     Seasonal allergies 5/18/2021    SVT (supraventricular tachycardia) 2/9/2015    Ulcer 2004       Past Surgical History:   Procedure Laterality Date    COLONOSCOPY  2003    recheck 10 years    ABDOMINAL HYSTERECTOMY TOTAL  1987    BUNIONECTOMY      x2       Social History     Socioeconomic History    Marital status:      Spouse name: Not on file    Number of children: 1    Years of education: Not on file    Highest education level: Not on file   Occupational History    Not on file   Tobacco Use    Smoking status: Never    Smokeless tobacco: Never   Vaping Use    Vaping Use: Never used   Substance and Sexual Activity    Alcohol use: Yes     Comment: rare    Drug use: No    Sexual activity: Not on file     Comment: , 1child   Other Topics Concern    Not on file   Social History Narrative    Retired      1 child     Social Determinants of Health     Financial Resource Strain: Not on file   Food Insecurity: Not on file   Transportation Needs: Not on file   Physical Activity: Not on file   Stress: Not on file   Social Connections: Not on file   Intimate Partner Violence: Not on file    Housing Stability: Not on file       Family History   Problem Relation Age of Onset    Cancer Mother         uterine    Hypertension Mother     Heart Disease Mother         arrythmia    Cancer Father     Cancer Maternal Aunt         breast       Allergies   Allergen Reactions    Asa [Aspirin] Vomiting    Shellfish Allergy Vomiting     diarrhea    Fish Oil Itching       Current Outpatient Medications   Medication Sig Dispense Refill    azelastine (ASTELIN) 137 MCG/SPRAY nasal spray Administer 1 Spray into affected nostril(S) 2 times a day. 30 mL 0    MYRBETRIQ 50 MG TABLET SR 24 HR TAKE 1 TABLET BY MOUTH EVERY DAY 30 Tablet 3    irbesartan (AVAPRO) 75 MG tablet TAKE 1 TABLET BY MOUTH EVERY DAY 90 Tablet 1    metoprolol tartrate (LOPRESSOR) 25 MG Tab TAKE 1 TABLET BY MOUTH TWICE A  Tablet 1    omeprazole (PRILOSEC) 20 MG delayed-release capsule TAKE 1 CAPSULE BY MOUTH EVERY DAY 90 Capsule 3    hyoscyamine (LEVSIN) 0.125 MG tablet Take 1 Tablet by mouth every 6 hours as needed for Cramping. 30 Tablet 1    mupirocin (BACTROBAN) 2 % Ointment Apply 1 Application topically 2 times a day. 22 g 3    Ivermectin 1 % Cream APPLY 1 APPLICATION ON THE SKIN DAILY APPLY TO THE AFFECTED AREA ONCE DAILY      fluticasone (FLONASE) 50 MCG/ACT nasal spray Administer 2 Sprays into affected nostril(S) every day. 9.9 mL 6    FINACEA 15 % Gel 1 APPLICATION APPLY ON THE SKIN TWICE A DAY  6    TRAVATAN Z 0.004 % Solution INSTILL 1 GTT IN OU HS  3    LUTEIN PO Take 1 Tab by mouth every day.      Calcium Carbonate-Vitamin D (CALCIUM + D PO) Take 1 Tab by mouth every day.      metronidazole (METROLOTION) 0.75 % Lotion 1 APPLICATION APPLY ON THE SKIN TWICE A DAY (Patient not taking: Reported on 1/22/2024)  6     No current facility-administered medications for this visit.       Physical Exam:  Vitals:    01/22/24 1013   BP: 124/60   BP Location: Left arm   Patient Position: Sitting   BP Cuff Size: Adult   Pulse: 69   Resp: 14   SpO2: 93%  "  Weight: 65.3 kg (144 lb)   Height: 1.676 m (5' 6\")     General appearance: NAD, conversant  HEENT: PERRL, neck is supple with FROM  Lungs: Clear to auscultation, normal respiratory effort  CV: RRR, no murmurs/rubs/gallops, no JVD  Abdomen: Soft, non-tender with normal bowel sounds  Extremities: No peripheral edema, no clubbing or cyanosis  Skin: No rash, lesions, or ulcers  Psych: Alert and oriented to person, place and time    Data:  MR brain and carotid US reviewed    EKG interpreted by me:  Sinus    Impression/Plan:  1. SVT (supraventricular tachycardia) (HCC)  EKG      -BP at goal  -No further SVT on the low dose metop  -No changes to regimen  -F/u in 12 mo    Shalom Crockett MD    "

## 2024-01-24 LAB — EKG IMPRESSION: NORMAL

## 2024-01-24 PROCEDURE — 93010 ELECTROCARDIOGRAM REPORT: CPT | Mod: GZ | Performed by: INTERNAL MEDICINE

## 2024-01-29 RX ORDER — CYCLOSPORINE 0.5 MG/ML
EMULSION OPHTHALMIC
COMMUNITY
Start: 2024-01-08

## 2024-01-30 ENCOUNTER — OFFICE VISIT (OUTPATIENT)
Dept: INTERNAL MEDICINE | Facility: IMAGING CENTER | Age: 79
End: 2024-01-30
Payer: MEDICARE

## 2024-01-30 VITALS
OXYGEN SATURATION: 98 % | SYSTOLIC BLOOD PRESSURE: 118 MMHG | HEIGHT: 66 IN | TEMPERATURE: 97.4 F | RESPIRATION RATE: 14 BRPM | BODY MASS INDEX: 23.14 KG/M2 | WEIGHT: 144 LBS | DIASTOLIC BLOOD PRESSURE: 70 MMHG | HEART RATE: 69 BPM

## 2024-01-30 DIAGNOSIS — Z00.00 MEDICARE ANNUAL WELLNESS VISIT, SUBSEQUENT: Primary | ICD-10-CM

## 2024-01-30 DIAGNOSIS — K58.9 IRRITABLE BOWEL SYNDROME, UNSPECIFIED TYPE: ICD-10-CM

## 2024-01-30 DIAGNOSIS — M72.2 PLANTAR FASCIITIS: ICD-10-CM

## 2024-01-30 DIAGNOSIS — M54.2 CHRONIC NECK AND BACK PAIN: ICD-10-CM

## 2024-01-30 DIAGNOSIS — G89.29 CHRONIC NECK AND BACK PAIN: ICD-10-CM

## 2024-01-30 DIAGNOSIS — I10 PRIMARY HYPERTENSION: ICD-10-CM

## 2024-01-30 DIAGNOSIS — M54.9 CHRONIC NECK AND BACK PAIN: ICD-10-CM

## 2024-01-30 PROCEDURE — 3074F SYST BP LT 130 MM HG: CPT | Performed by: FAMILY MEDICINE

## 2024-01-30 PROCEDURE — G0439 PPPS, SUBSEQ VISIT: HCPCS | Performed by: FAMILY MEDICINE

## 2024-01-30 PROCEDURE — 3078F DIAST BP <80 MM HG: CPT | Performed by: FAMILY MEDICINE

## 2024-01-30 RX ORDER — MIRABEGRON 50 MG/1
1 TABLET, FILM COATED, EXTENDED RELEASE ORAL DAILY
Qty: 90 TABLET | Refills: 3 | Status: SHIPPED | OUTPATIENT
Start: 2024-01-30

## 2024-01-30 RX ORDER — HYOSCYAMINE SULFATE 0.125 MG
125 TABLET ORAL EVERY 6 HOURS PRN
Qty: 30 TABLET | Refills: 1 | Status: SHIPPED | OUTPATIENT
Start: 2024-01-30

## 2024-01-30 ASSESSMENT — ENCOUNTER SYMPTOMS: GENERAL WELL-BEING: EXCELLENT

## 2024-01-30 ASSESSMENT — FIBROSIS 4 INDEX: FIB4 SCORE: 1.8

## 2024-01-30 ASSESSMENT — PATIENT HEALTH QUESTIONNAIRE - PHQ9: CLINICAL INTERPRETATION OF PHQ2 SCORE: 0

## 2024-01-30 ASSESSMENT — ACTIVITIES OF DAILY LIVING (ADL): BATHING_REQUIRES_ASSISTANCE: 0

## 2024-02-20 RX ORDER — IRBESARTAN 75 MG/1
75 TABLET ORAL DAILY
Qty: 90 TABLET | Refills: 1 | Status: SHIPPED | OUTPATIENT
Start: 2024-02-20

## 2024-03-08 ENCOUNTER — OFFICE VISIT (OUTPATIENT)
Dept: NEUROLOGY | Facility: MEDICAL CENTER | Age: 79
End: 2024-03-08
Attending: STUDENT IN AN ORGANIZED HEALTH CARE EDUCATION/TRAINING PROGRAM
Payer: MEDICARE

## 2024-03-08 VITALS
DIASTOLIC BLOOD PRESSURE: 64 MMHG | HEART RATE: 67 BPM | BODY MASS INDEX: 22.36 KG/M2 | SYSTOLIC BLOOD PRESSURE: 110 MMHG | WEIGHT: 139.11 LBS | OXYGEN SATURATION: 97 % | RESPIRATION RATE: 14 BRPM | HEIGHT: 66 IN

## 2024-03-08 DIAGNOSIS — H53.9 VISUAL AURA: ICD-10-CM

## 2024-03-08 DIAGNOSIS — I10 PRIMARY HYPERTENSION: ICD-10-CM

## 2024-03-08 DIAGNOSIS — D32.0 MENINGIOMA, CEREBRAL (HCC): ICD-10-CM

## 2024-03-08 DIAGNOSIS — R40.4 TRANSIENT ALTERATION OF AWARENESS: ICD-10-CM

## 2024-03-08 DIAGNOSIS — K58.9 IRRITABLE BOWEL SYNDROME, UNSPECIFIED TYPE: ICD-10-CM

## 2024-03-08 PROCEDURE — 99215 OFFICE O/P EST HI 40 MIN: CPT | Performed by: STUDENT IN AN ORGANIZED HEALTH CARE EDUCATION/TRAINING PROGRAM

## 2024-03-08 PROCEDURE — 99212 OFFICE O/P EST SF 10 MIN: CPT | Performed by: STUDENT IN AN ORGANIZED HEALTH CARE EDUCATION/TRAINING PROGRAM

## 2024-03-08 RX ORDER — CARBOXYMETHYLCELLULOSE SODIUM 5 MG/ML
1 SOLUTION/ DROPS OPHTHALMIC PRN
COMMUNITY

## 2024-03-08 RX ORDER — VIT C/B6/B5/MAGNESIUM/HERB 173 50-5-6-5MG
1000 CAPSULE ORAL DAILY
COMMUNITY

## 2024-03-08 RX ORDER — MULTIVIT-MIN/IRON/FOLIC ACID/K 18-600-40
1000 CAPSULE ORAL
COMMUNITY

## 2024-03-08 ASSESSMENT — FIBROSIS 4 INDEX: FIB4 SCORE: 1.8

## 2024-03-08 NOTE — PROGRESS NOTES
NEUROLOGY FOLLOW-UP - 03/08/2024     REASON FOR VISIT: Heriberto Nichols 78 y.o. female presents today for follow-up       SUMMARY RELEVANT PAST MEDICAL HISTORY AND/OR COMPENDIUM OF RELEVANT WORK-UP AND TREATMENTS TO DATE:  Patient had new onset visual auras back in July, happened a few times.      She saw colors, left visual field from right eye, it was a crescent. Unclear if colors were in both eyes. Lasted an hour at times. She does not remember if they were associated with headaches. NO other neurological symptoms.     She does has a history of headaches.         She has family hx of migraines (daughters).     Her BP is notably elevated    INTERVAL HISTORY:      She did a EEG 24 hour ambulatory in Dec 2023 was normal.    She reports no recurrence of her symptoms.     Her BP journal shows normal blood pressure.    MRI Brain reviewed personally and there is a right frontal meningioma likely incidental.     CURRENT MEDICATIONS AT THE TIME OF THIS ENCOUNTER:  Current Outpatient Medications on File Prior to Visit   Medication Sig Dispense Refill    carboxymethylcellulose (REFRESH TEARS) 0.5 % Solution 1 Drop as needed.      Turmeric 500 MG Cap Take 1,000 mg by mouth every day.      Cholecalciferol (VITAMIN D) 50 MCG (2000 UT) Cap Take 1,000 Units by mouth 2 times a day.      irbesartan (AVAPRO) 75 MG tablet TAKE 1 TABLET BY MOUTH EVERY DAY 90 Tablet 1    metoprolol tartrate (LOPRESSOR) 25 MG Tab TAKE 1 TABLET BY MOUTH TWICE A  Tablet 1    hyoscyamine (LEVSIN) 0.125 MG tablet Take 1 Tablet by mouth every 6 hours as needed for Cramping. 30 Tablet 1    Mirabegron ER (MYRBETRIQ) 50 MG TABLET SR 24 HR Take 1 Tablet by mouth every day. 90 Tablet 3    cycloSPORINE (RESTASIS) 0.05 % ophthalmic emulsion INSTILL 1 DROP INTO BOTH EYES EVERY 12 HOURS PA DENIED      omeprazole (PRILOSEC) 20 MG delayed-release capsule TAKE 1 CAPSULE BY MOUTH EVERY DAY 90 Capsule 3    Ivermectin 1 % Cream APPLY 1 APPLICATION ON THE SKIN  "DAILY APPLY TO THE AFFECTED AREA ONCE DAILY      fluticasone (FLONASE) 50 MCG/ACT nasal spray Administer 2 Sprays into affected nostril(S) every day. 9.9 mL 6    FINACEA 15 % Gel 1 APPLICATION APPLY ON THE SKIN TWICE A DAY  6    TRAVATAN Z 0.004 % Solution INSTILL 1 GTT IN OU HS  3    metronidazole (METROLOTION) 0.75 % Lotion   6    LUTEIN PO Take 1 Tab by mouth every day.      Calcium Carbonate-Vitamin D (CALCIUM + D PO) Take 1 Tablet by mouth every day.      azelastine (ASTELIN) 137 MCG/SPRAY nasal spray Administer 1 Spray into affected nostril(S) 2 times a day. (Patient not taking: Reported on 3/8/2024) 30 mL 0    mupirocin (BACTROBAN) 2 % Ointment Apply 1 Application topically 2 times a day. (Patient not taking: Reported on 3/8/2024) 22 g 3     No current facility-administered medications on file prior to visit.          EXAM:   /64 (BP Location: Left arm, Patient Position: Sitting, BP Cuff Size: Adult)   Pulse 67   Resp 14   Ht 1.676 m (5' 6\")   Wt 63.1 kg (139 lb 1.8 oz)   SpO2 97%    Wt Readings from Last 5 Encounters:   03/08/24 63.1 kg (139 lb 1.8 oz)   01/30/24 65.3 kg (144 lb)   01/22/24 65.3 kg (144 lb)   12/09/23 64 kg (141 lb)   11/29/23 65.5 kg (144 lb 6.4 oz)      Physical Exam:  Physical Exam  Constitutional:       General: She is not in acute distress.     Appearance: She is not ill-appearing.   HENT:      Head: Normocephalic and atraumatic.      Nose: Nose normal. No congestion or rhinorrhea.      Mouth/Throat:      Mouth: Mucous membranes are moist.      Pharynx: No oropharyngeal exudate or posterior oropharyngeal erythema.   Eyes:      Extraocular Movements: EOM normal. No nystagmus.   Cardiovascular:      Rate and Rhythm: Normal rate and regular rhythm.      Pulses: Normal pulses.      Heart sounds: Normal heart sounds.   Pulmonary:      Effort: Pulmonary effort is normal.      Breath sounds: Normal breath sounds. No wheezing.   Musculoskeletal:         General: No swelling or " tenderness. Normal range of motion.      Cervical back: Normal range of motion and neck supple. No rigidity.   Skin:     General: Skin is warm and dry.      Coloration: Skin is not jaundiced.      Findings: No erythema or lesion.   Neurological:      Mental Status: She is alert.      Motor: Motor strength is normal.     Coordination: Coordination is intact.   Psychiatric:         Speech: Speech normal.        Neurological Exam   Neurological Exam  Mental Status  Alert. Recent and remote memory are intact. Speech is normal. Language is fluent with no aphasia. Attention and concentration are normal. Fund of knowledge is appropriate for level of education.    Cranial Nerves  CN II: Right funduscopic exam: not visualized. Left funduscopic exam: not visualized.  CN III, IV, VI: Extraocular movements intact bilaterally. No nystagmus. Normal saccades. Normal smooth pursuit.   Right pupil: 3 mm. Round. Reactive to light. Reactive to accommodation.   Left pupil: 3 mm. Round. Reactive to light. Reactive to accommodation.  Relative afferent pupillary defect absent.  CN V: Facial sensation is normal.  CN VII: Full and symmetric facial movement.  CN IX, X: Palate elevates symmetrically  CN XI: Shoulder shrug strength is normal.  CN XII: Tongue midline without atrophy or fasciculations.    Motor  Normal muscle bulk throughout. No fasciculations present. Normal muscle tone. No abnormal involuntary movements. Strength is 5/5 throughout all four extremities.    Sensory  Light touch is normal in upper and lower extremities.     Reflexes  Diffuse depressed.    Coordination    Finger-to-nose, rapid alternating movements and heel-to-shin normal bilaterally without dysmetria.    Gait  Casual gait is normal including stance, stride, and arm swing.         ASSESSMENT, EDUCATION, AND COUNSELING:  This is a 78 y.o. female patient who presents to the neurology clinic. We had an extensive discussion about the patient's symptoms, signs, and  work-up to date, if any. We discussed potential and/or definitive diagnoses, work-up, and potential treatments.     PLAN:  If applicable, the work-up such as labs, imaging, procedures, and/or other testing, referrals, and/or recommended treatment strategies are listed below.    Medications were administered today in clinic (if any):     Visit Diagnoses     ICD-10-CM   1. Visual aura  H53.9   2. Transient alteration of awareness  R40.4   3. Irritable bowel syndrome, unspecified type  K58.9   4. Primary hypertension  I10   5. Meningioma, cerebral (HCC)  D32.0      Orders Placed This Encounter    carboxymethylcellulose (REFRESH TEARS) 0.5 % Solution    Turmeric 500 MG Cap    Cholecalciferol (VITAMIN D) 50 MCG (2000 UT) Cap        Patient with episode of virual aura that are no longer occurring. EEG and MRI Brain are reassuring. Seizures are less likely. These may be migraine aura or a transient intraocular phenomena . She does have a small right frontal meningioma that is likely incidental. However, I am recommending a repeat MRI Brain w/wo contrast in 6 months, sooner if needed. No further diagnostics or treatments recommended. If her symptoms recur I recommend that she notify our office to see me. She should also see an eye doctor as well        BILLING DOCUMENTATION:     The number of minutes of face-to-face time spent in this encounter was I spent a total of 40 minutes on the day of the visit.  . Over 50% of the time of the visit today was spent on counseling and/or coordination of care wtih the patient and/or family, as outlined above in assessment in plan.    Anupam Palomo MD  Department of Neurology at [facility]  Diplomate of the American Board of Psychiatry and Neurology, General Neurology  Diplomate of American Board of Psychiatry and Neurology, a Member Board of the American Board of Medical Subspecialties, Epilepsy  Director of [facility] Level III Comprehensive Epilepsy  Program  Professor of Clinical Neurology, [facility].   [address], [address]  [facility] [id number] [phone]   Fax: [phone]  E-mail: [email or web address]

## 2024-03-08 NOTE — PATIENT INSTRUCTIONS
NEUROLOGY CLINIC VISIT WITH DR. PALOMO     PLEASE READ THIS ENTIRE DOCUMENT CAREFULLY AND COMPLETELY:    First and foremost, you matter to Dr. Palomo and you deserve the best care.   Dr. Palomo prides himself on providing the best possible care to all his patients. He strives to make each appointment meaningful, so that all your concerns are being addressed and all your neurological problems are being optimally treated. In order to achieve these goals for everyone, Dr. Palomo has listed important reminders and the best ways to prepare for each appointment. Please read each item carefully. Thank you!    Due to the high volume of patients we are trying to help, your physician will not be able to respond by phone or in [employer] to your routine concerns between appointments.  This does not reflect a lack of interest or concern for you or your diagnosis.  Please bring these questions and concerns to your appointment where your physician can answer.  Please relay more pressing concerns to our office, either via [employer], or by phone; if not able to reach us please visit nearby Urgent Care Center or Emergency Department.  If any emergent medical needs, please seek emergent medical help and/or call 911.    Also, please note that we are not able to fill out paperwork that might be related to your work, utility company, disability, and/or driving, among others, in between the visits.  Please schedule a dedicated appointment to address any and all paperwork.  This is not due to lack of concern or interest for your disease-related work/administrative problems, but to make sure that we provide the best possible care and to fill out your paperwork in a correct, complete, and timely manner.  ------------------------------------------------------------------------------------------  Please let our office know if you have any changes in your seizure frequency and/characteristics.     Please keep a diary of your seizures and bring it  with you to each appointment.    Please take vitamin D3 [id number] internation units daily.     Please abstain from driving until further notice    If you are a biological female with epilepsy who is of reproductive age, who is actively breastfeeding, and/or who infants/young children:  Please take folic acid 1 mg daily. This is an over-the-counter supplement that is recommended to prevent certain developmental problems in your baby, in case you become pregnant in the future.  It is critical that you let our office know as soon as you become pregnant or plan to become pregnant.  If you are caring for a baby/young child, please make sure to be sitting on a soft surface while holding your baby/young child, so in case you have a seizure, your baby/young child is not injured due to fall.   Please let us know if, while breastfeeding, you observe that your baby is excessively sleepy and/or has other behavioral changes. Because many antiseizure medications are collected in breast milk, some nursing babies can suffer adverse medication effects.    Please note that the following might precipitate seizures:   missed doses of antiseizure medications  being sick with a fever, stress  Fatigue  sleep deprivation or abnormal sleeping patterns  not eating regularly  not drinking enough water  drinking too much alcohol  stopping alcohol suddenly if you are currently using it on a regular/daily basis,   using recreational drugs, among others.    Please note that the following might lead to an injury or even be life-threatening in the event you have a seizure and/or lose awareness while:  being in a large body of water by yourself, such as bath, pool, lake, ocean, among others (risk of drowning)  being on unprotected heights (risk of fall)  being around and/or operating heavy machinery (risk of injury)  being around open fire/hot surfaces (risk of burns)  any other activities/circumstances, in which if you lose awareness, you might  injure yourself and/or others.  -------------------------------------------------------------------------------------------  SUDEP (SUDDEN UNEXPECTED DEATH IN EPILEPSY)  It is important that your seizures are well controlled and you have none or have them rarely. In addition to avoiding injury related to breakthrough seizures, frequent seizures increase risk of SUDEP (sudden unexpected death in epilepsy), where a person goes into a seizure and then never wakes up. The best way to prevent SUDEP is to control your seizures well.   ------------------------------------------------------------------------------------------  Please call for help (crisis line and/or 911) in case you have thoughts of harming yourself and/or others.  ------------------------------------------------------------------------------------------  INSTRUCTIONS FOR YOUR FAMILY/CAREGIVERS:  Please call 911 if the patient has a seizure longer than 2-3 minutes, if seizures are back to back without her recovering to her baseline, or she does not start recovering within 5-10 minutes after the seizure stops. During the seizure - please turn her on her side, please make sure her head is protected (for example, you should put a pillow under her head, if one is available), and please do not put anything in her mouth.   ------------------------------------------------------------------------------------------  PATIENT EXPECTATIONS,  IMPORTANT APPOINTMENT REMINDERS, AND ADDITIONAL HELPFUL TIPS:   REFILLS:   Request refills AT LEAST 1 week in advance to ensure you do not run out of medications    MyChart  It is STRONGLY encouraged that ALL patients sign up for MyChart. It is BY FAR the fastest and most convenient way for both Dr. Palomo and patients to obtain timely refills.  If you are having trouble signing up or logging into your account, staff are available to help you. Please ask a medical assistant or staff at the  to assist you.    TEST RESULS:    All labs and diagnostic test results will be reviewed at your next visit, UNLESS  Dr. Palomo determines that there are important findings on the tests need to be acted on sooner. Dr. Palomo will either call or send a message through [employer] if this is the case.    BE PREPARED PRIOR TO EVERY APPOINTMENT:  All patient are responsible for ensuring that ALL test results that were completed outside of the [employer] system have been received by our Neurology Department PRIOR to your appointment with Dr. Palomo.    IMPORTANT:  ALL images (not just the reports) must be sent and uploaded to the [employer] system. Dr. Palomo reviews all images personally prior to each visit. Ensuring that ALL the test results and test images are accessible to Dr. Palomo prior to your appointment is YOUR responsibility and an important part of making the most out of each appointment.   Bring a government-issues picture ID and an updated insurance card EVERY visit.  It is highly recommended that you bring at every visit a list of the most important topics that you want address. While it may not be possible to address all items on the list in a single visit, preparing a list will ensure that Dr. Palomo addresses the items that are most important to you and your health    PAPERWORK, DOCUMENTATION, LETTER REQUESTS:  You must notify the office ahead of your appointment of all paperwork or letter requests.   Please DO NOT wait until the last minute to make these requests. Please give all paperwork to the medical assistant at the start of the appointment and check-in process. Please note that Dr. Palomo may not be able complete some types of documentation in a single appointment or even within a single day or week. This is why it is important to communicate paperwork requests prior to your appointment and at least 2 weeks prior to any deadlines.    KNOW ALL YOU MEDICATIONS:   AT EVERY SINGLE APPOINTMENT, please bring a list of every single prescribed,  non-prescribed, and over the counter medication or supplements you are taking, including ones taken on a rare or intermittent basis.  Include the following information for each prescribed or non-prescribed medications:  Name of medication   The strength of EACH pill/capsule/tablet, etc.   The number of pills/capsules/tablets, etc taken per dose  The number and time of day that doses are taken  For every single Supplement that you take on a routine or intermittent basis, you must include:  The Brand Name   A complete list of every single ingredient, compound, vitamin, and/or mineral in each dose, along with the corresponding amounts/strengths of all ingredients, vitamins, minerals, etc., if such information is provided or known  The number of doses taken per day and time of day doses are taken  If medications are taken on an intermittent or as needed basis, please estimate how many days per week or days per month the medications are used  DO NOT just print out your medication list from [employer] or bring a list from a prior appointment or hospitalizations because the information is often often unreliable, inaccurate, outdated, and/or incomplete   The list should be printed or written  If you forget or do not have a list of all the medication, then it is acceptable, although less preferred, to bring all the bottles to the appointment     ARRIVE EARLY FOR ALL VISITS:  Please note that we are unable to accommodate late arrivals as per office policy.  YOU-the patient - (NOT a parent, spouse, or friend) must be physically present at check-in no later than 12 minutes after the scheduled appointment time, or you will be asked to reschedule   Consider scheduling a virtual appointment with Dr. Palomo through [employer] as an alternative if transportation to the clinic is difficult or unavailable   Please note, however, that virtual visits can only be scheduled after being an established patient of Dr. Palomo. All new appointments  "must be done in-person in clinic  Some insurances will not cover the cost of virtual appointments. Please check with your insurance to find out if these visits are covered    COMMUNICATING URGENT AND NON-URGENT MATTERS:  Your concerns are important and deserve to be heard and addressed. If you have an urgent matter, there are two methods that will ensure your concerns are prioritized appropriately:   Preferred method: Sign-up/Login to your [employer] account and send a message addressed to Dr. Palomo or Ary Tejeda (Dr. Palomo's assistant). In the subject line, type \"urgent\" followed by a word or phrase describing the situation (For example, write \"Urgent: Out of antiseuzre med and need refill\" or \"Urgent: Severe side effects to new meds\". In doing this, our staff can ensure urgent messages are triaged appropriately and communicated to Dr. Palomo that day.  Call [facility] main line at [phone]. Dr. Palomo's voicemail extension is [phone]. When leaving a voice message, specifically indicate if it is urgent (or non-urgent) so that the matter can be triaged appropriately and addressed in a timely manner    Thank you for entrusting your neurological care to [facility] and we look forward to continuing to serve you.   "

## 2024-03-14 ENCOUNTER — NON-PROVIDER VISIT (OUTPATIENT)
Dept: INTERNAL MEDICINE | Facility: IMAGING CENTER | Age: 79
End: 2024-03-14
Payer: MEDICARE

## 2024-03-14 DIAGNOSIS — Z20.822 ENCOUNTER FOR LABORATORY TESTING FOR COVID-19 VIRUS: ICD-10-CM

## 2024-03-14 LAB
FLUAV RNA SPEC QL NAA+PROBE: NEGATIVE
FLUBV RNA SPEC QL NAA+PROBE: NEGATIVE
RSV RNA SPEC QL NAA+PROBE: NEGATIVE
SARS-COV-2 RNA RESP QL NAA+PROBE: NEGATIVE

## 2024-03-14 PROCEDURE — 0241U POCT CEPHEID COV-2, FLU A/B, RSV - PCR: CPT | Performed by: FAMILY MEDICINE

## 2024-03-14 RX ORDER — OSELTAMIVIR PHOSPHATE 75 MG/1
75 CAPSULE ORAL 2 TIMES DAILY
Qty: 10 CAPSULE | Refills: 0 | Status: SHIPPED | OUTPATIENT
Start: 2024-03-14

## 2024-05-02 ENCOUNTER — OFFICE VISIT (OUTPATIENT)
Dept: INTERNAL MEDICINE | Facility: IMAGING CENTER | Age: 79
End: 2024-05-02
Payer: MEDICARE

## 2024-05-02 VITALS
DIASTOLIC BLOOD PRESSURE: 64 MMHG | OXYGEN SATURATION: 100 % | HEART RATE: 66 BPM | SYSTOLIC BLOOD PRESSURE: 106 MMHG | TEMPERATURE: 98.3 F | RESPIRATION RATE: 14 BRPM | BODY MASS INDEX: 22.79 KG/M2 | HEIGHT: 66 IN

## 2024-05-02 DIAGNOSIS — T78.1XXA FOOD SENSITIVITY WITH GASTROINTESTINAL SYMPTOMS: ICD-10-CM

## 2024-05-02 DIAGNOSIS — R42 LIGHTHEADEDNESS: ICD-10-CM

## 2024-05-02 PROCEDURE — 3074F SYST BP LT 130 MM HG: CPT | Performed by: FAMILY MEDICINE

## 2024-05-02 PROCEDURE — 3078F DIAST BP <80 MM HG: CPT | Performed by: FAMILY MEDICINE

## 2024-05-02 PROCEDURE — 99214 OFFICE O/P EST MOD 30 MIN: CPT | Performed by: FAMILY MEDICINE

## 2024-05-02 RX ORDER — EPINEPHRINE 0.3 MG/.3ML
INJECTION SUBCUTANEOUS
Qty: 1 EACH | Refills: 0 | Status: SHIPPED | OUTPATIENT
Start: 2024-05-02

## 2024-05-02 NOTE — PROGRESS NOTES
Verbal consent was acquired by the patient to use [employer] ambient listening note generation during this visit     Patient is a 79 y.o.female.established patient   History of Present Illness  The patient is an 79-year-old female who presents for evaluation of intermittent  GI distress  She reports a lot of belching and bloating very frequently  She already takes omeprazole in the morning and 20 mg of Pepcid at night        She did do an Daria well food sensitivity test which showed sensitivity to cows milk, yogurt and both egg whites and egg yolks  She also registered as sensitivity to vanilla          The patient reported experiencing stomach cramps and diarrhea following a meal out on 02/02/2024.  That meal consisted of eggs cruz and avocado        On 04/03/2024, she experienced the same reaction two hours later, coinciding with a home dinner consisting of ice, chicken, sour cream, and vanilla ice cream.     When  consuming a cream sauce for cauliflower last week, she experienced no adverse reactions.     This morning, she experienced, sudden onset diarrhea with stomach cramps and lightheadedness after consuming  two cups of coffee and a Twinky.    She also reported  lightheadedness and didn't feel right. This had not happened before with her reactions, but her blood pressure readings at home were normal.    +++    She recently incorporated a protein mix with soy milk into her diet to promote weight loss, which she tolerated well.   Her reactions were typically postprandially.   She has been attempting to reduce her food intake and is considering allergy testing.   She is curious if her shellfish allergy could exacerbate her rash.   She previously consulted Dr. Griffin for her seasonal allergies but does not currently have an EpiPen.     She has a history of rashes and hives, which led to a discontinuation of fish oil. After discontinuation, her hives resolved, but they recurred. She discontinued another  "supplement containing omega-3. She continues to experience rashes and hives, predominantly on her buttocks and thighs. She found some relief after changing her soap .   She also reports itching under her skin and a spot on her back. She uses  top. cortisone for relief.      She consumes decaffeinated coffee and is curious if this could be beneficial.    Supplemental Information    Her lightheadedness is fine right now.   It happened this morning.   She did not have it with any of these other episodes.  Does endorse anxiety about upcoming trip to [place] and how this may affect that      She has a follow-up appointment with her neurologist in 07/2024. He found nothing when they did the EEG.   She has an issue with vision slanting away on the right side.       Initially, he was   thinking seizures.   He talked about migraines. She has minor sinus headaches.  She has not had another aura since 07/2023.   Her mother and   her daughters have migraines.            /64 (BP Location: Left arm, Patient Position: Standing)   Pulse 66   Temp 36.8 °C (98.3 °F)   Resp 14   Ht 1.664 m (5' 5.51\")   SpO2 100% , Body mass index is 22.79 kg/m².    Physical Exam  Vital Signs  The patient's oxygen saturation is 100 percent.    Blood Pressure : 106/64, Pulse: 66, Respiration: 14, Temperature: 36.8 °C (98.3 °F), Height: 166.4 cm (5' 5.51\"), Pulse Oximetry: 100 %    Physical Exam  Constitutional:       General: She is not in acute distress.     Appearance: Normal appearance. She is not ill-appearing, toxic-appearing or diaphoretic.   HENT:      Head: Normocephalic and atraumatic.   Eyes:      Conjunctiva/sclera: Conjunctivae normal.   Cardiovascular:      Rate and Rhythm: Normal rate.   Pulmonary:      Effort: Pulmonary effort is normal.   Musculoskeletal:      Cervical back: Neck supple.   Neurological:      Mental Status: She is alert and oriented to person, place, and time. Mental status is at baseline.   Psychiatric:         " Mood and Affect: Mood normal.         Behavior: Behavior normal.         Thought Content: Thought content normal.         Judgment: Judgment normal.             Results             Assessment & Plan  1. Food sensitivity.  The patient's sensitivity to [place], egg yolk, cow's milk, and yogurt, is identified in her allergy list. The patient is advised to take two 20 mg Pepcid tablets at night and to maintain her omeprazole intake in the morning. A referral to an allergist at [facility] has been initiated. An EpiPen has been prescribed.    2. Lightheadedness.  The patient's lightheadedness could potentially be attributed to anxiety.               This note was created using voice recognition software (Dragon). The accuracy of the dictation is limited by the abilities of the software. I have reviewed the note prior to signing, however some errors in grammar and context are still possible. If you have any questions related to this note please do not hesitate to contact our office.

## 2024-05-24 ENCOUNTER — APPOINTMENT (OUTPATIENT)
Dept: INTERNAL MEDICINE | Facility: IMAGING CENTER | Age: 79
End: 2024-05-24
Payer: MEDICARE

## 2024-05-30 ENCOUNTER — OFFICE VISIT (OUTPATIENT)
Dept: INTERNAL MEDICINE | Facility: IMAGING CENTER | Age: 79
End: 2024-05-30
Payer: MEDICARE

## 2024-05-30 VITALS
RESPIRATION RATE: 14 BRPM | OXYGEN SATURATION: 98 % | DIASTOLIC BLOOD PRESSURE: 60 MMHG | SYSTOLIC BLOOD PRESSURE: 122 MMHG | TEMPERATURE: 98.5 F | HEART RATE: 77 BPM

## 2024-05-30 DIAGNOSIS — T14.8XXA WOUND INFECTION: ICD-10-CM

## 2024-05-30 DIAGNOSIS — L08.9 WOUND INFECTION: ICD-10-CM

## 2024-05-30 DIAGNOSIS — I10 PRIMARY HYPERTENSION: ICD-10-CM

## 2024-05-30 RX ORDER — DOXYCYCLINE HYCLATE 100 MG
100 TABLET ORAL 2 TIMES DAILY
Qty: 20 TABLET | Refills: 0 | Status: SHIPPED | OUTPATIENT
Start: 2024-05-30

## 2024-05-30 NOTE — PROGRESS NOTES
Verbal consent was acquired by the patient to use [employer] ambient listening note generation during this visit     Patient is a 79 y.o.female.established patient      History of Present Illness  The patient presents for evaluation of a wound on her left forearm. She is accompanied by an adult male.    The patient sustained a wound to her left forearm while in [place] on 05/18/2024  .     Despite the application of Bactroban ointment since last week, the wound remains tender and erythematous, albeit less severe than before.     The erythema persists, but there is no exudate.     Upon removal of the Band-Aid, she began washing the area, applying Bactroban, and refraining from reapplying the Band-Aid, the wound began to exhibit crusting. She does not recall the last time she took oral antibiotics.    Supplemental Information  She had COVID-19 in 09/2023.            /60   Pulse 77   Temp 36.9 °C (98.5 °F)   Resp 14   SpO2 98% , There is no height or weight on file to calculate BMI.    Physical Exam      Physical Exam  Constitutional:       General: She is not in acute distress.     Appearance: Normal appearance. She is not ill-appearing, toxic-appearing or diaphoretic.   HENT:      Head: Normocephalic and atraumatic.   Eyes:      Conjunctiva/sclera: Conjunctivae normal.   Cardiovascular:      Rate and Rhythm: Normal rate.   Pulmonary:      Effort: Pulmonary effort is normal.   Musculoskeletal:      Cervical back: Neck supple.      Comments: Rt forearm wound w. Central granulation-dry  Approx 1 cm of surrounding erythema   Neurological:      Mental Status: She is alert and oriented to person, place, and time. Mental status is at baseline.   Psychiatric:         Mood and Affect: Mood normal.         Behavior: Behavior normal.         Thought Content: Thought content normal.         Judgment: Judgment normal.             Results            Assessment & Plan  1. Wound on the left forearm.  A prescription for  doxycycline, to be taken for a duration of 7-10 days, has been issued. In the event that the wound is not tender and the redness subsides after a 7-day course of doxycycline, the patient is advised to discontinue its use. However, if the wound does not exhibit signs of tenderness, the patient is advised to continue the medication for a 10-day course.      2. HTN--stable continue current regimen    Follow-up  The patient is scheduled for a follow-up visit either in 06/2024 or 07/2024.               This note was created using voice recognition software (Dragon). The accuracy of the dictation is limited by the abilities of the software. I have reviewed the note prior to signing, however some errors in grammar and context are still possible. If you have any questions related to this note please do not hesitate to contact our office.

## 2024-07-15 ENCOUNTER — TELEPHONE (OUTPATIENT)
Dept: INTERNAL MEDICINE | Facility: IMAGING CENTER | Age: 79
End: 2024-07-15
Payer: MEDICARE

## 2024-07-15 DIAGNOSIS — Z79.899 MEDICATION MANAGEMENT: ICD-10-CM

## 2024-07-16 ENCOUNTER — HOSPITAL ENCOUNTER (OUTPATIENT)
Facility: MEDICAL CENTER | Age: 79
End: 2024-07-16
Attending: FAMILY MEDICINE
Payer: MEDICARE

## 2024-07-16 ENCOUNTER — NON-PROVIDER VISIT (OUTPATIENT)
Dept: INTERNAL MEDICINE | Facility: IMAGING CENTER | Age: 79
End: 2024-07-16
Payer: MEDICARE

## 2024-07-16 DIAGNOSIS — Z79.899 MEDICATION MANAGEMENT: ICD-10-CM

## 2024-07-16 LAB
25(OH)D3 SERPL-MCNC: 51 NG/ML (ref 30–100)
ALBUMIN SERPL BCP-MCNC: 4 G/DL (ref 3.2–4.9)
ALBUMIN/GLOB SERPL: 1.4 G/DL
ALP SERPL-CCNC: 67 U/L (ref 30–99)
ALT SERPL-CCNC: 17 U/L (ref 2–50)
ANION GAP SERPL CALC-SCNC: 11 MMOL/L (ref 7–16)
AST SERPL-CCNC: 26 U/L (ref 12–45)
BASOPHILS # BLD AUTO: 1.5 % (ref 0–1.8)
BASOPHILS # BLD: 0.07 K/UL (ref 0–0.12)
BILIRUB SERPL-MCNC: 1.1 MG/DL (ref 0.1–1.5)
BUN SERPL-MCNC: 16 MG/DL (ref 8–22)
CALCIUM ALBUM COR SERPL-MCNC: 9.6 MG/DL (ref 8.5–10.5)
CALCIUM SERPL-MCNC: 9.6 MG/DL (ref 8.5–10.5)
CHLORIDE SERPL-SCNC: 108 MMOL/L (ref 96–112)
CHOLEST SERPL-MCNC: 191 MG/DL (ref 100–199)
CO2 SERPL-SCNC: 24 MMOL/L (ref 20–33)
CREAT SERPL-MCNC: 0.94 MG/DL (ref 0.5–1.4)
EOSINOPHIL # BLD AUTO: 0.15 K/UL (ref 0–0.51)
EOSINOPHIL NFR BLD: 3.2 % (ref 0–6.9)
ERYTHROCYTE [DISTWIDTH] IN BLOOD BY AUTOMATED COUNT: 44.5 FL (ref 35.9–50)
EST. AVERAGE GLUCOSE BLD GHB EST-MCNC: 103 MG/DL
GFR SERPLBLD CREATININE-BSD FMLA CKD-EPI: 61 ML/MIN/1.73 M 2
GLOBULIN SER CALC-MCNC: 2.8 G/DL (ref 1.9–3.5)
GLUCOSE SERPL-MCNC: 93 MG/DL (ref 65–99)
HBA1C MFR BLD: 5.2 % (ref 4–5.6)
HCT VFR BLD AUTO: 37 % (ref 37–47)
HDLC SERPL-MCNC: 71 MG/DL
HGB BLD-MCNC: 13.5 G/DL (ref 12–16)
IMM GRANULOCYTES # BLD AUTO: 0.01 K/UL (ref 0–0.11)
IMM GRANULOCYTES NFR BLD AUTO: 0.2 % (ref 0–0.9)
LDLC SERPL CALC-MCNC: 109 MG/DL
LYMPHOCYTES # BLD AUTO: 1.36 K/UL (ref 1–4.8)
LYMPHOCYTES NFR BLD: 28.9 % (ref 22–41)
MCH RBC QN AUTO: 33.8 PG (ref 27–33)
MCHC RBC AUTO-ENTMCNC: 36.5 G/DL (ref 32.2–35.5)
MCV RBC AUTO: 92.5 FL (ref 81.4–97.8)
MONOCYTES # BLD AUTO: 0.4 K/UL (ref 0–0.85)
MONOCYTES NFR BLD AUTO: 8.5 % (ref 0–13.4)
NEUTROPHILS # BLD AUTO: 2.71 K/UL (ref 1.82–7.42)
NEUTROPHILS NFR BLD: 57.7 % (ref 44–72)
NRBC # BLD AUTO: 0 K/UL
NRBC BLD-RTO: 0 /100 WBC (ref 0–0.2)
PLATELET # BLD AUTO: 251 K/UL (ref 164–446)
PMV BLD AUTO: 10.3 FL (ref 9–12.9)
POTASSIUM SERPL-SCNC: 3.9 MMOL/L (ref 3.6–5.5)
PROT SERPL-MCNC: 6.8 G/DL (ref 6–8.2)
RBC # BLD AUTO: 4 M/UL (ref 4.2–5.4)
SODIUM SERPL-SCNC: 143 MMOL/L (ref 135–145)
TRIGL SERPL-MCNC: 57 MG/DL (ref 0–149)
TSH SERPL-ACNC: 2.82 UIU/ML (ref 0.35–5.5)
VIT B12 SERPL-MCNC: 307 PG/ML (ref 211–911)
WBC # BLD AUTO: 4.7 K/UL (ref 4.8–10.8)

## 2024-07-16 PROCEDURE — 80053 COMPREHEN METABOLIC PANEL: CPT

## 2024-07-16 PROCEDURE — 85025 COMPLETE CBC W/AUTO DIFF WBC: CPT

## 2024-07-16 PROCEDURE — 80061 LIPID PANEL: CPT

## 2024-07-16 PROCEDURE — 99999 PR NO CHARGE: CPT

## 2024-07-16 PROCEDURE — 82607 VITAMIN B-12: CPT

## 2024-07-16 PROCEDURE — 82306 VITAMIN D 25 HYDROXY: CPT

## 2024-07-16 PROCEDURE — 83036 HEMOGLOBIN GLYCOSYLATED A1C: CPT

## 2024-07-16 PROCEDURE — 84443 ASSAY THYROID STIM HORMONE: CPT

## 2024-07-23 ENCOUNTER — HOSPITAL ENCOUNTER (OUTPATIENT)
Facility: MEDICAL CENTER | Age: 79
End: 2024-07-23
Attending: FAMILY MEDICINE
Payer: MEDICARE

## 2024-07-23 ENCOUNTER — OFFICE VISIT (OUTPATIENT)
Dept: INTERNAL MEDICINE | Facility: IMAGING CENTER | Age: 79
End: 2024-07-23
Payer: MEDICARE

## 2024-07-23 VITALS
OXYGEN SATURATION: 100 % | DIASTOLIC BLOOD PRESSURE: 74 MMHG | WEIGHT: 138 LBS | BODY MASS INDEX: 22.18 KG/M2 | HEIGHT: 66 IN | SYSTOLIC BLOOD PRESSURE: 126 MMHG | RESPIRATION RATE: 14 BRPM | TEMPERATURE: 97.8 F | HEART RATE: 67 BPM

## 2024-07-23 DIAGNOSIS — K21.9 GASTROESOPHAGEAL REFLUX DISEASE WITHOUT ESOPHAGITIS: ICD-10-CM

## 2024-07-23 DIAGNOSIS — I10 PRIMARY HYPERTENSION: ICD-10-CM

## 2024-07-23 DIAGNOSIS — J34.89 NASAL SORE: ICD-10-CM

## 2024-07-23 DIAGNOSIS — K44.9 HIATAL HERNIA: ICD-10-CM

## 2024-07-23 PROCEDURE — 3078F DIAST BP <80 MM HG: CPT | Performed by: FAMILY MEDICINE

## 2024-07-23 PROCEDURE — 87070 CULTURE OTHR SPECIMN AEROBIC: CPT

## 2024-07-23 PROCEDURE — 3074F SYST BP LT 130 MM HG: CPT | Performed by: FAMILY MEDICINE

## 2024-07-23 PROCEDURE — 87186 SC STD MICRODIL/AGAR DIL: CPT

## 2024-07-23 PROCEDURE — 87205 SMEAR GRAM STAIN: CPT

## 2024-07-23 PROCEDURE — 99215 OFFICE O/P EST HI 40 MIN: CPT | Performed by: FAMILY MEDICINE

## 2024-07-23 PROCEDURE — 87077 CULTURE AEROBIC IDENTIFY: CPT | Mod: 91

## 2024-07-23 ASSESSMENT — FIBROSIS 4 INDEX: FIB4 SCORE: 2.03

## 2024-07-24 LAB
GRAM STN SPEC: NORMAL
SIGNIFICANT IND 70042: NORMAL
SITE SITE: NORMAL
SOURCE SOURCE: NORMAL

## 2024-07-26 ENCOUNTER — PATIENT MESSAGE (OUTPATIENT)
Dept: INTERNAL MEDICINE | Facility: IMAGING CENTER | Age: 79
End: 2024-07-26
Payer: MEDICARE

## 2024-07-26 LAB
BACTERIA WND AEROBE CULT: ABNORMAL
BACTERIA WND AEROBE CULT: ABNORMAL
GRAM STN SPEC: ABNORMAL
SIGNIFICANT IND 70042: ABNORMAL
SITE SITE: ABNORMAL
SOURCE SOURCE: ABNORMAL

## 2024-08-06 RX ORDER — IRBESARTAN 75 MG/1
75 TABLET ORAL DAILY
Qty: 90 TABLET | Refills: 3 | Status: SHIPPED | OUTPATIENT
Start: 2024-08-06

## 2024-08-18 DIAGNOSIS — K21.9 GASTROESOPHAGEAL REFLUX DISEASE WITHOUT ESOPHAGITIS: ICD-10-CM

## 2024-09-16 ENCOUNTER — OFFICE VISIT (OUTPATIENT)
Dept: NEUROLOGY | Facility: MEDICAL CENTER | Age: 79
End: 2024-09-16
Attending: STUDENT IN AN ORGANIZED HEALTH CARE EDUCATION/TRAINING PROGRAM
Payer: MEDICARE

## 2024-09-16 VITALS
RESPIRATION RATE: 16 BRPM | DIASTOLIC BLOOD PRESSURE: 60 MMHG | BODY MASS INDEX: 23.07 KG/M2 | TEMPERATURE: 98.6 F | HEIGHT: 66 IN | WEIGHT: 143.52 LBS | OXYGEN SATURATION: 97 % | HEART RATE: 65 BPM | SYSTOLIC BLOOD PRESSURE: 128 MMHG

## 2024-09-16 DIAGNOSIS — R42 DYSEQUILIBRIUM: ICD-10-CM

## 2024-09-16 DIAGNOSIS — I10 PRIMARY HYPERTENSION: ICD-10-CM

## 2024-09-16 DIAGNOSIS — M50.30 DDD (DEGENERATIVE DISC DISEASE), CERVICAL: ICD-10-CM

## 2024-09-16 DIAGNOSIS — M51.369 DDD (DEGENERATIVE DISC DISEASE), LUMBAR: ICD-10-CM

## 2024-09-16 DIAGNOSIS — H53.9 VISUAL AURA: ICD-10-CM

## 2024-09-16 DIAGNOSIS — K21.9 GASTROESOPHAGEAL REFLUX DISEASE WITHOUT ESOPHAGITIS: ICD-10-CM

## 2024-09-16 DIAGNOSIS — D32.0 MENINGIOMA, CEREBRAL (HCC): ICD-10-CM

## 2024-09-16 DIAGNOSIS — R40.4 TRANSIENT ALTERATION OF AWARENESS: ICD-10-CM

## 2024-09-16 DIAGNOSIS — M48.02 CERVICAL STENOSIS OF SPINAL CANAL: ICD-10-CM

## 2024-09-16 PROCEDURE — 3074F SYST BP LT 130 MM HG: CPT | Performed by: STUDENT IN AN ORGANIZED HEALTH CARE EDUCATION/TRAINING PROGRAM

## 2024-09-16 PROCEDURE — 3078F DIAST BP <80 MM HG: CPT | Performed by: STUDENT IN AN ORGANIZED HEALTH CARE EDUCATION/TRAINING PROGRAM

## 2024-09-16 PROCEDURE — 99212 OFFICE O/P EST SF 10 MIN: CPT | Performed by: STUDENT IN AN ORGANIZED HEALTH CARE EDUCATION/TRAINING PROGRAM

## 2024-09-16 PROCEDURE — 99214 OFFICE O/P EST MOD 30 MIN: CPT | Performed by: STUDENT IN AN ORGANIZED HEALTH CARE EDUCATION/TRAINING PROGRAM

## 2024-09-16 ASSESSMENT — FIBROSIS 4 INDEX: FIB4 SCORE: 2.03

## 2024-09-16 NOTE — PROGRESS NOTES
NEUROLOGY FOLLOW-UP - 09/16/2024     REASON FOR VISIT: Heriberto Nichols 79 y.o. female presents today for follow-up       SUMMARY RELEVANT PAST MEDICAL HISTORY AND/OR COMPENDIUM OF RELEVANT WORK-UP AND TREATMENTS TO DATE:  Patient had new onset visual auras back in July 2023, happened a few times.      She saw colors, left visual field from right eye, it was a crescent. Unclear if colors were in both eyes. Lasted an hour at times. She does not remember if they were associated with headaches. NO other neurological symptoms.     She does has a history of headaches.     She has family hx of migraines (daughters).     Her BP is notably elevated      DIAGNOSTICS    24 hour aEEG December 2023: WNL    MRI Corey w/wo Dec 2023: - I personally reviewed these images and agree with the report as written  1.  Mild cerebral atrophy. Age-appropriate.  2.  6 mm incidental meningioma right supraorbital anterior frontal region.  3.  No acute infarction, hemorrhage, or evidence of posterior reversible encephalopathy syndrome. No hippocampal sclerosis. No epileptogenic FOCUS is identified.        Clinic visit March 2024: No recurrence of symptoms    INTERVAL HISTORY:  Patient returns for follow-up.  She is doing well neurologically.  No changes to her neurological health.  She has not had a return of vision abnormalities that initially brought her to my clinic.  She has seen her eye doctor recently and there are no concerns at that time.  She denies any head pain, headaches that are different or new.  No episodes concerning for seizures such as lapses in awareness, sudden confusion, impaired concentration, personality changes, or tonic-clonic seizures.  I again reviewed her MRI brain that was done December of last year.  She does report some occasional abnormal sensation around the eye but this has been chronic and the symptoms are variable.  She also reports uncomfortable symptoms of feeling like she is slanting to the right  whenever she gets a new prescription glasses which is an uncomfortable feeling and takes time for her to get used to.  This also has been going on for many years    CURRENT MEDICATIONS AT THE TIME OF THIS ENCOUNTER:  Current Outpatient Medications on File Prior to Visit   Medication Sig Dispense Refill    omeprazole (PRILOSEC) 20 MG delayed-release capsule TAKE 1 CAPSULE BY MOUTH EVERY DAY 90 Capsule 3    irbesartan (AVAPRO) 75 MG tablet TAKE 1 TABLET BY MOUTH EVERY DAY 90 Tablet 3    metoprolol tartrate (LOPRESSOR) 25 MG Tab TAKE 1 TABLET BY MOUTH TWICE A  Tablet 3    mupirocin (BACTROBAN) 2 % Ointment Apply 1 Application topically 2 times a day. 22 g 0    EPINEPHrine (EPIPEN) 0.3 MG/0.3ML Solution Auto-injector solution for injection Inject 0.3 mL into the thigh one time for 1 dose 1 Each 0    oseltamivir (TAMIFLU) 75 MG Cap Take 1 Capsule by mouth 2 times a day. 10 Capsule 0    carboxymethylcellulose (REFRESH TEARS) 0.5 % Solution 1 Drop as needed.      Turmeric 500 MG Cap Take 1,000 mg by mouth every day.      Cholecalciferol (VITAMIN D) 50 MCG (2000 UT) Cap Take 1,000 Units by mouth 2 times a day.      hyoscyamine (LEVSIN) 0.125 MG tablet Take 1 Tablet by mouth every 6 hours as needed for Cramping. 30 Tablet 1    Mirabegron ER (MYRBETRIQ) 50 MG TABLET SR 24 HR Take 1 Tablet by mouth every day. 90 Tablet 3    cycloSPORINE (RESTASIS) 0.05 % ophthalmic emulsion INSTILL 1 DROP INTO BOTH EYES EVERY 12 HOURS PA DENIED      mupirocin (BACTROBAN) 2 % Ointment Apply 1 Application topically 2 times a day. 22 g 3    Ivermectin 1 % Cream APPLY 1 APPLICATION ON THE SKIN DAILY APPLY TO THE AFFECTED AREA ONCE DAILY      fluticasone (FLONASE) 50 MCG/ACT nasal spray Administer 2 Sprays into affected nostril(S) every day. 9.9 mL 6    FINACEA 15 % Gel 1 APPLICATION APPLY ON THE SKIN TWICE A DAY  6    TRAVATAN Z 0.004 % Solution INSTILL 1 GTT IN OU HS  3    metronidazole (METROLOTION) 0.75 % Lotion   6    LUTEIN PO Take 1  "Tab by mouth every day.      Calcium Carbonate-Vitamin D (CALCIUM + D PO) Take 1 Tablet by mouth every day.      doxycycline (VIBRAMYCIN) 100 MG Tab Take 1 Tablet by mouth 2 times a day. (Patient not taking: Reported on 9/16/2024) 20 Tablet 0    azelastine (ASTELIN) 137 MCG/SPRAY nasal spray Administer 1 Spray into affected nostril(S) 2 times a day. (Patient not taking: Reported on 3/8/2024) 30 mL 0     No current facility-administered medications on file prior to visit.          EXAM:   /60 (BP Location: Left arm, Patient Position: Sitting, BP Cuff Size: Adult)   Pulse 65   Temp 37 °C (98.6 °F) (Temporal)   Resp 16   Ht 1.676 m (5' 6\")   Wt 65.1 kg (143 lb 8.3 oz)   SpO2 97%    Wt Readings from Last 5 Encounters:   09/16/24 65.1 kg (143 lb 8.3 oz)   07/23/24 62.6 kg (138 lb)   03/08/24 63.1 kg (139 lb 1.8 oz)   01/30/24 65.3 kg (144 lb)   01/22/24 65.3 kg (144 lb)      Physical Exam:  Physical Exam  Constitutional:       General: She is not in acute distress.     Appearance: She is not ill-appearing.   HENT:      Head: Normocephalic and atraumatic.      Nose: Nose normal. No congestion or rhinorrhea.      Mouth/Throat:      Mouth: Mucous membranes are moist.      Pharynx: No oropharyngeal exudate or posterior oropharyngeal erythema.   Eyes:      Extraocular Movements: EOM normal. No nystagmus.   Cardiovascular:      Rate and Rhythm: Normal rate and regular rhythm.      Pulses: Normal pulses.      Heart sounds: Normal heart sounds.   Pulmonary:      Effort: Pulmonary effort is normal.      Breath sounds: Normal breath sounds. No wheezing.   Musculoskeletal:         General: No swelling or tenderness. Normal range of motion.      Cervical back: Normal range of motion and neck supple. No rigidity.   Skin:     General: Skin is warm and dry.      Coloration: Skin is not jaundiced.      Findings: No erythema or lesion.   Neurological:      Mental Status: She is alert.      Motor: Motor strength is normal.     " Coordination: Coordination is intact.   Psychiatric:         Speech: Speech normal.        Neurological Exam   Neurological Exam  Mental Status  Alert. Speech is normal. Language is fluent with no aphasia. Attention and concentration are normal. Fund of knowledge is appropriate for level of education.    Cranial Nerves  CN II: Right funduscopic exam: not visualized. Left funduscopic exam: not visualized.  CN III, IV, VI: Extraocular movements intact bilaterally. No nystagmus. Hypometric saccades. Diminished smooth pursuit.   Right pupil: 3 mm. Round. Reactive to light. Reactive to accommodation.   Left pupil: 3 mm. Round. Reactive to light. Reactive to accommodation.  Relative afferent pupillary defect absent.  CN V: Facial sensation is normal.  CN VII: Full and symmetric facial movement.  CN IX, X: Palate elevates symmetrically  CN XI: Shoulder shrug strength is normal.  CN XII: Tongue midline without atrophy or fasciculations.    Motor  Normal muscle bulk throughout. No fasciculations present. Normal muscle tone. No abnormal involuntary movements. Strength is 5/5 throughout all four extremities.    Sensory  Light touch is normal in upper and lower extremities.     Reflexes  Diffuse depressed.    Coordination    Finger-to-nose, rapid alternating movements and heel-to-shin normal bilaterally without dysmetria.    Gait  Casual gait is normal including stance, stride, and arm swing.         ASSESSMENT, EDUCATION, AND COUNSELING:  This is a 79 y.o. female patient who presents to the neurology clinic. We had an extensive discussion about the patient's symptoms, signs, and work-up to date, if any. We discussed potential and/or definitive diagnoses, work-up, and potential treatments.     PLAN:  If applicable, the work-up such as labs, imaging, procedures, and/or other testing, referrals, and/or recommended treatment strategies are listed below.    Medications were administered today in clinic (if any):     Visit Diagnoses  "    ICD-10-CM   1. Meningioma, cerebral (HCC)  D32.0   2. Primary hypertension  I10   3. Visual aura  H53.9   4. Transient alteration of awareness  R40.4   5. DDD (degenerative disc disease), lumbar  M51.36   6. Cervical stenosis of spinal canal  M48.02   7. Gastroesophageal reflux disease without esophagitis  K21.9   8. DDD (degenerative disc disease), cervical  M50.30   9. Dysequilibrium  R42      Orders Placed This Encounter    Referral back to PCP        Patient with changes in vision, visual auras late last year who returns for follow-up.  She has not had any symptoms for 9 months now.  Did discuss the option of getting an updated MRI brain with and without contrast as a surveillance to the known small meningioma that was identified in the right frontal lobe just superior to the right orbital socket.  Patient has a nonfocal neurological exam and does not have any \"red flag\" symptoms to warrant further diagnostics at this time.  Patient would like to forego any additional imaging especially if there is a low likelihood of her needing treatment and/or surgical intervention..  I told her that it was not unreasonable to forego an MRI of the brain and follow-up only as needed or if symptoms arise including but not limited to new or worsening headaches, altered mentation, seizures, vision changes particularly those involving the right eye, focal neurological deficits (weakness or numbness on one side of the face or body).  Will follow-up as needed.  Patient was most comfortable with this plan.        BILLING DOCUMENTATION:     The number of minutes of face-to-face time spent in this encounter was I spent a total of 35 minutes on the day of the visit.  . Over 50% of the time of the visit today was spent on counseling and/or coordination of care wtih the patient and/or family, as outlined above in assessment in plan.    Anupam Palomo MD  Department of Neurology at [facility]  Diplomate of the " American Board of Psychiatry and Neurology, General Neurology  Diplomate of American Board of Psychiatry and Neurology, a Member Board of the American Board of Medical Subspecialties, Epilepsy  Director of [facility]'s Level III Comprehensive Epilepsy Program  Professor of Clinical Neurology, [facility].   [address], [address]  [facility] [id number] [phone]   Fax: [phone]  E-mail: [email or web address]

## 2024-09-16 NOTE — PATIENT INSTRUCTIONS
NEUROLOGY CLINIC VISIT WITH DR. PALOMO     PLEASE READ THIS ENTIRE DOCUMENT CAREFULLY AND COMPLETELY:    First and foremost, you matter to Dr. Palomo and you deserve the best care.   Dr. Palomo prides himself on providing the best possible care to all his patients. He strives to make each appointment meaningful, so that all your concerns are being addressed and all your neurological problems are being optimally treated. In order to achieve these goals for everyone, Dr. Palomo has listed important reminders and the best ways to prepare for each appointment. Please read each item carefully. Thank you!    Due to the high volume of patients we are trying to help, your physician will not be able to respond by phone or in [employer] to your routine concerns between appointments.  This does not reflect a lack of interest or concern for you or your diagnosis.  Please bring these questions and concerns to your appointment where your physician can answer.  Please relay more pressing concerns to our office, either via [employer], or by phone; if not able to reach us please visit nearby Urgent Care Center or Emergency Department.  If any emergent medical needs, please seek emergent medical help and/or call 911.    Also, please note that we are not able to fill out paperwork that might be related to your work, utility company, disability, and/or driving, among others, in between the visits.  Please schedule a dedicated appointment to address any and all paperwork.  This is not due to lack of concern or interest for your disease-related work/administrative problems, but to make sure that we provide the best possible care and to fill out your paperwork in a correct, complete, and timely manner.  ------------------------------------------------------------------------------------------  Please let our office know if you have any changes in your seizure frequency and/characteristics.     Please keep a diary of your seizures and bring it  with you to each appointment.    Please take vitamin D3 1721-2542 internation units daily.     Please abstain from driving until further notice    If you are a biological female with epilepsy who is of reproductive age, who is actively breastfeeding, and/or who infants/young children:  Please take folic acid 1 mg daily. This is an over-the-counter supplement that is recommended to prevent certain developmental problems in your baby, in case you become pregnant in the future.  It is critical that you let our office know as soon as you become pregnant or plan to become pregnant.  If you are caring for a baby/young child, please make sure to be sitting on a soft surface while holding your baby/young child, so in case you have a seizure, your baby/young child is not injured due to fall.   Please let us know if, while breastfeeding, you observe that your baby is excessively sleepy and/or has other behavioral changes. Because many antiseizure medications are collected in breast milk, some nursing babies can suffer adverse medication effects.    Please note that the following might precipitate seizures:   missed doses of antiseizure medications  being sick with a fever, stress  Fatigue  sleep deprivation or abnormal sleeping patterns  not eating regularly  not drinking enough water  drinking too much alcohol  stopping alcohol suddenly if you are currently using it on a regular/daily basis,   using recreational drugs, among others.    Please note that the following might lead to an injury or even be life-threatening in the event you have a seizure and/or lose awareness while:  being in a large body of water by yourself, such as bath, pool, lake, ocean, among others (risk of drowning)  being on unprotected heights (risk of fall)  being around and/or operating heavy machinery (risk of injury)  being around open fire/hot surfaces (risk of burns)  any other activities/circumstances, in which if you lose awareness, you might  injure yourself and/or others.  -------------------------------------------------------------------------------------------  SUDEP (SUDDEN UNEXPECTED DEATH IN EPILEPSY)  It is important that your seizures are well controlled and you have none or have them rarely. In addition to avoiding injury related to breakthrough seizures, frequent seizures increase risk of SUDEP (sudden unexpected death in epilepsy), where a person goes into a seizure and then never wakes up. The best way to prevent SUDEP is to control your seizures well.   ------------------------------------------------------------------------------------------  Please call for help (crisis line and/or 911) in case you have thoughts of harming yourself and/or others.  ------------------------------------------------------------------------------------------  INSTRUCTIONS FOR YOUR FAMILY/CAREGIVERS:  Please call 911 if the patient has a seizure longer than 2-3 minutes, if seizures are back to back without her recovering to her baseline, or she does not start recovering within 5-10 minutes after the seizure stops. During the seizure - please turn her on her side, please make sure her head is protected (for example, you should put a pillow under her head, if one is available), and please do not put anything in her mouth.   ------------------------------------------------------------------------------------------  PATIENT EXPECTATIONS,  IMPORTANT APPOINTMENT REMINDERS, AND ADDITIONAL HELPFUL TIPS:   REFILLS:   Request refills AT LEAST 1 week in advance to ensure you do not run out of medications    MyChart  It is STRONGLY encouraged that ALL patients sign up for MyChart. It is BY FAR the fastest and most convenient way for both Dr. Palomo and patients to obtain timely refills.  If you are having trouble signing up or logging into your account, staff are available to help you. Please ask a medical assistant or staff at the  to assist you.    TEST RESULS:    All labs and diagnostic test results will be reviewed at your next visit, UNLESS  Dr. Palomo determines that there are important findings on the tests need to be acted on sooner. Dr. Palomo will either call or send a message through [employer] if this is the case.    BE PREPARED PRIOR TO EVERY APPOINTMENT:  All patient are responsible for ensuring that ALL test results that were completed outside of the [employer] system have been received by our Neurology Department PRIOR to your appointment with Dr. Palomo.    IMPORTANT:  ALL images (not just the reports) must be sent and uploaded to the [employer] system. Dr. Palomo reviews all images personally prior to each visit. Ensuring that ALL the test results and test images are accessible to Dr. Palomo prior to your appointment is YOUR responsibility and an important part of making the most out of each appointment.   Bring a government-issues picture ID and an updated insurance card EVERY visit.  It is highly recommended that you bring at every visit a list of the most important topics that you want address. While it may not be possible to address all items on the list in a single visit, preparing a list will ensure that Dr. Palomo addresses the items that are most important to you and your health    PAPERWORK, DOCUMENTATION, LETTER REQUESTS:  You must notify the office ahead of your appointment of all paperwork or letter requests.   Please DO NOT wait until the last minute to make these requests. Please give all paperwork to the medical assistant at the start of the appointment and check-in process. Please note that Dr. Palomo may not be able complete some types of documentation in a single appointment or even within a single day or week. This is why it is important to communicate paperwork requests prior to your appointment and at least 2 weeks prior to any deadlines.    KNOW ALL YOU MEDICATIONS:   AT EVERY SINGLE APPOINTMENT, please bring a list of every single prescribed,  non-prescribed, and over the counter medication or supplements you are taking, including ones taken on a rare or intermittent basis.  Include the following information for each prescribed or non-prescribed medications:  Name of medication   The strength of EACH pill/capsule/tablet, etc.   The number of pills/capsules/tablets, etc taken per dose  The number and time of day that doses are taken  For every single Supplement that you take on a routine or intermittent basis, you must include:  The Brand Name   A complete list of every single ingredient, compound, vitamin, and/or mineral in each dose, along with the corresponding amounts/strengths of all ingredients, vitamins, minerals, etc., if such information is provided or known  The number of doses taken per day and time of day doses are taken  If medications are taken on an intermittent or as needed basis, please estimate how many days per week or days per month the medications are used  DO NOT just print out your medication list from [employer] or bring a list from a prior appointment or hospitalizations because the information is often often unreliable, inaccurate, outdated, and/or incomplete   The list should be printed or written  If you forget or do not have a list of all the medication, then it is acceptable, although less preferred, to bring all the bottles to the appointment     ARRIVE EARLY FOR ALL VISITS:  Please note that we are unable to accommodate late arrivals as per office policy.  YOU-the patient - (NOT a parent, spouse, or friend) must be physically present at check-in no later than 12 minutes after the scheduled appointment time, or you will be asked to reschedule   Consider scheduling a virtual appointment with Dr. Palomo through [employer] as an alternative if transportation to the clinic is difficult or unavailable   Please note, however, that virtual visits can only be scheduled after being an established patient of Dr. Palomo. All new appointments  "must be done in-person in clinic  Some insurances will not cover the cost of virtual appointments. Please check with your insurance to find out if these visits are covered    COMMUNICATING URGENT AND NON-URGENT MATTERS:  Your concerns are important and deserve to be heard and addressed. If you have an urgent matter, there are two methods that will ensure your concerns are prioritized appropriately:   Preferred method: Sign-up/Login to your [employer] account and send a message addressed to Dr. Palomo or Ary Tejeda (Dr. Palomo's assistant). In the subject line, type \"urgent\" followed by a word or phrase describing the situation (For example, write \"Urgent: Out of antiseuzre med and need refill\" or \"Urgent: Severe side effects to new meds\". In doing this, our staff can ensure urgent messages are triaged appropriately and communicated to Dr. Palomo that day.  Call [facility] main line at [phone]. Dr. Palomo's voicemail extension is [phone]. When leaving a voice message, specifically indicate if it is urgent (or non-urgent) so that the matter can be triaged appropriately and addressed in a timely manner    Thank you for entrusting your neurological care to [facility] and we look forward to continuing to serve you.   "

## 2024-10-02 ENCOUNTER — HOSPITAL ENCOUNTER (OUTPATIENT)
Dept: LAB | Facility: MEDICAL CENTER | Age: 79
End: 2024-10-02
Attending: NURSE PRACTITIONER
Payer: MEDICARE

## 2024-10-02 PROCEDURE — 36415 COLL VENOUS BLD VENIPUNCTURE: CPT

## 2024-10-02 PROCEDURE — 86003 ALLG SPEC IGE CRUDE XTRC EA: CPT | Mod: GA,91

## 2024-10-04 LAB
ANCHOVY IGE QN: <0.1 KU/L
BLUE MUSSEL IGE QN: <0.1 KU/L
CATFISH IGE QN: <0.1 KU/L
CLAM IGE QN: <0.1 KU/L
CODFISH IGE QN: <0.1 KU/L
CRAB IGE QN: <0.1 KU/L
DEPRECATED MISC ALLERGEN IGE RAST QL: NORMAL
EGG WHITE IGE QN: <0.1 KU/L
EGG YOLK IGE QN: <0.1 KU/L
FLOUNDER IGE QN: <0.1 KU/L
HALIBUT IGE QN: <0.1 KU/L
LOBSTER IGE QN: <0.1 KU/L
OYSTER IGE QN: <0.1 KU/L
PACIFIC SQUID IGE QN: <0.1 KU/L
SALMON IGE QN: <0.1 KU/L
SCALLOP IGE QN: <0.1 KU/L
SHRIMP IGE QN: <0.1 KU/L
SWORDFISH IGE QN: <0.1 KU/L
TROUT IGE QN: <0.1 KU/L
TUNA IGE QN: <0.1 KU/L

## 2024-10-11 ENCOUNTER — PATIENT MESSAGE (OUTPATIENT)
Dept: INTERNAL MEDICINE | Facility: IMAGING CENTER | Age: 79
End: 2024-10-11
Payer: MEDICARE

## 2024-10-11 DIAGNOSIS — K21.9 GASTROESOPHAGEAL REFLUX DISEASE WITHOUT ESOPHAGITIS: ICD-10-CM

## 2024-10-11 RX ORDER — OMEPRAZOLE 40 MG/1
40 CAPSULE, DELAYED RELEASE ORAL
Qty: 90 CAPSULE | Refills: 3 | Status: SHIPPED | OUTPATIENT
Start: 2024-10-11

## 2024-10-22 ENCOUNTER — HOSPITAL ENCOUNTER (OUTPATIENT)
Dept: LAB | Facility: MEDICAL CENTER | Age: 79
End: 2024-10-22
Payer: MEDICARE

## 2024-10-22 PROCEDURE — 86258 DGP ANTIBODY EACH IG CLASS: CPT | Mod: 91

## 2024-10-22 PROCEDURE — 36415 COLL VENOUS BLD VENIPUNCTURE: CPT

## 2024-10-22 PROCEDURE — 86364 TISS TRNSGLTMNASE EA IG CLAS: CPT | Mod: 91

## 2024-10-24 LAB
GLIADIN IGA SER IA-ACNC: <0.72 FLU (ref 0–4.99)
GLIADIN IGG SER IA-ACNC: <0.56 FLU (ref 0–4.99)
TTG IGA SER IA-ACNC: <1.02 FLU (ref 0–4.99)
TTG IGG SER IA-ACNC: <0.82 FLU (ref 0–4.99)

## 2024-12-17 ENCOUNTER — TELEMEDICINE (OUTPATIENT)
Dept: INTERNAL MEDICINE | Facility: IMAGING CENTER | Age: 79
End: 2024-12-17
Payer: MEDICARE

## 2024-12-17 DIAGNOSIS — M48.02 CERVICAL STENOSIS OF SPINAL CANAL: ICD-10-CM

## 2024-12-17 DIAGNOSIS — K58.9 IRRITABLE BOWEL SYNDROME, UNSPECIFIED TYPE: ICD-10-CM

## 2024-12-17 DIAGNOSIS — R42 DIZZINESS: Primary | ICD-10-CM

## 2024-12-17 PROBLEM — I49.9 ARRHYTHMIA: Status: ACTIVE | Noted: 2024-12-17

## 2024-12-17 PROCEDURE — 99215 OFFICE O/P EST HI 40 MIN: CPT | Mod: 95 | Performed by: FAMILY MEDICINE

## 2024-12-17 RX ORDER — NEOMYCIN SULFATE 500 MG/1
TABLET ORAL
COMMUNITY
Start: 2024-11-18 | End: 2024-12-17

## 2024-12-17 RX ORDER — RIFAXIMIN 550 MG/1
TABLET ORAL
COMMUNITY
Start: 2024-11-29 | End: 2024-12-17

## 2024-12-18 NOTE — PROGRESS NOTES
This evaluation was conducted via Teams using secure and encrypted videoconferencing technology. The patient was physically located  in [place], [place].   The patient's identity was confirmed and verbal consent was obtained for this telemedicine encounter.     Verbal consent was acquired by the patient to use [employer] ambient listening note generation during this visit     Patient is a 79 y.o.female.established patient     History of Present Illness  The patient is an 79-year-old female who presents via virtual visit for evaluation of dizziness, balance issues, and gastrointestinal symptoms.    She has been experiencing visual disturbances with her new prescription eyeglasses, which she describes as a slanting to the right and downward. This issue, which began earlier this year, has persisted despite attempts to resolve it through lens material changes and adjustments in progressive formula. She reports occasional dizziness, disorientation, and balance issues, but no nausea. Her reading ability remains unaffected, and her distance vision is satisfactory. She notes that her balance is sometimes compromised when wearing her prescription glasses with clip-on sunglasses, particularly when turning her neck. She has been under the care of an optom.for her eye-related issues. She is considering  if this is related to her cervical stenosis. She reports that her symptoms are not consistent or continuous and seem to resolve when she engages in other activities or removes her glasses. She recalls a similar episode approximately 3 to 4 years ago, which required multiple visits for adjustments over an extended period.    She has a long-standing history of lower lumbar and cervical stenosis, for which she receives monthly physical therapy. During her last visit, her therapist identified a painful spot in her neck, which was alleviated through treatment. She underwent extensive testing, including MRIs and x-rays, in 2010 and 2015 due  to numbness and weakness in her arm. She was referred to Dr. Nielson, a pain management specialist, who administered epidural injections in her lower back and cervical spine, resulting in significant improvement. She reports no current pain or numbness.    She has been diagnosed with irritable bowel syndrome (IBS) and has been managing it through dietary modifications. She experienced a mild reaction after consuming fish and chips about a month ago. She has an upcoming appointment with her gastroenterologist, Dr. Champagne, in 02/2025. She has been advised to follow a FODMAP diet but has not yet fully implemented it. She has been monitoring her diet and avoiding foods that trigger bloating or other reactions. She continues to consume baked goods and pasta without any issues. She uses half-and-half in her coffee and is considering trying a milk substitute. She has  tested neg. for celiac disease and received two different breath tests, after which she was prescribed antibiotics. She reports discomfort when bending over due to tight pants. She does not typically experience indigestion, sour taste, or reflux, but these symptoms occur occasionally, about 2 to 3 times a year. She continues to experience bloating after eating.    FAMILY HISTORY  Her mother had achalasia and a lot of stomach problems. Her brother has acid reflux and stomach problems. They all tend to be OCD.    ALLERGIES  The patient has no known allergies.            There were no vitals taken for this visit., There is no height or weight on file to calculate BMI.    Physical Exam      Physical Exam  Constitutional:       General: She is not in acute distress.     Appearance: Normal appearance. She is normal weight. She is not ill-appearing, toxic-appearing or diaphoretic.   HENT:      Head: Normocephalic and atraumatic.      Ears:      Comments:    Eyes:      Conjunctiva/sclera: Conjunctivae normal.   Pulmonary:      Effort: Pulmonary effort is normal.    Neurological:      General: No focal deficit present.      Mental Status: She is alert.   Psychiatric:         Mood and Affect: Mood normal.         Behavior: Behavior normal.         Thought Content: Thought content normal.         Judgment: Judgment normal.             Results            Assessment & Plan  1. Dizziness and balance issues.  Her symptoms of dizziness and balance issues may be related to her cervical stenosis. She reports that these symptoms are not consistent or continuous and seem to improve when she engages in other activities or removes her glasses. She is advised to consult with her physical therapist, Brett, to discuss the possibility of cervical vertigo and explore potential treatment options. If her physical therapist does not have a recommendation, she will inform us so that we can provide a referral to a specialist.    2. Cervical stenosis.  She has a long-standing history of lower lumbar and cervical stenosis, which has been managed with physical therapy and previous epidural injections. She reports no current pain or numbness.   If her symptoms persist or worsen, a consultation with a spine specialist may be considered to explore newer, less invasive procedures.    3. IBS/Gastrointestinal symptoms  followed by  GI office   She has been diagnosed with irritable bowel syndrome (IBS) and has been managing it through dietary modifications. She reports ongoing bloating, which occurs after eating. She has been advised to monitor her diet and avoid foods that trigger her symptoms. She has completed a course of antibiotics following breath tests and is expected to notice a reduction in food sensitivities and overall gastrointestinal symptoms. She is advised to continue her current dietary management and monitor her symptoms. If her symptoms persist, further evaluation may be necessary.    Hx:  The patient received an epidural injection in the lower back and a cervical epidural in 2015, which have  provided significant relief since then.               This note was created using voice recognition software (Dragon). The accuracy of the dictation is limited by the abilities of the software. I have reviewed the note prior to signing, however some errors in grammar and context are still possible. If you have any questions related to this note please do not hesitate to contact our office.

## 2025-01-13 RX ORDER — MIRABEGRON 50 MG/1
1 TABLET, FILM COATED, EXTENDED RELEASE ORAL DAILY
Qty: 90 TABLET | Refills: 3 | Status: SHIPPED | OUTPATIENT
Start: 2025-01-13

## 2025-01-24 ENCOUNTER — TELEPHONE (OUTPATIENT)
Dept: INTERNAL MEDICINE | Facility: IMAGING CENTER | Age: 80
End: 2025-01-24
Payer: MEDICARE

## 2025-01-24 DIAGNOSIS — Z79.899 MEDICATION MANAGEMENT: ICD-10-CM

## 2025-01-24 NOTE — TELEPHONE ENCOUNTER
----- Message from Nurse Soco MENON R.N. sent at 1/24/2025  8:56 AM PST -----  Please add fasting labs.    Thanks!

## 2025-01-27 ENCOUNTER — NON-PROVIDER VISIT (OUTPATIENT)
Dept: INTERNAL MEDICINE | Facility: IMAGING CENTER | Age: 80
End: 2025-01-27
Payer: MEDICARE

## 2025-01-27 ENCOUNTER — HOSPITAL ENCOUNTER (OUTPATIENT)
Facility: MEDICAL CENTER | Age: 80
End: 2025-01-27
Attending: FAMILY MEDICINE
Payer: MEDICARE

## 2025-01-27 DIAGNOSIS — Z79.899 MEDICATION MANAGEMENT: ICD-10-CM

## 2025-01-27 LAB
ALBUMIN SERPL BCP-MCNC: 4.1 G/DL (ref 3.2–4.9)
ALBUMIN/GLOB SERPL: 1.5 G/DL
ALP SERPL-CCNC: 65 U/L (ref 30–99)
ALT SERPL-CCNC: 15 U/L (ref 2–50)
ANION GAP SERPL CALC-SCNC: 13 MMOL/L (ref 7–16)
AST SERPL-CCNC: 20 U/L (ref 12–45)
BASOPHILS # BLD AUTO: 1.6 % (ref 0–1.8)
BASOPHILS # BLD: 0.07 K/UL (ref 0–0.12)
BILIRUB SERPL-MCNC: 1.2 MG/DL (ref 0.1–1.5)
BUN SERPL-MCNC: 18 MG/DL (ref 8–22)
CALCIUM ALBUM COR SERPL-MCNC: 9.5 MG/DL (ref 8.5–10.5)
CALCIUM SERPL-MCNC: 9.6 MG/DL (ref 8.5–10.5)
CHLORIDE SERPL-SCNC: 106 MMOL/L (ref 96–112)
CHOLEST SERPL-MCNC: 194 MG/DL (ref 100–199)
CO2 SERPL-SCNC: 23 MMOL/L (ref 20–33)
CREAT SERPL-MCNC: 1 MG/DL (ref 0.5–1.4)
EOSINOPHIL # BLD AUTO: 0.13 K/UL (ref 0–0.51)
EOSINOPHIL NFR BLD: 2.9 % (ref 0–6.9)
ERYTHROCYTE [DISTWIDTH] IN BLOOD BY AUTOMATED COUNT: 44.7 FL (ref 35.9–50)
EST. AVERAGE GLUCOSE BLD GHB EST-MCNC: 103 MG/DL
GFR SERPLBLD CREATININE-BSD FMLA CKD-EPI: 56 ML/MIN/1.73 M 2
GLOBULIN SER CALC-MCNC: 2.8 G/DL (ref 1.9–3.5)
GLUCOSE SERPL-MCNC: 90 MG/DL (ref 65–99)
HBA1C MFR BLD: 5.2 % (ref 4–5.6)
HCT VFR BLD AUTO: 41.5 % (ref 37–47)
HDLC SERPL-MCNC: 75 MG/DL
HGB BLD-MCNC: 14 G/DL (ref 12–16)
IMM GRANULOCYTES # BLD AUTO: 0 K/UL (ref 0–0.11)
IMM GRANULOCYTES NFR BLD AUTO: 0 % (ref 0–0.9)
LDLC SERPL CALC-MCNC: 106 MG/DL
LYMPHOCYTES # BLD AUTO: 1.4 K/UL (ref 1–4.8)
LYMPHOCYTES NFR BLD: 31.3 % (ref 22–41)
MCH RBC QN AUTO: 32.4 PG (ref 27–33)
MCHC RBC AUTO-ENTMCNC: 33.7 G/DL (ref 32.2–35.5)
MCV RBC AUTO: 96.1 FL (ref 81.4–97.8)
MONOCYTES # BLD AUTO: 0.45 K/UL (ref 0–0.85)
MONOCYTES NFR BLD AUTO: 10.1 % (ref 0–13.4)
NEUTROPHILS # BLD AUTO: 2.42 K/UL (ref 1.82–7.42)
NEUTROPHILS NFR BLD: 54.1 % (ref 44–72)
NRBC # BLD AUTO: 0 K/UL
NRBC BLD-RTO: 0 /100 WBC (ref 0–0.2)
PLATELET # BLD AUTO: 229 K/UL (ref 164–446)
PMV BLD AUTO: 10.4 FL (ref 9–12.9)
POTASSIUM SERPL-SCNC: 3.8 MMOL/L (ref 3.6–5.5)
PROT SERPL-MCNC: 6.9 G/DL (ref 6–8.2)
RBC # BLD AUTO: 4.32 M/UL (ref 4.2–5.4)
SODIUM SERPL-SCNC: 142 MMOL/L (ref 135–145)
TRIGL SERPL-MCNC: 66 MG/DL (ref 0–149)
TSH SERPL-ACNC: 3.07 UIU/ML (ref 0.35–5.5)
VIT B12 SERPL-MCNC: 335 PG/ML (ref 211–911)
WBC # BLD AUTO: 4.5 K/UL (ref 4.8–10.8)

## 2025-01-27 PROCEDURE — 83036 HEMOGLOBIN GLYCOSYLATED A1C: CPT

## 2025-01-27 PROCEDURE — 84443 ASSAY THYROID STIM HORMONE: CPT

## 2025-01-27 PROCEDURE — 80053 COMPREHEN METABOLIC PANEL: CPT

## 2025-01-27 PROCEDURE — 80061 LIPID PANEL: CPT

## 2025-01-27 PROCEDURE — 82607 VITAMIN B-12: CPT

## 2025-01-27 PROCEDURE — 85025 COMPLETE CBC W/AUTO DIFF WBC: CPT

## 2025-01-27 PROCEDURE — 99999 PR NO CHARGE: CPT

## 2025-02-03 ENCOUNTER — APPOINTMENT (OUTPATIENT)
Dept: INTERNAL MEDICINE | Facility: IMAGING CENTER | Age: 80
End: 2025-02-03
Payer: MEDICARE

## 2025-02-05 ENCOUNTER — OFFICE VISIT (OUTPATIENT)
Dept: INTERNAL MEDICINE | Facility: IMAGING CENTER | Age: 80
End: 2025-02-05
Payer: MEDICARE

## 2025-02-05 VITALS
OXYGEN SATURATION: 98 % | DIASTOLIC BLOOD PRESSURE: 68 MMHG | SYSTOLIC BLOOD PRESSURE: 126 MMHG | TEMPERATURE: 97.8 F | WEIGHT: 138 LBS | HEART RATE: 86 BPM | RESPIRATION RATE: 14 BRPM | HEIGHT: 66 IN | BODY MASS INDEX: 22.18 KG/M2

## 2025-02-05 DIAGNOSIS — Z79.899 MEDICATION MANAGEMENT: ICD-10-CM

## 2025-02-05 DIAGNOSIS — Z12.31 ENCOUNTER FOR SCREENING MAMMOGRAM FOR BREAST CANCER: ICD-10-CM

## 2025-02-05 DIAGNOSIS — Z00.00 MEDICARE ANNUAL WELLNESS VISIT, SUBSEQUENT: Primary | ICD-10-CM

## 2025-02-05 DIAGNOSIS — K58.9 IRRITABLE BOWEL SYNDROME, UNSPECIFIED TYPE: ICD-10-CM

## 2025-02-05 PROCEDURE — 3078F DIAST BP <80 MM HG: CPT | Performed by: FAMILY MEDICINE

## 2025-02-05 PROCEDURE — 3074F SYST BP LT 130 MM HG: CPT | Performed by: FAMILY MEDICINE

## 2025-02-05 PROCEDURE — G0439 PPPS, SUBSEQ VISIT: HCPCS | Performed by: FAMILY MEDICINE

## 2025-02-05 RX ORDER — HYOSCYAMINE SULFATE 0.12 MG/1
125 TABLET ORAL EVERY 6 HOURS PRN
Qty: 30 TABLET | Refills: 3 | Status: SHIPPED | OUTPATIENT
Start: 2025-02-05

## 2025-02-05 ASSESSMENT — ENCOUNTER SYMPTOMS: GENERAL WELL-BEING: GOOD

## 2025-02-05 ASSESSMENT — ACTIVITIES OF DAILY LIVING (ADL): BATHING_REQUIRES_ASSISTANCE: 0

## 2025-02-05 ASSESSMENT — FIBROSIS 4 INDEX: FIB4 SCORE: 1.83

## 2025-02-05 ASSESSMENT — PATIENT HEALTH QUESTIONNAIRE - PHQ9: CLINICAL INTERPRETATION OF PHQ2 SCORE: 0

## 2025-02-06 NOTE — PROGRESS NOTES
Verbal consent was acquired by the patient to use [employer] ambient listening note generation during this visit     Patient is a 79 y.o.female.established patient     History of Present Illness  The patient is an 79-year-old female who presents for annual wellness visit and discussion  of gastrointestinal issues, itching, vertigo, hearing issues, hoarseness, and medication management.    She has been experiencing gastrointestinal issues, which she attributes to her inability to adhere strictly to the FODMAP diet. Despite this, she has made efforts to reduce her intake of natural sugars and eliminate added sugars from her diet, except for desserts. She has been prescribed omeprazole 40 mg in the morning and Pepcid 40 mg at night. She has undergone two breath tests and a blood test for celiac disease- which returned negative results. She was subsequently placed on two antibiotics for breath test results. Since starting the antibiotics, she has noticed an increase in belching, burping, and bloating, as well as changes in her bowel movements and increased mucus production from the rectum. She has been taking Metamucil for several years but discontinued it temporarily due to concerns about fiber intake. During this period, she experienced softer stools and more frequent bowel movements, but no diarrhea or constipation. She has since resumed taking Metamucil. She is considering switching her Viactiv calcium supplement, which contains soy and corn syrup, to a morning dose and taking a vitamin D capsule at night to see if this alleviates her symptoms. She has been on Viactiv for a long time.    She has been experiencing a bitter taste in her mouth since Sunday, which she describes as similar to the taste of coffee. She also reports a coating on her tongue and phlegm in the back of her throat but does not have sinus congestion. She is wondering if this is related to her stomach issues. She does not experience any burning  sensation or acid reflux. She has recently noticed that her voice sounds hoarse when she speaks.    She has been experiencing pruritus on the inside of her fingers, which she attributes to inadequate moisturization. She applies moisturizer to her hands after each bathroom visit to prevent painful cracks. She has difficulty using coconut oil due to its greasy texture.    She has been experiencing vertigo, which she believes may be related to her neck issues. She has been diagnosed with cervical and vestibular vertigo and has been receiving physical therapy for these conditions. She underwent an eye test to determine if her vertigo is related to her vision issues. She has found acupressure on her neck and shoulders to be beneficial. She has been attending [employer] classes via [employer].    She has been experiencing hearing issues and has had several hearing tests over the years. She is considering getting another hearing test. She has been struggling with her glasses and is unsure if this is contributing to her vertigo.    She is currently on omeprazole, metoprolol, atorvastatin, and Myrbetriq for her bladder. She also has medications for her eyes and rosacea. She is requesting a refill of her hyoscyamine prescription for cramping and diarrhea, as her current prescription has . She has not had a mammogram in over a year and is requesting one. She reports that her urination is normal and that her medications are effective. She wears a pad occasionally, especially in cold weather or when she is dizzy.    Supplemental Information  She has been experiencing fatigue and visual aura. She has consulted a cardiologist and a neurologist, both of whom have not identified any significant issues. She was told she has a small growth above her right eye but does not have seizures. She is scheduled to see her neurologist again in 2024. She has been tested for food allergies, including skin and blood tests, all of which  returned negative results. She has been informed that she does not have celiac disease and that milk is not a problem for her. She has switched to almond milk and has been taking a protein supplement. She has been experiencing fewer headaches recently and is unsure why. She has been using half-and-half in her coffee and desserts and has recently switched from soy to almond milk.    ALLERGIES  The patient has an allergy to SHELLFISH.    MEDICATIONS  Current: Omeprazole, metoprolol, atorvastatin, Myrbetriq, Pepcid, Metamucil, Viactiv      Depression Screening  Little interest or pleasure in doing things?  0 - not at all  Feeling down, depressed , or hopeless? 0 - not at all  Patient Health Questionnaire Score: 0     If depressive symptoms identified deferred to follow up visit unless specifically addressed in assessment and plan.    Interpretation of PHQ-9 Total Score   Score Severity   1-4 No Depression   5-9 Mild Depression   10-14 Moderate Depression   15-19 Moderately Severe Depression   20-27 Severe Depression    Screening for Cognitive Impairment  Do you or any of your friends or family members have any concern about your memory? No  Three Minute Recall (Leader, Season, Table) 3/3    Den clock face with all 12 numbers and set the hands to show 10 minutes after 11.  Yes    Cognitive concerns identified deferred for follow up unless specifically addressed in assessment and plan.    Fall Risk Assessment  Has the patient had two or more falls in the last year or any fall with injury in the last year?  No    Safety Assessment  Do you always wear your seatbelt?  Yes  Any changes to home needed to function safely? No  Difficulty hearing.  Yes  Patient counseled about all safety risks that were identified.    Functional Assessment ADLs  Are there any barriers preventing you from cooking for yourself or meeting nutritional needs?  No.    Are there any barriers preventing you from driving safely or obtaining  transportation?  No.    Are there any barriers preventing you from using a telephone or calling for help?  No    Are there any barriers preventing you from shopping?  No.    Are there any barriers preventing you from taking care of your own finances?  No    Are there any barriers preventing you from managing your medications?  No    Are there any barriers preventing you from showering, bathing or dressing yourself? No    Are there any barriers preventing you from doing housework or laundry? No  Are there any barriers preventing you from using the toilet?No  Are you currently engaging in any exercise or physical activity?  Yes.      Self-Assessment of Health  What is your perception of your health? Good    Do you sleep more than six hours a night? Yes    In the past 7 days, how much did pain keep you from doing your normal work? None    Do you spend quality time with family or friends (virtually or in person)? Yes    Do you usually eat a heart healthy diet that constists of a variety of fruits, vegetables, whole grains and fiber? No    Do you eat foods high in fat and/or Fast Food more than three times per week? No    How concerned are you that your medical conditions are not being well managed? Not at all    Are you worried that in the next 2 months, you may not have stable housing that you own, rent, or stay in as part of a household? No      Advance Care Planning  Do you have an Advance Directive, Living Will, Durable Power of , or POLST? No                 Health Maintenance Summary            Overdue - Annual Wellness Visit (Yearly) Overdue since 1/30/2025 01/30/2024  Level of Service: [place] ANNUAL WELLNESS VISIT-INCLUDES PPPS SUBSEQUE*    01/30/2024  Visit Dx: Medicare annual wellness visit, subsequent    09/30/2022  Level of Service: NV ANNUAL WELLNESS VISIT-INCLUDES PPPS SUBSEQUE*    09/30/2022  Visit Dx: Medicare annual wellness visit, subsequent    09/13/2021  Level of Service: [place] ANNUAL WELLNESS  VISIT-INCLUDES PPPS SUBSEQUE*    Only the first 5 history entries have been loaded, but more history exists.              Bone Density Scan (Every 5 Years) Next due on 11/10/2026      11/10/2021  DS-BONE DENSITY STUDY (DEXA)    11/12/2019  DS-BONE DENSITY STUDY (DEXA)    10/17/2018  DS-BONE DENSITY STUDY (DEXA)    10/07/2014  Done              IMM DTaP/Tdap/Td Vaccine (4 - Td or Tdap) Next due on 12/4/2033 12/04/2023  Imm Admin: Tdap Vaccine    06/17/2013  Imm Admin: Tdap Vaccine    01/25/2008  Imm Admin: Tdap Vaccine              Pneumococcal Vaccine: 65+ Years (Series Information) Completed      11/04/2014  Imm Admin: Pneumococcal Conjugate Vaccine (Prevnar/PCV-13)    08/01/2014  Imm Admin: Pneumococcal Conjugate Vaccine (Prevnar/PCV-13)    10/01/2008  Imm Admin: Pneumococcal polysaccharide vaccine (PPSV-23)              Hepatitis A Vaccine (Hep A) (Series Information) Aged Out      04/28/2015  Imm Admin: Hepatitis A Vaccine, Adult    10/23/2014  Imm Admin: Hepatitis A Vaccine, Adult    08/09/1999  Imm Admin: Hepatitis A Vaccine, Ped/Adol              Zoster (Shingles) Vaccines (Series Information) Completed      08/31/2019  Imm Admin: Zoster Vaccine Recombinant (RZV) (SHINGRIX)    06/12/2018  Imm Admin: Zoster Vaccine Recombinant (RZV) (SHINGRIX)    06/18/2007  Imm Admin: Zoster Vaccine Live (ZVL) (Zostavax) - HISTORICAL DATA              COVID-19 Vaccine (Series Information) Completed      11/13/2024  Imm Admin: COVID-19, mRNA, LNP-S, PF, danny-sucrose, 30 mcg/0.3 mL    11/14/2023  Imm Admin: Comirnaty (Covid-19 Vaccine, Mrna, [id number] Formula)    11/03/2022  Imm Admin: PFIZER BIVALENT SARS-COV-2 VACCINE (12+)    04/15/2022  Imm Admin: PFIZER HARRELL CAP SARS-COV-2 VACCINATION (12+)    10/25/2021  Imm Admin: PFIZER PURPLE CAP SARS-COV-2 VACCINATION (12+)    Only the first 5 history entries have been loaded, but more history exists.              Influenza Vaccine (Series Information) Completed      12/12/2024   Imm Admin: Influenza high-dose trivalent (PF)    10/30/2023  Imm Admin: Influenza Vaccine Adult HD    09/20/2022  Imm Admin: Influenza Vaccine Adult HD    09/30/2021  Imm Admin: Influenza Vaccine Adult HD    09/05/2020  Imm Admin: Influenza (IM) Preservative Free - HISTORICAL DATA    Only the first 5 history entries have been loaded, but more history exists.              Hepatitis B Vaccine (Hep B) (Series Information) Aged Out      No completion history exists for this topic.              HPV Vaccines (Series Information) Aged Out      No completion history exists for this topic.              Polio Vaccine (Inactivated Polio) (Series Information) Aged Out      No completion history exists for this topic.              Meningococcal Immunization (Series Information) Aged Out      No completion history exists for this topic.              Discontinued - Mammogram  Ordered on 2/5/2025        Frequency changed to Never automatically (Topic No Longer Applies)    11/16/2023  [id number]    11/14/2022  [id number]    11/10/2021  [id number]    11/13/2020  [id number]    Only the first 5 history entries have been loaded, but more history exists.              Discontinued - Cervical Cancer Screening  Discontinued        Frequency changed to Never automatically (Topic No Longer Applies)    07/06/2010  PAP IG, RFX HPV ASCU              Discontinued - Colorectal Cancer Screening  Discontinued        Frequency changed to Never automatically (Topic No Longer Applies)    05/21/2013  Colonoscopy (Done - [facility])    05/21/2013  AMB REFERRAL TO GI FOR COLONOSCOPY    06/14/2012  OCCULT BLOOD FECES IMMUNOASSAY              Discontinued - Hepatitis C Screening  Discontinued        Frequency changed to Never automatically (Topic No Longer Applies)    10/01/2014  Hepatitis C Antibody component of HEPATITIS PANEL ACUTE(4 COMPONENTS)                    Patient Care Team:  Jayda Garcia M.D. as PCP  "- General (Family Medicine)  Soco Arzola R.N. as Registered Nurse       /68   Pulse 86   Temp 36.6 °C (97.8 °F)   Resp 14   Ht 1.664 m (5' 5.51\")   Wt 62.6 kg (138 lb)   SpO2 98% , Body mass index is 22.61 kg/m².    Physical Exam  Oral exam was performed.  Lungs are clear.  Heart was examined.  Abdominal exam was performed.    Physical Exam  Constitutional:       Appearance: Normal appearance. She is normal weight.   HENT:      Head: Normocephalic and atraumatic.      Right Ear: Tympanic membrane normal.      Left Ear: Tympanic membrane normal.      Mouth/Throat:      Pharynx: Oropharynx is clear.   Eyes:      Conjunctiva/sclera: Conjunctivae normal.   Cardiovascular:      Rate and Rhythm: Normal rate and regular rhythm.      Heart sounds: Normal heart sounds.   Pulmonary:      Effort: Pulmonary effort is normal.      Breath sounds: Normal breath sounds.   Musculoskeletal:      Cervical back: Neck supple.   Skin:     General: Skin is warm and dry.   Neurological:      General: No focal deficit present.      Mental Status: She is alert.   Psychiatric:         Mood and Affect: Mood normal.         Behavior: Behavior normal.         Thought Content: Thought content normal.         Judgment: Judgment normal.             Results       Hospital Outpatient Visit on 01/27/2025   Component Date Value Ref Range Status    WBC 01/27/2025 4.5 (L)  4.8 - 10.8 K/uL Final    RBC 01/27/2025 4.32  4.20 - 5.40 M/uL Final    Hemoglobin 01/27/2025 14.0  12.0 - 16.0 g/dL Final    Hematocrit 01/27/2025 41.5  37.0 - 47.0 % Final    MCV 01/27/2025 96.1  81.4 - 97.8 fL Final    MCH 01/27/2025 32.4  27.0 - 33.0 pg Final    MCHC 01/27/2025 33.7  32.2 - 35.5 g/dL Final    RDW 01/27/2025 44.7  35.9 - 50.0 fL Final    Platelet Count 01/27/2025 229  164 - 446 K/uL Final    MPV 01/27/2025 10.4  9.0 - 12.9 fL Final    Neutrophils-Polys 01/27/2025 54.10  44.00 - 72.00 % Final    Lymphocytes 01/27/2025 31.30  22.00 - 41.00 % Final "    Monocytes 01/27/2025 10.10  0.00 - 13.40 % Final    Eosinophils 01/27/2025 2.90  0.00 - 6.90 % Final    Basophils 01/27/2025 1.60  0.00 - 1.80 % Final    Immature Granulocytes 01/27/2025 0.00  0.00 - 0.90 % Final    Nucleated RBC 01/27/2025 0.00  0.00 - 0.20 /100 WBC Final    Neutrophils (Absolute) 01/27/2025 2.42  1.82 - 7.42 K/uL Final    Includes immature neutrophils, if present.    Lymphs (Absolute) 01/27/2025 1.40  1.00 - 4.80 K/uL Final    Monos (Absolute) 01/27/2025 0.45  0.00 - 0.85 K/uL Final    Eos (Absolute) 01/27/2025 0.13  0.00 - 0.51 K/uL Final    Baso (Absolute) 01/27/2025 0.07  0.00 - 0.12 K/uL Final    Immature Granulocytes (abs) 01/27/2025 0.00  0.00 - 0.11 K/uL Final    NRBC (Absolute) 01/27/2025 0.00  K/uL Final    TSH 01/27/2025 3.070  0.350 - 5.500 uIU/mL Final    Cholesterol,Tot 01/27/2025 194  100 - 199 mg/dL Final    Triglycerides 01/27/2025 66  0 - 149 mg/dL Final    HDL 01/27/2025 75  >=40 mg/dL Final    LDL 01/27/2025 106 (H)  <100 mg/dL Final    Sodium 01/27/2025 142  135 - 145 mmol/L Final    Potassium 01/27/2025 3.8  3.6 - 5.5 mmol/L Final    Chloride 01/27/2025 106  96 - 112 mmol/L Final    Co2 01/27/2025 23  20 - 33 mmol/L Final    Anion Gap 01/27/2025 13.0  7.0 - 16.0 Final    Glucose 01/27/2025 90  65 - 99 mg/dL Final    Bun 01/27/2025 18  8 - 22 mg/dL Final    Creatinine 01/27/2025 1.00  0.50 - 1.40 mg/dL Final    Calcium 01/27/2025 9.6  8.5 - 10.5 mg/dL Final    Correct Calcium 01/27/2025 9.5  8.5 - 10.5 mg/dL Final    AST(SGOT) 01/27/2025 20  12 - 45 U/L Final    ALT(SGPT) 01/27/2025 15  2 - 50 U/L Final    Alkaline Phosphatase 01/27/2025 65  30 - 99 U/L Final    Total Bilirubin 01/27/2025 1.2  0.1 - 1.5 mg/dL Final    Albumin 01/27/2025 4.1  3.2 - 4.9 g/dL Final    Total Protein 01/27/2025 6.9  6.0 - 8.2 g/dL Final    Globulin 01/27/2025 2.8  1.9 - 3.5 g/dL Final    A-G Ratio 01/27/2025 1.5  g/dL Final    Vitamin B12 -True Cobalamin 01/27/2025 335  211 - 911 pg/mL Final     Glycohemoglobin 01/27/2025 5.2  4.0 - 5.6 % Final    Comment: Increased risk for diabetes:  5.7 -6.4%  Diabetes:  >6.4%  Glycemic control for adults with diabetes:  <7.0%    The above interpretations are per ADA guidelines.  Diagnosis  of diabetes mellitus on the basis of elevated Hemoglobin A1c  should be confirmed by repeating the Hb A1c test.      Est Avg Glucose 01/27/2025 103  mg/dL Final    Comment: The eAG calculation is based on the A1c-Derived Daily Glucose  (ADAG) study.  See the ADA's website for additional information.      GFR (CKD-EPI) 01/27/2025 56 (A)  >60 mL/min/1.73 m 2 Final    Comment: Estimated Glomerular Filtration Rate is calculated using  race neutral CKD-EPI 2021 equation per NKF-ASN recommendations.        Laboratory Studies  A1c is normal. Blood counts are stable and normal. Platelets are good. Red cells are fine. Thyroid is fine. LDL cholesterol dropped from 109 to 106, HDL cholesterol went up from 71 to 75. Triglycerides are low. Fasting blood sugar dropped from 93 and 94 down to 90. Electrolytes are normal. Liver is normal. B12 is good. Kidney function was four points below the 60 beryl.          Assessment & Plan  Annual wellness visit-An order for a mammogram will be placed.  All labs reviewed, vaccines up to date    1. Gastrointestinal issues.-Under care of gastroenterology  Her gastrointestinal issues may be attributed to acid reflux or seasonal allergies. The hoarseness in her voice suggests a possible acid-related cause. She is advised to experiment with FiberCon, an over-the-counter synthetic fiber, to manage her bloating. If the bloating persists despite discontinuing Metamucil, she should consult with Dr. Champagne regarding potential side effects of the antibiotics she has been taking. She is also advised to monitor for any correlation between whey protein intake and migraine onset.    2. Itching.  She is advised to apply coconut oil to her skin immediately after showering to  alleviate dryness and itching.    3. Vertigo.  She has been experiencing vertigo, which may be related to her neck issues. She has been diagnosed with cervical and vestibular vertigo and has been receiving physical therapy for these conditions. She is advised to continue her physical therapy sessions and to return for further evaluation if her vertigo symptoms recur.    4. Hearing issues.  She has been experiencing hearing issues and has had several hearing tests over the years. She is considering getting another hearing test. She is advised to schedule another hearing test to evaluate her current hearing status.    5. Hoarseness.  The hoarseness in her voice suggests a possible acid-related cause. She is advised to monitor her symptoms and report any worsening or new symptoms.    6. Medication management.  She is advised to take her Viactiv calcium supplement in the morning and her vitamin D capsule at night. If this regimen does not alleviate her symptoms, she may consider switching to gummy supplements. A prescription for hyoscyamine will be sent to [facility].                Routine recheck in 6 months       This note was created using voice recognition software (Dragon). The accuracy of the dictation is limited by the abilities of the software. I have reviewed the note prior to signing, however some errors in grammar and context are still possible. If you have any questions related to this note please do not hesitate to contact our office.

## 2025-02-18 NOTE — PROGRESS NOTES
HPI:  Heriberto Nichols is a 78 y.o. here for Medicare Annual Wellness Visit       History of Present Illness       Patient reports she has had decreased energy  Chronic neck and back pain  She is on omeprazole/Pepcid for GERD    She is interested in physical therapy  She would like a refill of Myrbetriq for urinary dysfunction        Patient Active Problem List    Diagnosis Date Noted    Visual seizure (HCC) 11/29/2023    Visual aura 11/29/2023    Hot flashes due to menopause 02/18/2022    Seasonal allergies 05/18/2021    Stress incontinence 05/18/2021    Gastroesophageal reflux disease without esophagitis 08/22/2019    Benign microscopic hematuria 02/09/2017    Cervical stenosis of spinal canal 08/08/2016    DDD (degenerative disc disease), cervical 07/01/2015    DDD (degenerative disc disease), lumbar 05/04/2015    SVT (supraventricular tachycardia) (HCC) 02/09/2015    History of PUD (peptic ulcer disease) 02/07/2015    Hypertension 06/18/2013    Allergic state     Rosacea     IBS (irritable bowel syndrome)     Osteopenia     Hiatal hernia        Current Outpatient Medications   Medication Sig Dispense Refill    irbesartan (AVAPRO) 75 MG tablet TAKE 1 TABLET BY MOUTH EVERY DAY 90 Tablet 1    metoprolol tartrate (LOPRESSOR) 25 MG Tab TAKE 1 TABLET BY MOUTH TWICE A  Tablet 1    hyoscyamine (LEVSIN) 0.125 MG tablet Take 1 Tablet by mouth every 6 hours as needed for Cramping. 30 Tablet 1    Mirabegron ER (MYRBETRIQ) 50 MG TABLET SR 24 HR Take 1 Tablet by mouth every day. 90 Tablet 3    cycloSPORINE (RESTASIS) 0.05 % ophthalmic emulsion INSTILL 1 DROP INTO BOTH EYES EVERY 12 HOURS PA DENIED      azelastine (ASTELIN) 137 MCG/SPRAY nasal spray Administer 1 Spray into affected nostril(S) 2 times a day. 30 mL 0    omeprazole (PRILOSEC) 20 MG delayed-release capsule TAKE 1 CAPSULE BY MOUTH EVERY DAY 90 Capsule 3    mupirocin (BACTROBAN) 2 % Ointment Apply 1 Application topically 2 times a day. 22 g 3     Ivermectin 1 % Cream APPLY 1 APPLICATION ON THE SKIN DAILY APPLY TO THE AFFECTED AREA ONCE DAILY      fluticasone (FLONASE) 50 MCG/ACT nasal spray Administer 2 Sprays into affected nostril(S) every day. 9.9 mL 6    FINACEA 15 % Gel 1 APPLICATION APPLY ON THE SKIN TWICE A DAY  6    TRAVATAN Z 0.004 % Solution INSTILL 1 GTT IN OU HS  3    metronidazole (METROLOTION) 0.75 % Lotion 1 APPLICATION APPLY ON THE SKIN TWICE A DAY (Patient not taking: Reported on 1/22/2024)  6    LUTEIN PO Take 1 Tab by mouth every day.      Calcium Carbonate-Vitamin D (CALCIUM + D PO) Take 1 Tab by mouth every day.       No current facility-administered medications for this visit.          Current supplements as per medication list.     Allergies: Asa [aspirin], Shellfish allergy, and Fish oil    Current social contact/activities: friends/family     She  reports that she has never smoked. She has never used smokeless tobacco. She reports current alcohol use. She reports that she does not use drugs.  Counseling given: Not Answered      ROS:          Gait: Uses no assistive device  Ostomy: No  Other tubes: No  Amputations: No  Chronic oxygen use: No  Last eye exam: Up-to-date  Wears hearing aids: No   : Denies any unmanageable urinary leakage during the last 6 months       Screening:     Depression Screening  Little interest or pleasure in doing things?  0 - not at all  Feeling down, depressed , or hopeless? 0 - not at all  Patient Health Questionnaire Score: 0     If depressive symptoms identified deferred to follow up visit unless specifically addressed in assessment and plan.    Interpretation of PHQ-9 Total Score   Score Severity   1-4 No Depression   5-9 Mild Depression   10-14 Moderate Depression   15-19 Moderately Severe Depression   20-27 Severe Depression    Screening for Cognitive Impairment  Do you or any of your friends or family members have any concern about your memory? No  Three Minute Recall (Banana, Sunrise, Chair) 3/3   No Change   Den clock face with all 12 numbers and set the hands to show 20 past 8.  Yes    Cognitive concerns identified deferred for follow up unless specifically addressed in assessment and plan.    Fall Risk Assessment  Has the patient had two or more falls in the last year or any fall with injury in the last year?  No    Safety Assessment  Do you always wear your seatbelt?  Yes  Any changes to home needed to function safely? No  Difficulty hearing.  Yes  Patient counseled about all safety risks that were identified.    Functional Assessment ADLs  Are there any barriers preventing you from cooking for yourself or meeting nutritional needs?  No.    Are there any barriers preventing you from driving safely or obtaining transportation?  No.    Are there any barriers preventing you from using a telephone or calling for help?  No    Are there any barriers preventing you from shopping?  No.    Are there any barriers preventing you from taking care of your own finances?  No    Are there any barriers preventing you from managing your medications?  No    Are there any barriers preventing you from showering, bathing or dressing yourself? No    Are there any barriers preventing you from doing housework or laundry? No  Are there any barriers preventing you from using the toilet?No  Are you currently engaging in any exercise or physical activity?  Yes.      Self-Assessment of Health  What is your perception of your health? Excellent  Do you sleep more than six hours a night? Yes  In the past 7 days, how much did pain keep you from doing your normal work? Some  Do you spend quality time with family or friends (virtually or in person)? Yes  Do you usually eat a heart healthy diet that constists of a variety of fruits, vegetables, whole grains and fiber? Yes  Do you eat foods high in fat and/or Fast Food more than three times per week? No    Advance Care Planning  Do you have an Advance Directive, Living Will, Durable Power of ,  or POLST? No                 Health Maintenance Summary            Annual Wellness Visit (Yearly) Next due on 1/30/2025 01/30/2024  Level of Service: SC ANNUAL WELLNESS VISIT-INCLUDES PPPS SUBSEQUE*    01/30/2024  Visit Dx: Medicare annual wellness visit, subsequent    09/30/2022  Level of Service: SC ANNUAL WELLNESS VISIT-INCLUDES PPPS SUBSEQUE*    09/30/2022  Visit Dx: Medicare annual wellness visit, subsequent    09/13/2021  Level of Service: SC ANNUAL WELLNESS VISIT-INCLUDES PPPS SUBSEQUE*    Only the first 5 history entries have been loaded, but more history exists.              Bone Density Scan (Every 5 Years) Next due on 11/10/2026      11/10/2021  DS-BONE DENSITY STUDY (DEXA)    11/12/2019  DS-BONE DENSITY STUDY (DEXA)    10/17/2018  DS-BONE DENSITY STUDY (DEXA)    10/07/2014  Done              IMM DTaP/Tdap/Td Vaccine (4 - Td or Tdap) Next due on 12/4/2033 12/04/2023  Imm Admin: Tdap Vaccine    06/17/2013  Imm Admin: Tdap Vaccine    01/25/2008  Imm Admin: Tdap Vaccine              Pneumococcal Vaccine: 65+ Years (Series Information) Completed      11/04/2014  Imm Admin: Pneumococcal Conjugate Vaccine (Prevnar/PCV-13)    08/01/2014  Imm Admin: Pneumococcal Conjugate Vaccine (Prevnar/PCV-13)    10/01/2008  Imm Admin: Pneumococcal polysaccharide vaccine (PPSV-23)              Hepatitis A Vaccine (Hep A) (Series Information) Aged Out      04/28/2015  Imm Admin: Hepatitis A Vaccine, Adult    10/23/2014  Imm Admin: Hepatitis A Vaccine, Adult    08/09/1999  Imm Admin: Hepatitis A Vaccine, Ped/Adol              Zoster (Shingles) Vaccines (Series Information) Completed      08/31/2019  Imm Admin: Zoster Vaccine Recombinant (RZV) (SHINGRIX)    06/12/2018  Imm Admin: Zoster Vaccine Recombinant (RZV) (SHINGRIX)    06/18/2007  Imm Admin: Zoster Vaccine Live (ZVL) (Zostavax) - HISTORICAL DATA              Influenza Vaccine (Series Information) Completed      10/30/2023  Imm Admin: Influenza Vaccine Adult HD     09/20/2022  Imm Admin: Influenza Vaccine Adult HD    09/30/2021  Imm Admin: Influenza Vaccine Adult HD    09/05/2020  Imm Admin: Influenza (IM) Preservative Free - HISTORICAL DATA    09/28/2019  Imm Admin: Influenza Vaccine Adult HD    Only the first 5 history entries have been loaded, but more history exists.              COVID-19 Vaccine (Series Information) Completed      11/14/2023  Imm Admin: Comirnaty (Covid-19 Vaccine, Mrna, [id number] Formula)    11/03/2022  Imm Admin: PFIZER BIVALENT SARS-COV-2 VACCINE (12+)    04/15/2022  Imm Admin: PFIZER HARRELL CAP SARS-COV-2 VACCINATION (12+)    10/25/2021  Imm Admin: PFIZER PURPLE CAP SARS-COV-2 VACCINATION (12+)    02/05/2021  Imm Admin: PFIZER PURPLE CAP SARS-COV-2 VACCINATION (12+)    Only the first 5 history entries have been loaded, but more history exists.              Hepatitis B Vaccine (Hep B) (Series Information) Aged Out      No completion history exists for this topic.              HPV Vaccines (Series Information) Aged Out      No completion history exists for this topic.              Polio Vaccine (Inactivated Polio) (Series Information) Aged Out      No completion history exists for this topic.              Meningococcal Immunization (Series Information) Aged Out      No completion history exists for this topic.              Discontinued - Mammogram  Discontinued        Frequency changed to Never automatically (Topic No Longer Applies)    11/16/2023  [id number]    11/14/2022  [id number]    11/10/2021  [id number]    11/13/2020  [id number]    Only the first 5 history entries have been loaded, but more history exists.              Discontinued - Cervical Cancer Screening  Discontinued        Frequency changed to Never automatically (Topic No Longer Applies)    07/06/2010  PAP IG, RFX HPV ASCU              Discontinued - Colorectal Cancer Screening  Discontinued        Frequency changed to Never automatically  "(Topic No Longer Applies)    05/21/2013  Colonoscopy (Done - [facility])    05/21/2013  AMB REFERRAL TO GI FOR COLONOSCOPY    06/14/2012  OCCULT BLOOD FECES IMMUNOASSAY              Discontinued - Hepatitis C Screening  Discontinued        Frequency changed to Never automatically (Topic No Longer Applies)    10/01/2014  Hepatitis C Antibody component of HEPATITIS PANEL ACUTE(4 COMPONENTS)                    Patient Care Team:  Jayda Garcia M.D. as PCP - General (Family Medicine)  Soco Arzola R.N. as Registered Nurse        Social History     Tobacco Use    Smoking status: Never    Smokeless tobacco: Never   Vaping Use    Vaping Use: Never used   Substance Use Topics    Alcohol use: Yes     Comment: rare    Drug use: No     Family History   Problem Relation Age of Onset    Cancer Mother         uterine    Hypertension Mother     Heart Disease Mother         arrythmia    Cancer Father     Cancer Maternal Aunt         breast     She  has a past medical history of Allergy, Benign microscopic hematuria (2/9/2017), DDD (degenerative disc disease), lumbar (5/4/2015), Glaucoma, Hiatal hernia, IBS (irritable bowel syndrome), Osteopenia, PAC (premature atrial contraction), Rosacea, Seasonal allergies (5/18/2021), SVT (supraventricular tachycardia) (2/9/2015), and Ulcer (2004).   Past Surgical History:   Procedure Laterality Date    COLONOSCOPY  2003    recheck 10 years    ABDOMINAL HYSTERECTOMY TOTAL  1987    BUNIONECTOMY      x2       Exam:   /70   Pulse 69   Temp 36.3 °C (97.4 °F)   Resp 14   Ht 1.664 m (5' 5.51\")   Wt 65.3 kg (144 lb)   SpO2 98%  Body mass index is 23.59 kg/m².         Physical Exam  Constitutional:       General: She is not in acute distress.     Appearance: Normal appearance.   HENT:      Head: Normocephalic and atraumatic.      Nose: Nose normal.      Mouth/Throat:      Mouth: Mucous membranes are moist.   Eyes:      Conjunctiva/sclera: Conjunctivae normal.   Cardiovascular:      Rate " and Rhythm: Normal rate and regular rhythm.   Pulmonary:      Effort: Pulmonary effort is normal.      Breath sounds: Normal breath sounds. No wheezing.   Abdominal:      General: Abdomen is flat.      Palpations: Abdomen is soft.   Musculoskeletal:      Cervical back: No rigidity.      Right lower leg: No edema.      Left lower leg: No edema.   Lymphadenopathy:      Cervical: No cervical adenopathy.   Skin:     Coloration: Skin is not jaundiced.      Findings: No erythema.   Neurological:      General: No focal deficit present.      Mental Status: She is alert and oriented to person, place, and time. Mental status is at baseline.   Psychiatric:         Mood and Affect: Mood normal.         Behavior: Behavior normal.         Thought Content: Thought content normal.         Judgment: Judgment normal.       Office Visit on 2024   Component Date Value Ref Range Status    Report 2024    Final                    Value:[facility]    Test Date:  2024  Pt Name:    ZACHARY GLASS                 [facility]  MRN:        [id number]                      Room:  Gender:     Female                       Technician: TIARA  :        [id number]                   Requested By:DANTE HARPER  Order #:    [id number]                    Reading MD: Dante Harper MD    Measurements  Intervals                                Axis  Rate:       68                           P:          67  LA:         188                          QRS:        64  QRSD:       79                           T:          51  QT:         389  QTc:        414    Interpretive Statements  Sinus rhythm  Compared to ECG 2023 11:29:52  No significant changes  Electronically Signed On 2024 09:41:13 PST by Dante Harper MD     Hospital Outpatient Visit on 01/15/2024   Component Date Value Ref Range Status    Hep B Surface Antibody Quant 01/15/2024 <3.50  0.00 - 10.00 mIU/mL Final    Comment: Individual is considered to be not  immune to infection with HBV.  Reference Interval: anti-HBs  < 8.5 mIU/mL..........Negative.  8.5 - 11.4 mIU/mL..........Indeterminate.  = 11.5 mIU/mL..........Positive.  Assay performance characteristics have not been established when the Elecsys  Anti HBs assay is used in conjunction with other manufacturers' assays for  specific HBV serological markers.  For post-vaccination antibody testing guidelines for the general public,  refer to MMWR December 23, 2005/Vol. 54 (No.16);1-23, and for healthcare  workers, refer to MMWR December 20, 2013/Vol.62(No. 10);1-19.      WBC 01/15/2024 4.7 (L)  4.8 - 10.8 K/uL Final    RBC 01/15/2024 4.39  4.20 - 5.40 M/uL Final    Hemoglobin 01/15/2024 14.2  12.0 - 16.0 g/dL Final    Hematocrit 01/15/2024 42.2  37.0 - 47.0 % Final    MCV 01/15/2024 96.1  81.4 - 97.8 fL Final    MCH 01/15/2024 32.3  27.0 - 33.0 pg Final    MCHC 01/15/2024 33.6  32.2 - 35.5 g/dL Final    Please note new reference range effective 05/22/2023.    RDW 01/15/2024 45.5  35.9 - 50.0 fL Final    Platelet Count 01/15/2024 224  164 - 446 K/uL Final    MPV 01/15/2024 10.8  9.0 - 12.9 fL Final    Neutrophils-Polys 01/15/2024 53.50  44.00 - 72.00 % Final    Lymphocytes 01/15/2024 31.60  22.00 - 41.00 % Final    Monocytes 01/15/2024 9.60  0.00 - 13.40 % Final    Eosinophils 01/15/2024 3.80  0.00 - 6.90 % Final    Basophils 01/15/2024 1.30  0.00 - 1.80 % Final    Immature Granulocytes 01/15/2024 0.20  0.00 - 0.90 % Final    Nucleated RBC 01/15/2024 0.00  0.00 - 0.20 /100 WBC Final    Please note new reference range effective 05/22/2023.    Neutrophils (Absolute) 01/15/2024 2.52  1.82 - 7.42 K/uL Final    Comment: Includes immature neutrophils, if present.  Please note new reference range effective 05/22/2023.      Lymphs (Absolute) 01/15/2024 1.49  1.00 - 4.80 K/uL Final    Monos (Absolute) 01/15/2024 0.45  0.00 - 0.85 K/uL Final    Eos (Absolute) 01/15/2024 0.18  0.00 - 0.51 K/uL Final    Baso (Absolute) 01/15/2024  0.06  0.00 - 0.12 K/uL Final    Immature Granulocytes (abs) 01/15/2024 0.01  0.00 - 0.11 K/uL Final    NRBC (Absolute) 01/15/2024 0.00  K/uL Final    TSH 01/15/2024 2.860  0.380 - 5.330 uIU/mL Final    Comment: The 2011 American Thyroid Association (JOY) guidelines  recommended that the interpretation of thyroid function in  pregnancy be based on trimester specific reference ranges.    1st Trimester  0.100-2.500 mIU/L  2nd Trimester  0.200-3.000 mIU/L  3rd Trimester  0.300-3.500 mIU/L    These established reference ranges have not been validated  at [facility].      Cholesterol,Tot 01/15/2024 186  100 - 199 mg/dL Final    Triglycerides 01/15/2024 56  0 - 149 mg/dL Final    HDL 01/15/2024 74  >=40 mg/dL Final    LDL 01/15/2024 101 (H)  <100 mg/dL Final    Sodium 01/15/2024 144  135 - 145 mmol/L Final    Potassium 01/15/2024 4.0  3.6 - 5.5 mmol/L Final    Chloride 01/15/2024 109  96 - 112 mmol/L Final    Co2 01/15/2024 24  20 - 33 mmol/L Final    Anion Gap 01/15/2024 11.0  7.0 - 16.0 Final    Glucose 01/15/2024 94  65 - 99 mg/dL Final    Bun 01/15/2024 17  8 - 22 mg/dL Final    Creatinine 01/15/2024 0.97  0.50 - 1.40 mg/dL Final    Calcium 01/15/2024 9.3  8.5 - 10.5 mg/dL Final    Correct Calcium 01/15/2024 9.1  8.5 - 10.5 mg/dL Final    AST(SGOT) 01/15/2024 22  12 - 45 U/L Final    ALT(SGPT) 01/15/2024 19  2 - 50 U/L Final    Alkaline Phosphatase 01/15/2024 64  30 - 99 U/L Final    Total Bilirubin 01/15/2024 1.4  0.1 - 1.5 mg/dL Final    Albumin 01/15/2024 4.2  3.2 - 4.9 g/dL Final    Total Protein 01/15/2024 6.9  6.0 - 8.2 g/dL Final    Globulin 01/15/2024 2.7  1.9 - 3.5 g/dL Final    A-G Ratio 01/15/2024 1.6  g/dL Final    Vitamin B12 -True Cobalamin 01/15/2024 471  211 - 911 pg/mL Final    Glycohemoglobin 01/15/2024 5.3  4.0 - 5.6 % Final    Comment: Increased risk for diabetes:  5.7 -6.4%  Diabetes:  >6.4%  Glycemic control for adults with diabetes:  <7.0%    The above interpretations are per ADA  guidelines.  Diagnosis  of diabetes mellitus on the basis of elevated Hemoglobin A1c  should be confirmed by repeating the Hb A1c test.      Est Avg Glucose 01/15/2024 105  mg/dL Final    Comment: The eAG calculation is based on the A1c-Derived Daily Glucose  (ADAG) study.  See the ADA's website for additional information.      25-Hydroxy   Vitamin D 25 01/15/2024 50  30 - 100 ng/mL Final    Comment: Adult Ranges:   <20 ng/mL - Deficiency  20-29 ng/mL - Insufficiency   ng/mL - Sufficiency  Electrochemiluminescence binding assay performed using Roche elvia e  immunoassay analyzer.  The Elecsys Vitamin D total II assay is intended for  the quantitative determination of total 25 hydroxyvitamin D in human serum  and plasma. This assay is to be used as an aid in the assessment of vitamin  D sufficiency in adults.      GFR (CKD-EPI) 01/15/2024 59 (A)  >60 mL/min/1.73 m 2 Final    Comment: Estimated Glomerular Filtration Rate is calculated using  race neutral CKD-EPI 2021 equation per NKF-ASN recommendations.     Office Visit on 12/09/2023   Component Date Value Ref Range Status    POC Group A Strep, PCR 12/09/2023 Not Detected  Not Detected, Invalid Final   Hospital Outpatient Visit on 12/07/2023   Component Date Value Ref Range Status    Sodium 12/07/2023 141  135 - 145 mmol/L Final    Potassium 12/07/2023 3.7  3.6 - 5.5 mmol/L Final    Chloride 12/07/2023 107  96 - 112 mmol/L Final    Co2 12/07/2023 23  20 - 33 mmol/L Final    Anion Gap 12/07/2023 11.0  7.0 - 16.0 Final    Glucose 12/07/2023 84  65 - 99 mg/dL Final    Bun 12/07/2023 15  8 - 22 mg/dL Final    Creatinine 12/07/2023 0.93  0.50 - 1.40 mg/dL Final    Calcium 12/07/2023 9.7  8.5 - 10.5 mg/dL Final    Correct Calcium 12/07/2023 9.7  8.5 - 10.5 mg/dL Final    AST(SGOT) 12/07/2023 21  12 - 45 U/L Final    ALT(SGPT) 12/07/2023 19  2 - 50 U/L Final    Alkaline Phosphatase 12/07/2023 57  30 - 99 U/L Final    Total Bilirubin 12/07/2023 1.8 (H)  0.1 - 1.5 mg/dL  Final    Comment: The icterus index of the specimen exceeds the allowed tolerance for the  test.  Result may be affected.      Albumin 12/07/2023 4.0  3.2 - 4.9 g/dL Final    Total Protein 12/07/2023 7.1  6.0 - 8.2 g/dL Final    Globulin 12/07/2023 3.1  1.9 - 3.5 g/dL Final    A-G Ratio 12/07/2023 1.3  g/dL Final    Stat C-Reactive Protein 12/07/2023 <0.30  0.00 - 0.75 mg/dL Final    WBC 12/07/2023 6.6  4.8 - 10.8 K/uL Final    RBC 12/07/2023 4.43  4.20 - 5.40 M/uL Final    Hemoglobin 12/07/2023 14.3  12.0 - 16.0 g/dL Final    Hematocrit 12/07/2023 41.1  37.0 - 47.0 % Final    MCV 12/07/2023 92.8  81.4 - 97.8 fL Final    MCH 12/07/2023 32.3  27.0 - 33.0 pg Final    MCHC 12/07/2023 34.8  32.2 - 35.5 g/dL Final    Please note new reference range effective 05/22/2023.    RDW 12/07/2023 42.5  35.9 - 50.0 fL Final    Platelet Count 12/07/2023 210  164 - 446 K/uL Final    MPV 12/07/2023 10.7  9.0 - 12.9 fL Final    Neutrophils-Polys 12/07/2023 60.20  44.00 - 72.00 % Final    Lymphocytes 12/07/2023 27.80  22.00 - 41.00 % Final    Monocytes 12/07/2023 8.50  0.00 - 13.40 % Final    Eosinophils 12/07/2023 2.20  0.00 - 6.90 % Final    Basophils 12/07/2023 1.10  0.00 - 1.80 % Final    Immature Granulocytes 12/07/2023 0.20  0.00 - 0.90 % Final    Nucleated RBC 12/07/2023 0.00  0.00 - 0.20 /100 WBC Final    Please note new reference range effective 05/22/2023.    Neutrophils (Absolute) 12/07/2023 3.90  1.82 - 7.42 K/uL Final    Comment: Includes immature neutrophils, if present.  Please note new reference range effective 05/22/2023.      Lymphs (Absolute) 12/07/2023 1.80  1.00 - 4.80 K/uL Final    Monos (Absolute) 12/07/2023 0.55  0.00 - 0.85 K/uL Final    Eos (Absolute) 12/07/2023 0.14  0.00 - 0.51 K/uL Final    Baso (Absolute) 12/07/2023 0.07  0.00 - 0.12 K/uL Final    Immature Granulocytes (abs) 12/07/2023 0.01  0.00 - 0.11 K/uL Final    NRBC (Absolute) 12/07/2023 0.00  K/uL Final    TSH 12/07/2023 2.470  0.380 - 5.330 uIU/mL  Final    Comment: The 2011 American Thyroid Association (JOY) guidelines  recommended that the interpretation of thyroid function in  pregnancy be based on trimester specific reference ranges.    1st Trimester  0.100-2.500 mIU/L  2nd Trimester  0.200-3.000 mIU/L  3rd Trimester  0.300-3.500 mIU/L    These established reference ranges have not been validated  at [facility].      Free T-4 12/07/2023 1.06  0.93 - 1.70 ng/dL Final    T3,Free 12/07/2023 2.54  2.00 - 4.40 pg/mL Final    GFR (CKD-EPI) 12/07/2023 62  >60 mL/min/1.73 m 2 Final    Comment: Estimated Glomerular Filtration Rate is calculated using  race neutral CKD-EPI 2021 equation per NKF-ASN recommendations.     ]       Assessment and Plan. The following treatment and monitoring plan is recommended:     1. Medicare annual wellness visit, subsequent    2. Primary hypertension--stable continue current regimen      3. Irritable bowel syndrome, unspecified type  - hyoscyamine (LEVSIN) 0.125 MG tablet; Take 1 Tablet by mouth every 6 hours as needed for Cramping.  Dispense: 30 Tablet; Refill: 1    4. Plantar fasciitis  - Referral to Physical Therapy    5. Chronic neck and back pain  - Referral to Physical Therapy    Other orders  - cycloSPORINE (RESTASIS) 0.05 % ophthalmic emulsion; INSTILL 1 DROP INTO BOTH EYES EVERY 12 HOURS PA DENIED  - Mirabegron ER (MYRBETRIQ) 50 MG TABLET SR 24 HR; Take 1 Tablet by mouth every day.  Dispense: 90 Tablet; Refill: 3      Assessment & Plan           HCC Gap Form    Last edited 03/04/24 16:54 PST by Jayda Garcia M.D.              Services suggested: No services needed at this time  Health Care Screening: Age-appropriate preventive services recommended by USPTF and ACIP covered by Medicare were discussed today. Services ordered if indicated and agreed upon by the patient.  Referrals offered: Community-based lifestyle interventions to reduce health risks and promote self-management and wellness, fall prevention,  Met - goal discontinued nutrition, physical activity, tobacco-use cessation, weight loss, and mental health services as per orders if indicated.    Discussion today about general wellness and lifestyle habits:    Prevent falls and reduce trip hazards; Cautioned about securing or removing rugs.  Have a working fire alarm and carbon monoxide detector;   Engage in regular physical activity and social activities     Follow-up:  Labs every 6 months and routine annual wellness exam     Met - goal discontinued No Change

## 2025-04-02 ENCOUNTER — OFFICE VISIT (OUTPATIENT)
Dept: CARDIOLOGY | Facility: MEDICAL CENTER | Age: 80
End: 2025-04-02
Attending: INTERNAL MEDICINE
Payer: MEDICARE

## 2025-04-02 VITALS
SYSTOLIC BLOOD PRESSURE: 122 MMHG | OXYGEN SATURATION: 96 % | HEIGHT: 66 IN | BODY MASS INDEX: 22.5 KG/M2 | WEIGHT: 140 LBS | DIASTOLIC BLOOD PRESSURE: 70 MMHG | HEART RATE: 68 BPM | RESPIRATION RATE: 16 BRPM

## 2025-04-02 DIAGNOSIS — I47.10 SVT (SUPRAVENTRICULAR TACHYCARDIA) (HCC): Primary | ICD-10-CM

## 2025-04-02 LAB — EKG IMPRESSION: NORMAL

## 2025-04-02 PROCEDURE — 99213 OFFICE O/P EST LOW 20 MIN: CPT | Performed by: INTERNAL MEDICINE

## 2025-04-02 PROCEDURE — 93005 ELECTROCARDIOGRAM TRACING: CPT | Mod: TC | Performed by: INTERNAL MEDICINE

## 2025-04-02 ASSESSMENT — FIBROSIS 4 INDEX: FIB4 SCORE: 1.83

## 2025-04-24 RX ORDER — MUPIROCIN 20 MG/G
1 OINTMENT TOPICAL 2 TIMES DAILY
Qty: 22 G | Refills: 0 | Status: SHIPPED | OUTPATIENT
Start: 2025-04-24

## 2025-06-09 ENCOUNTER — PATIENT MESSAGE (OUTPATIENT)
Dept: INTERNAL MEDICINE | Facility: IMAGING CENTER | Age: 80
End: 2025-06-09
Payer: MEDICARE

## 2025-06-09 DIAGNOSIS — K21.9 GASTROESOPHAGEAL REFLUX DISEASE WITHOUT ESOPHAGITIS: ICD-10-CM

## 2025-06-10 RX ORDER — OMEPRAZOLE 40 MG/1
40 CAPSULE, DELAYED RELEASE ORAL
Qty: 100 CAPSULE | Refills: 3 | Status: SHIPPED | OUTPATIENT
Start: 2025-06-10

## 2025-07-10 LAB — EKG IMPRESSION: NORMAL

## 2025-07-29 RX ORDER — IRBESARTAN 75 MG/1
75 TABLET ORAL DAILY
Qty: 90 TABLET | Refills: 3 | Status: SHIPPED | OUTPATIENT
Start: 2025-07-29

## 2025-08-04 RX ORDER — METOPROLOL TARTRATE 25 MG/1
25 TABLET, FILM COATED ORAL 2 TIMES DAILY
Qty: 180 TABLET | Refills: 3 | Status: SHIPPED | OUTPATIENT
Start: 2025-08-04

## 2025-08-06 ENCOUNTER — OFFICE VISIT (OUTPATIENT)
Dept: INTERNAL MEDICINE | Facility: IMAGING CENTER | Age: 80
End: 2025-08-06
Payer: MEDICARE

## 2025-08-06 VITALS
TEMPERATURE: 97.6 F | SYSTOLIC BLOOD PRESSURE: 120 MMHG | BODY MASS INDEX: 21.21 KG/M2 | OXYGEN SATURATION: 97 % | DIASTOLIC BLOOD PRESSURE: 74 MMHG | HEART RATE: 69 BPM | WEIGHT: 132 LBS | RESPIRATION RATE: 14 BRPM | HEIGHT: 66 IN

## 2025-08-06 DIAGNOSIS — K21.9 GASTROESOPHAGEAL REFLUX DISEASE WITHOUT ESOPHAGITIS: ICD-10-CM

## 2025-08-06 DIAGNOSIS — I10 PRIMARY HYPERTENSION: ICD-10-CM

## 2025-08-06 DIAGNOSIS — F43.9 STRESS: ICD-10-CM

## 2025-08-06 DIAGNOSIS — K58.9 IRRITABLE BOWEL SYNDROME, UNSPECIFIED TYPE: Primary | ICD-10-CM

## 2025-08-06 PROBLEM — K29.70 GASTRITIS, UNSPECIFIED, WITHOUT BLEEDING: Status: ACTIVE | Noted: 2025-08-06

## 2025-08-06 PROBLEM — K27.9 PEPTIC ULCER WITHOUT HEMORRHAGE, PERFORATION, OR OBSTRUCTION: Status: ACTIVE | Noted: 2025-08-06

## 2025-08-06 PROBLEM — R13.19 OTHER DYSPHAGIA: Status: ACTIVE | Noted: 2025-08-06

## 2025-08-06 PROBLEM — M54.9 BACKACHE: Status: ACTIVE | Noted: 2025-08-06

## 2025-08-06 PROCEDURE — 3074F SYST BP LT 130 MM HG: CPT | Performed by: FAMILY MEDICINE

## 2025-08-06 PROCEDURE — 99215 OFFICE O/P EST HI 40 MIN: CPT | Performed by: FAMILY MEDICINE

## 2025-08-06 PROCEDURE — 3078F DIAST BP <80 MM HG: CPT | Performed by: FAMILY MEDICINE

## 2025-08-06 ASSESSMENT — FIBROSIS 4 INDEX: FIB4 SCORE: 1.85

## 2025-08-18 ENCOUNTER — NON-PROVIDER VISIT (OUTPATIENT)
Dept: INTERNAL MEDICINE | Facility: IMAGING CENTER | Age: 80
End: 2025-08-18
Payer: MEDICARE

## 2025-08-18 ENCOUNTER — HOSPITAL ENCOUNTER (OUTPATIENT)
Facility: MEDICAL CENTER | Age: 80
End: 2025-08-18
Attending: FAMILY MEDICINE
Payer: MEDICARE

## 2025-08-18 DIAGNOSIS — Z79.899 MEDICATION MANAGEMENT: ICD-10-CM

## 2025-08-18 DIAGNOSIS — Z01.89 ENCOUNTER FOR ROUTINE LABORATORY TESTING: Primary | ICD-10-CM

## 2025-08-18 LAB
25(OH)D3 SERPL-MCNC: 49 NG/ML (ref 30–100)
ALBUMIN SERPL BCP-MCNC: 4.2 G/DL (ref 3.2–4.9)
ALBUMIN/GLOB SERPL: 1.5 G/DL
ALP SERPL-CCNC: 66 U/L (ref 30–99)
ALT SERPL-CCNC: 25 U/L (ref 2–50)
ANION GAP SERPL CALC-SCNC: 11 MMOL/L (ref 7–16)
AST SERPL-CCNC: 27 U/L (ref 12–45)
BASOPHILS # BLD AUTO: 2.6 % (ref 0–1.8)
BASOPHILS # BLD: 0.12 K/UL (ref 0–0.12)
BILIRUB SERPL-MCNC: 1.3 MG/DL (ref 0.1–1.5)
BUN SERPL-MCNC: 15 MG/DL (ref 8–22)
CALCIUM ALBUM COR SERPL-MCNC: 9.2 MG/DL (ref 8.5–10.5)
CALCIUM SERPL-MCNC: 9.4 MG/DL (ref 8.5–10.5)
CHLORIDE SERPL-SCNC: 108 MMOL/L (ref 96–112)
CHOLEST SERPL-MCNC: 191 MG/DL (ref 100–199)
CO2 SERPL-SCNC: 23 MMOL/L (ref 20–33)
CREAT SERPL-MCNC: 0.96 MG/DL (ref 0.5–1.4)
EOSINOPHIL # BLD AUTO: 0.35 K/UL (ref 0–0.51)
EOSINOPHIL NFR BLD: 7.8 % (ref 0–6.9)
ERYTHROCYTE [DISTWIDTH] IN BLOOD BY AUTOMATED COUNT: 42.7 FL (ref 35.9–50)
EST. AVERAGE GLUCOSE BLD GHB EST-MCNC: 111 MG/DL
GFR SERPLBLD CREATININE-BSD FMLA CKD-EPI: 59 ML/MIN/1.73 M 2
GLOBULIN SER CALC-MCNC: 2.8 G/DL (ref 1.9–3.5)
GLUCOSE SERPL-MCNC: 89 MG/DL (ref 65–99)
HBA1C MFR BLD: 5.5 % (ref 4–5.6)
HCT VFR BLD AUTO: 46.6 % (ref 37–47)
HDLC SERPL-MCNC: 78 MG/DL
HGB BLD-MCNC: 15.9 G/DL (ref 12–16)
LDLC SERPL CALC-MCNC: 100 MG/DL
LYMPHOCYTES # BLD AUTO: 1.08 K/UL (ref 1–4.8)
LYMPHOCYTES NFR BLD: 24.1 % (ref 22–41)
MANUAL DIFF BLD: NORMAL
MCH RBC QN AUTO: 32.2 PG (ref 27–33)
MCHC RBC AUTO-ENTMCNC: 34.1 G/DL (ref 32.2–35.5)
MCV RBC AUTO: 94.3 FL (ref 81.4–97.8)
MONOCYTES # BLD AUTO: 0.5 K/UL (ref 0–0.85)
MONOCYTES NFR BLD AUTO: 10.3 % (ref 0–13.4)
MORPHOLOGY BLD-IMP: NORMAL
NEUTROPHILS # BLD AUTO: 2.48 K/UL (ref 1.82–7.42)
NEUTROPHILS NFR BLD: 55.2 % (ref 44–72)
NRBC # BLD AUTO: 0 K/UL
NRBC BLD-RTO: 0 /100 WBC (ref 0–0.2)
PLATELET # BLD AUTO: 233 K/UL (ref 164–446)
PLATELET BLD QL SMEAR: NORMAL
PMV BLD AUTO: 10.8 FL (ref 9–12.9)
POTASSIUM SERPL-SCNC: 3.5 MMOL/L (ref 3.6–5.5)
PROT SERPL-MCNC: 7 G/DL (ref 6–8.2)
RBC # BLD AUTO: 4.94 M/UL (ref 4.2–5.4)
RBC BLD AUTO: NORMAL
SODIUM SERPL-SCNC: 142 MMOL/L (ref 135–145)
TRIGL SERPL-MCNC: 65 MG/DL (ref 0–149)
TSH SERPL-ACNC: 3.18 UIU/ML (ref 0.38–5.33)
VIT B12 SERPL-MCNC: 313 PG/ML (ref 211–911)
WBC # BLD AUTO: 4.5 K/UL (ref 4.8–10.8)

## 2025-08-18 PROCEDURE — 80053 COMPREHEN METABOLIC PANEL: CPT

## 2025-08-18 PROCEDURE — 85027 COMPLETE CBC AUTOMATED: CPT

## 2025-08-18 PROCEDURE — 84443 ASSAY THYROID STIM HORMONE: CPT

## 2025-08-18 PROCEDURE — 80061 LIPID PANEL: CPT

## 2025-08-18 PROCEDURE — 82306 VITAMIN D 25 HYDROXY: CPT

## 2025-08-18 PROCEDURE — 83036 HEMOGLOBIN GLYCOSYLATED A1C: CPT

## 2025-08-18 PROCEDURE — 82607 VITAMIN B-12: CPT

## 2025-08-18 PROCEDURE — 85007 BL SMEAR W/DIFF WBC COUNT: CPT
